# Patient Record
Sex: FEMALE | Race: BLACK OR AFRICAN AMERICAN | Employment: UNEMPLOYED | URBAN - METROPOLITAN AREA
[De-identification: names, ages, dates, MRNs, and addresses within clinical notes are randomized per-mention and may not be internally consistent; named-entity substitution may affect disease eponyms.]

---

## 2019-09-16 ENCOUNTER — OP HISTORICAL/CONVERTED ENCOUNTER (OUTPATIENT)
Dept: OTHER | Age: 39
End: 2019-09-16

## 2019-10-01 ENCOUNTER — OP HISTORICAL/CONVERTED ENCOUNTER (OUTPATIENT)
Dept: OTHER | Age: 39
End: 2019-10-01

## 2020-03-04 ENCOUNTER — OP HISTORICAL/CONVERTED ENCOUNTER (OUTPATIENT)
Dept: OTHER | Age: 40
End: 2020-03-04

## 2020-03-11 ENCOUNTER — OP HISTORICAL/CONVERTED ENCOUNTER (OUTPATIENT)
Dept: OTHER | Age: 40
End: 2020-03-11

## 2021-02-08 RX ORDER — HYDROCHLOROTHIAZIDE 25 MG/1
25 TABLET ORAL DAILY
COMMUNITY
End: 2022-02-10 | Stop reason: ALTCHOICE

## 2021-02-08 RX ORDER — MEDROXYPROGESTERONE ACETATE 10 MG/1
TABLET ORAL DAILY
COMMUNITY
End: 2022-02-10 | Stop reason: ALTCHOICE

## 2021-02-08 RX ORDER — FUROSEMIDE 40 MG/1
TABLET ORAL DAILY
COMMUNITY

## 2021-02-08 RX ORDER — ASPIRIN 325 MG
TABLET, DELAYED RELEASE (ENTERIC COATED) ORAL
COMMUNITY
End: 2022-02-10 | Stop reason: ALTCHOICE

## 2021-02-08 RX ORDER — OXYCODONE AND ACETAMINOPHEN 5; 325 MG/1; MG/1
TABLET ORAL
COMMUNITY
End: 2022-02-10 | Stop reason: ALTCHOICE

## 2021-02-08 RX ORDER — IBUPROFEN 800 MG/1
TABLET ORAL
COMMUNITY
End: 2022-02-10 | Stop reason: ALTCHOICE

## 2021-02-08 RX ORDER — DOXYCYCLINE 100 MG/1
100 CAPSULE ORAL 2 TIMES DAILY
COMMUNITY
End: 2022-02-10 | Stop reason: ALTCHOICE

## 2021-02-08 RX ORDER — AMLODIPINE BESYLATE 2.5 MG/1
TABLET ORAL DAILY
COMMUNITY
End: 2022-02-10 | Stop reason: ALTCHOICE

## 2021-02-08 RX ORDER — ALBUTEROL SULFATE 90 UG/1
AEROSOL, METERED RESPIRATORY (INHALATION)
COMMUNITY

## 2021-02-08 RX ORDER — PHENTERMINE HYDROCHLORIDE 37.5 MG/1
37.5 TABLET ORAL
COMMUNITY
End: 2022-02-10 | Stop reason: ALTCHOICE

## 2021-02-08 RX ORDER — FLUCONAZOLE 150 MG/1
150 TABLET ORAL DAILY
COMMUNITY
End: 2022-02-10 | Stop reason: ALTCHOICE

## 2022-02-01 ENCOUNTER — TELEPHONE (OUTPATIENT)
Dept: SURGERY | Age: 42
End: 2022-02-01

## 2022-02-01 NOTE — TELEPHONE ENCOUNTER
I spoke with patient regarding bariatric benefits. She is covered, ref # [id number]. I scheduled a consult with Dr. Fahad Hameed.  monico

## 2022-02-10 ENCOUNTER — OFFICE VISIT (OUTPATIENT)
Dept: SURGERY | Age: 42
End: 2022-02-10
Payer: COMMERCIAL

## 2022-02-10 VITALS
WEIGHT: 293 LBS | HEIGHT: 65 IN | RESPIRATION RATE: 20 BRPM | HEART RATE: 88 BPM | OXYGEN SATURATION: 98 % | TEMPERATURE: 97.6 F | BODY MASS INDEX: 48.82 KG/M2 | SYSTOLIC BLOOD PRESSURE: 143 MMHG | DIASTOLIC BLOOD PRESSURE: 96 MMHG

## 2022-02-10 DIAGNOSIS — E66.01 MORBID OBESITY WITH BODY MASS INDEX (BMI) OF 60.0 TO 69.9 IN ADULT (HCC): Primary | ICD-10-CM

## 2022-02-10 DIAGNOSIS — J45.20 MILD INTERMITTENT ASTHMA, UNSPECIFIED WHETHER COMPLICATED: ICD-10-CM

## 2022-02-10 DIAGNOSIS — E11.9 TYPE 2 DIABETES MELLITUS WITHOUT COMPLICATION, WITHOUT LONG-TERM CURRENT USE OF INSULIN (HCC): ICD-10-CM

## 2022-02-10 PROCEDURE — 99204 OFFICE O/P NEW MOD 45 MIN: CPT | Performed by: SURGERY

## 2022-02-10 RX ORDER — METFORMIN HYDROCHLORIDE 500 MG/1
500 TABLET ORAL 2 TIMES DAILY WITH MEALS
COMMUNITY

## 2022-02-10 RX ORDER — LIRAGLUTIDE 6 MG/ML
INJECTION SUBCUTANEOUS
COMMUNITY
Start: 2021-07-21

## 2022-02-10 NOTE — PROGRESS NOTES
Bariatric Surgery Consultation    CC: morbid obesity    Subjective: The patient is a 35 y.o. obese  female with a Body mass index is 63.24 kg/m². They have been considering surgery for some time now. The patient presents to the clinic today to discuss surgical weight loss options. They have made multiple attempts at weight loss over the years without success, including healthy diet, counting carbs, cutting out sodas. Unfortunately, none of these have lead to meaningful, sustained weight loss. The patient now suffers from multiple medical conditions related to their obesity including diabetes. Relevant previous surgical history includes:  section. They have attended the bariatric seminar prior to this office visit. The patient's goals for the surgery include wants to lose 100 lbs. The patient was in the surgical weight loss program [place] but she was diagnosed with uncontrolled diabetes with high HgbA1c prior to surgery so her procedure was delayed. She has not followed up with that program since that time. Tobacco use: no  GERD/hiatal hernia: patient denies, previous EGD at [facility] showed esophagitis, gastritis, and hiatal hernia    Sleep Apnea Assessment:     STOPBANG questionnaire  Do you Snore loudly? no  Do you often feel Tired, fatigued, or sleepy during the daytime? no  Has anyone Observed you stop breathing during your sleep? no  Are you being treated for high blood pressure? no  BMI more than 35 kg/m2? yes  Age over 48years old? no  Neck Circumference >16 inches? no  Gender male? no  ______________________________________     SCORE: 1     If YES to 0 - 2, low risk of sleep apnea  If YES to 3 - 4 intermediate risk of having sleep apnea  If YES to 5 - 8 high risk of having sleep apnea (or 2 + BMI 35 or Neck > 17\" or Male)     The patient was previously tested for sleep apnea during surgical weight loss evaluation and did not require CPAP.     Comorbidities: Bariatric comorbidities present: non-insulin dependent diabetes    Ambulatory status: independent    The patient's reported level of exercise: not active. Past Medical History:   Diagnosis Date    Asthma     Chronic pain     back pains/ disc compression    Diabetes ([address]     Hypertension      Past Surgical History:   Procedure Laterality Date    HX  SECTION  2011      Social History     Tobacco Use    Smoking status: Never Smoker    Smokeless tobacco: Never Used   Substance Use Topics    Alcohol use: Yes     Comment: socially      Family History   Problem Relation Age of Onset    Diabetes Mother     Heart Disease Mother     Hypertension Mother     Stroke Mother       Prior to Admission medications    Medication Sig Start Date End Date Taking? Authorizing Provider   liraglutide (Victoza 2-Jonn) 0.6 mg/0.1 mL (18 mg/3 mL) pnij See Instructions, start with 0.6 mg daily and increase gradually to1.8 mg in 2 weeks SQ daily, # 15 mL, 2 Refills, Pharmacy: [facility]/pharmacy #[id number]  Yes Provider, Historical   metFORMIN (GLUCOPHAGE) 500 mg tablet Take 500 mg by mouth two (2) times daily (with meals). Yes Provider, Historical   albuterol (PROVENTIL HFA, VENTOLIN HFA, PROAIR HFA) 90 mcg/actuation inhaler Take  by inhalation. Yes Provider, Historical   furosemide (LASIX) 40 mg tablet Take  by mouth daily.    Yes Provider, Historical     No Known Allergies     Review of Systems:    Constitutional: No fever or chills  Neurologic: No headache  Eyes: No scleral icterus or irritated eyes  Nose: No nasal pain or drainage  Mouth: No oral lesions or sore throat  Cardiac: No palpations or chest pain  Pulmonary: No cough or shortness of breath  Gastrointestinal: No nausea, emesis, diarrhea, or constipation  Genitourinary: No dysuria  Musculoskeletal: +back pain  Skin: No rashes or lesions  Psychiatric: No anxiety or depressed mood      Objective:     Physical Exam:    Visit Vitals  BP (!) 143/96 (BP 1 Location: Right upper arm, BP Patient Position: Sitting, BP Cuff Size: Large adult)   Pulse 88   Temp 97.6 °F (36.4 °C) (Temporal)   Resp 20   Ht 5' 5\" (1.651 m)   Wt (!) 380 lb (172.4 kg)   SpO2 98%   BMI 63.24 kg/m²       General: No acute distress, conversant  Eyes: PERRLA, no scleral icterus  HENT: Normocephalic without oral lesions  Neck: Trachea midline without LAD  Cardiac: Normal pulse rate and rhythm  Pulmonary: Symmetric chest rise with normal effort  GI: Soft, NT, ND, no hernia, no splenomegaly  Skin: Warm without rash  Extremities: No edema or joint stiffness  Psych: Appropriate mood and affect    Assessment:     Morbid obesity with associated comorbidities    Plan:   The patient presents today with multiple complications relating to their obesity as detailed above. The patient has made multiple unsuccessful attempts at weight loss as detailed above. The patient desires surgical weight loss for a better chance of lifelong weight control and control of co-morbidities. They have attended the bariatric seminar and meet the qualifications for surgery based on the NIH criteria. We have discussed the various surgical options including the sleeve gastrectomy and gastric bypass. We also discussed non operative alternatives. The patient is currently interested in the laparoscopic gastric bypass. I believe they are a good candidate for this procedure. They will need a/an upper endoscopy to evaluate their anatomy pre operatively. She previously had EGD at [facility], but had reflux and gastritis changes at that time so repeating EGD would be beneficial prior to surgery. The patient will be required to not gain weight during this period if starting BMI is 35-40, lose 5% of starting weight if BMI is >40, or lose 10% of starting weight if BMI >60 during the supervised weight loss / pre operative process before our next visit for pre-operative consents. The goal for this patient is to lose 38 lbs.     We recommend that the patient undergo the following evaluations prior to considering surgery:    Dietician: Yes  Psychiatry/Psychology: Yes  Sleep Medicine: no  Cardiology: no  Gastroenterology: no  Pulmonology: no  Endocrinology: Yes (Sees Dr. Conrad Morillo at [facility], has a history of uncontrolled DM with HgbA1c >10, does not take her metformin currently)    We have discussed the possible complications of bariatric surgery which include but are not limited to failed weight loss, weight regain, malnutrition, leak, bleed, stricture, gastric ulcer, gastric fistula, gastric bleed, gallstones, new or worsening gastric reflux, nausea, emesis, internal hernia, abdominal wall hernia, gastric perforation, need for revision / conversion / or reversal, pregnancy complications and loss, intestinal ischemia, post operative skin complications, possible thinning of their hair, bowel obstruction, dumping syndrome, wound infection, blood clots (DVT, mesenteric thrombus, pulmonary embolism), increased addictive tendency, risk of anesthesia, and death. The patient understands this is a life altering decision and will require compliance to the program for the remainder of their life in order to be monitored to avoid complication and ensure successful, sustained weight control. They will be placed on a lifelong low carbohydrate and low sugar diet, exercise, and vitamin regimen and will require frequent blood draws and office visits to ensure adequate nutrition and program compliance. Visits and follow up will be in compliance with the guidelines set forth by [facility]. I have specifically mentioned the need to avoid all personal and second-hand tobacco exposure, systemic steroids, and NSAIDS after any bariatric surgery to help avoid the above listed complications.  The patient has expressed understanding of the above and would like to enroll in the program. The patient will be submitted for medical and psychological clearance along with establishing with our dietician and joining the pre / post operative support group. They will be screened for depression and sleep apnea and treated pre operatively if needed. The patient will have to demonstrate cessation of tobacco if relevant for at least 3 months preoperatively along with having a controlled HbA1c less than 8. After successful completion of the preoperative regimen the patient will be submitted for insurance approval and pending this will be scheduled for surgery. All questions from the patient have been answered and they have demonstrated appropriate understanding of the process. Problem List Items Addressed This Visit        Endocrine    Diabetes [address] [address]    Relevant Medications    liraglutide (Victoza 2-Jonn) 0.6 mg/0.1 mL (18 mg/3 mL) pnij    metFORMIN (GLUCOPHAGE) 500 mg tablet       Respiratory    Asthma      Other Visit Diagnoses     Morbid obesity with body mass index (BMI) of 60.0 to 69.9 in adult [facility])    -  Primary    Relevant Medications    liraglutide (Victoza 2-Jonn) 0.6 mg/0.1 mL (18 mg/3 mL) pnij    metFORMIN (GLUCOPHAGE) 500 mg tablet            Total time involved with this patient's care was: 45 minutes. This involved reviewing patient record, talking with patient, and charting on patient.     Signed By: Shaquille Martinez DO  Bariatric and General Surgeon  [address] [place] [address] Surgery    February 10, 2022

## 2022-02-10 NOTE — PROGRESS NOTES
Visit Vitals  BP (!) 143/96 (BP 1 Location: Right upper arm, BP Patient Position: Sitting, BP Cuff Size: Large adult)   Pulse 88   Temp 97.6 °F (36.4 °C) (Temporal)   Resp 20   Ht 5' 5\" (1.651 m)   Wt (!) 380 lb (172.4 kg)   SpO2 98%   BMI 63.24 kg/m²     Chief Complaint   Patient presents with    New Patient     Evaluate for bariatric surgery   1. Have you been to the ER, urgent care clinic since your last visit? Hospitalized since your last visit? No    2. Have you seen or consulted any other health care providers outside of the [address] [place] [address] since your last visit? Include any pap smears or colon screening. No blood pressure elevated-recheck after Dr. Augustine Hebert pt. Manual blood pressure check left lower arm 130/80.

## 2022-02-10 NOTE — Clinical Note
2/11/2022    Patient: Hieu Amaral   YOB: 1980   Date of Visit: 2/10/2022     Darreld Gosselin, MD  [address]  [address] [facility] [place] [address]  Via Fax: [phone]    Dear Darreld Gosselin, MD,      Thank you for referring Ms. Hieu Amaral to [address] [place] [address] for evaluation. My notes for this consultation are attached. If you have questions, please do not hesitate to call me. I look forward to following your patient along with you.       Sincerely,    Marcy Jensen Michelle, DO

## 2022-02-11 NOTE — PATIENT INSTRUCTIONS
Learning About Weight-Loss (Bariatric) Surgery  What is weight-loss surgery? Bariatric surgery is surgery to help you lose weight. This type of surgery is only used for people who are very overweight and have not been able to lose weight with diet and exercise. This surgery makes the stomach smaller. Some types of surgery also change the connection between your stomach and intestines. Having weight-loss surgery is a big step. After surgery, you'll need to make new, lifelong changes in how you eat and drink. How is weight-loss surgery done? Bariatric surgery may be either \"open\" or \"laparoscopic. \" Open surgery is done through a large cut (incision) in the belly. Laparoscopic surgery is done through several small cuts. The doctor puts a lighted tube, or scope, and other surgical tools through small cuts in your belly. The doctor is able to see your organs with the scope. There are different types of bariatric surgery. Gastric sleeve surgery  The surgery is usually done through several small incisions in the belly. The doctor removes more than half of your stomach. This leaves a thin sleeve, or tube, that is about the size of a banana. Because part of your stomach has been removed, this can't be reversed. Leslie-en-Y gastric bypass surgery  Leslie-en-Y (say \"christiano-en-why\") surgery changes the connection between the stomach and the intestines. The doctor separates a section of your stomach from the rest of your stomach. This makes a small pouch. The new pouch will hold the food you eat. The doctor connects the stomach pouch to the middle part of the small intestine. Gastric banding surgery  The surgery is usually done through several small incisions in the belly. The doctor wraps a band around the upper part of the stomach. This creates a small pouch. The small size of the pouch means that you will get full after you eat just a small amount of food.  The doctor can inflate or deflate the band to adjust the size. This lets the doctor adjust how quickly food passes from the new pouch into the stomach. It does not change the connection between the stomach and the intestines. What can you expect after the surgery? You may stay in the hospital for one or more days after the surgery. How long you stay depends on the type of surgery you had. Most people need 2 to 4 weeks before they are ready to get back to their usual routine. Your doctor will give you specific instructions about what to eat after the surgery. You'll start with only small amounts of soft foods and liquids. Bit by bit, you will be able to eat more solid foods. Your doctor may advise you to work with a dietitian. This way you'll be sure to get enough protein, vitamins, and minerals while you are losing weight. Even with a healthy diet, you may need to take vitamin and mineral supplements. After surgery, you will not be able to eat very much at one time. You will get full quickly. Try not to eat too much at one time or eat foods that are high in fat or sugar. If you do, you may vomit, get stomach pain, or have diarrhea. Weight loss  You probably will lose weight very quickly in the first few months after surgery. As time goes on, your weight loss will slow down. You will have regular doctor visits to check how you are doing. Emotions  It is common to have many emotions after this surgery. You may feel happy or excited as you begin to lose weight. But you may also feel overwhelmed or frustrated by the changes that you have to make in your diet, activity, and lifestyle. Talk with your doctor if you have concerns or questions. Think of bariatric surgery as a tool to help you lose weight. It isn't an instant fix. You will still need to eat a healthy diet and get regular exercise. This will help you reach your weight goal and avoid regaining the weight you lose. Follow-up care is a key part of your treatment and safety.  Be sure to make and go to all appointments, and call your doctor if you are having problems. It's also a good idea to know your test results and keep a list of the medicines you take. Where can you learn more? Go to http://www.gray.com/  Enter G469 in the search box to learn more about \"Learning About Weight-Loss (Bariatric) Surgery. \"  Current as of: March 17, 2021               Content Version: 13.0  © 6440-0982 [employer], [employer]. Care instructions adapted under license by [employer] (which disclaims liability or warranty for this information). If you have questions about a medical condition or this instruction, always ask your healthcare professional. [address] any warranty or liability for your use of this information.

## 2022-02-17 ENCOUNTER — TELEPHONE (OUTPATIENT)
Dept: SURGERY | Age: 42
End: 2022-02-17

## 2022-02-17 NOTE — TELEPHONE ENCOUNTER
Patient called stated would like to set up endoscopy with Dr Martinez.  Please call patient [phone]

## 2022-03-22 ENCOUNTER — TELEPHONE (OUTPATIENT)
Dept: SURGERY | Age: 42
End: 2022-03-22

## 2022-03-23 NOTE — TELEPHONE ENCOUNTER
Returned call to pt. We scheduled pt for her endoscopy on 04/20/22. 1030am. Pt understands not to eat or drink after midnight the night before. Pt understands she odette get a call from pre-admission testing a few days prior to her procedure. Thank you.

## 2022-03-30 ENCOUNTER — CLINICAL SUPPORT (OUTPATIENT)
Dept: SURGERY | Age: 42
End: 2022-03-30

## 2022-03-30 DIAGNOSIS — E66.01 MORBID OBESITY WITH BODY MASS INDEX (BMI) OF 60.0 TO 69.9 IN ADULT (HCC): Primary | ICD-10-CM

## 2022-03-30 NOTE — PROGRESS NOTES
Pre-operative Bariatric Nutrition Evaluation ([employer] 1 of 6)     Date: 3/30/2022   Physician/Surgeon:Angeles Martinez D.O. Name: Sandy Garcia  :  1980  Age:  35  Gender: Female   Type of Surgery: [x]           Gastric Bypass   []           Sleeve Gastrectomy    ASSESSMENT:    Past Medical History:Type II DM     Medications/Supplements:   Prior to Admission medications    Medication Sig Start Date End Date Taking? Authorizing Provider   liraglutide (Victoza 2-Jonn) 0.6 mg/0.1 mL (18 mg/3 mL) pnij See Instructions, start with 0.6 mg daily and increase gradually to1.8 mg in 2 weeks SQ daily, # 15 mL, 2 Refills, Pharmacy: [facility]/pharmacy #[id number]   Provider, Historical   metFORMIN (GLUCOPHAGE) 500 mg tablet Take 500 mg by mouth two (2) times daily (with meals). Provider, Historical   albuterol (PROVENTIL HFA, VENTOLIN HFA, PROAIR HFA) 90 mcg/actuation inhaler Take  by inhalation. Provider, Historical   furosemide (LASIX) 40 mg tablet Take  by mouth daily. Provider, Historical       Food Allergies/Intolerances:none    Anthropometrics:    Ht:65\"   Reported Wt: 371#     Recent Office Wt: 380#    IBW: 125#    %IBW: 304%     BMI:63    Category: obesity III     Reported wt history: Pt completing pre-op nutrition evaluation for wt loss surgery over the phone. Reports highest adult BW of 380#. Attributes wt gain over the years r/t fluid retention, arthritis in her back that limits exercise. Has attempted wt loss through various methods with most successful wt loss of 10-15#. Pt previously went through approval process for bariatric surgery at [facility] in  and was unable to move forward d/t elevated HgbA1c (10.3% on 2021). Is unsure of current HgbA1c. Has been unable to maintain long term or significant wt loss and is now seeking approval for weight loss surgery. Pt will need to complete 6 months of supervised weight loss for insurance requirements with a 38# wt loss recommendation during that time. Exercise/Physical Activity:limited d/t back pain and arthritis in knee; has started walking and taking her stairs more frequent at home    Reported Diet History:mostly self-directed diets; reduced intake of fried foods, bread and sugar sweetened beverages    24 Hour Diet Recall  Breakfast  Skips or sometimes uses a protein shake   Lunch  Fruit and salad (pre-made or made at home) with crab meat   Dinner  Baked spaghetti and tossed salad; air fried or broiled chicken    Snacks     Beverages  Lemonade, water, Propel water; stopped drinking soda       Environment/Psychosocial/Support:Pt reports majority of her support is through her best friend who has had weight loss surgery and has done well. Pt reports support from her children (ages 25 and 8). Pt does all of the grocery shopping and cooking for household. Pt is currently not working, has been taking a leave of absence for the past few months while caring for her mother who passed away in December 2021. NUTRITION DIAGNOSIS:  1. Self-monitoring deficit r/t previous lack of value for this change evidenced by pt skips meals. 2. Excessive energy intake r/t food and beverage choices evidenced by diet recall. Pt has recently reduced intake of sugar sweetened beverages and fried foods resulting in reported 9# wt loss. NUTRITION INTERVENTION:  Pt educated on nutrition recommendations for weight loss surgery, specifically gastric bypass. Instructed on consuming 3 meals per day starting now. Use the balanced plate method to plan meals, include 3 oz of lean source of protein, 1/2 cup whole grains, unlimited non-starchy vegetables, 1/2 cup fruit and 1 serving of low fat dairy. Utilize handouts listing healthy snack and meal ideas to limit restaurant meals. After surgery measure all meals to 1/2 cup. Each meal will contain a 1/4 cup lean protein and 1/4 cup fruit, non-starchy vegetable or starch (limiting to once per day). Aim for 60 g protein per day. Sip on 48-64 oz of sugar free, calorie free, non-carbonated beverages each day. Do not use a straw. Do not consume beverages 30 minutes before, during or 30 minutes after meals. Read all nutrition labels. Demonstrated and emphasized identifying serving size, total fat, sugar and protein content. Defined low fat as </= 3 g per serving. Discussed lean and extra lean sources of protein. Provided list of low fat cooking methods. Avoid foods with sugar listed in the first 3 ingredients and >/15 g sugar per serving. Excess sugar/fat intake may lead to dumping syndrome. Discussed signs and symptoms of dumping syndrome. Practice mindful eating habits; take small bites, chew thoroughly, avoid distractions, utilize hunger/fullness scale. Consume meals over 20-30 minutes. Attend Bariatric Support Group and increase physical activity (approved per MD) for long term weight maintenance. NUTRITION MONITORING AND EVALUATION:    The following goals were established with patient;  1. Eat 3 meals a day, everyday. Do not skip meals as this can lead to overeating, inadequate protein intake and poor blood sugar management. Can use protein shakes PRN. List of recommended shakes provided. 2. Continue to decrease and eventually eliminate sugar sweetened beverages. Replace with water and other sugar-free alternatives. 3. Incorporating protein at each meal or snack. 4. Review nutrition education materials. Follow up next month for continued nutrition education and supervised weight loss. Pt agrees to complete a combination of phone and virtual class appointments. Specific tips and techniques to facilitate compliance with above recommendations were provided and discussed. Nutrition evaluation reveals important lifestyle changes are indicated. Goals set and recommendations made. Pt is actively making lifestyle changes. Will continue to assess.   If further details are desired please feel free to contact me at [phone]. This phone number was also provided to the patient for any further questions or concerns.            Tadeo Mosley RD

## 2022-04-13 NOTE — ROUTINE PROCESS
PAT complete with patient. Confirmed that it was OK to arrive between 0900 and 0920 due to dropping children off. Time, date, type/location of procedure & arrival time confirmed. Patient aware to remain NPO after midnight, and  required post procedure. No Order sheet or H&P provided by MD office.   MD to put orders and H&P in Epic (Per MD office)

## 2022-04-18 ENCOUNTER — TRANSCRIBE ORDER (OUTPATIENT)
Dept: SCHEDULING | Age: 42
End: 2022-04-18

## 2022-04-18 DIAGNOSIS — Z12.31 SCREENING MAMMOGRAM FOR HIGH-RISK PATIENT: Primary | ICD-10-CM

## 2022-04-20 ENCOUNTER — ANESTHESIA EVENT (OUTPATIENT)
Dept: ENDOSCOPY | Age: 42
End: 2022-04-20
Payer: COMMERCIAL

## 2022-04-20 ENCOUNTER — HOSPITAL ENCOUNTER (OUTPATIENT)
Age: 42
Setting detail: OUTPATIENT SURGERY
Discharge: HOME | End: 2022-04-20
Attending: SURGERY | Admitting: SURGERY
Payer: COMMERCIAL

## 2022-04-20 ENCOUNTER — ANESTHESIA (OUTPATIENT)
Dept: ENDOSCOPY | Age: 42
End: 2022-04-20
Payer: COMMERCIAL

## 2022-04-20 ENCOUNTER — APPOINTMENT (OUTPATIENT)
Dept: ENDOSCOPY | Age: 42
End: 2022-04-20
Attending: SURGERY
Payer: COMMERCIAL

## 2022-04-20 VITALS
TEMPERATURE: 97.7 F | BODY MASS INDEX: 48.82 KG/M2 | HEART RATE: 84 BPM | OXYGEN SATURATION: 97 % | SYSTOLIC BLOOD PRESSURE: 130 MMHG | HEIGHT: 65 IN | RESPIRATION RATE: 18 BRPM | WEIGHT: 293 LBS | DIASTOLIC BLOOD PRESSURE: 82 MMHG

## 2022-04-20 LAB
GLUCOSE BLD STRIP.AUTO-MCNC: 276 MG/DL (ref 65–117)
PERFORMED BY, TECHID: ABNORMAL

## 2022-04-20 PROCEDURE — 74011250636 HC RX REV CODE- 250/636: Performed by: SURGERY

## 2022-04-20 PROCEDURE — 77030021593 HC FCPS BIOP ENDOSC BSC -A: Performed by: SURGERY

## 2022-04-20 PROCEDURE — 82962 GLUCOSE BLOOD TEST: CPT

## 2022-04-20 PROCEDURE — 74011000250 HC RX REV CODE- 250: Performed by: NURSE ANESTHETIST, CERTIFIED REGISTERED

## 2022-04-20 PROCEDURE — 74011250636 HC RX REV CODE- 250/636: Performed by: NURSE ANESTHETIST, CERTIFIED REGISTERED

## 2022-04-20 PROCEDURE — 76040000007: Performed by: SURGERY

## 2022-04-20 PROCEDURE — 88312 SPECIAL STAINS GROUP 1: CPT

## 2022-04-20 PROCEDURE — 77030019988 HC FCPS ENDOSC DISP BSC -B: Performed by: SURGERY

## 2022-04-20 PROCEDURE — 88305 TISSUE EXAM BY PATHOLOGIST: CPT

## 2022-04-20 PROCEDURE — 76060000032 HC ANESTHESIA 0.5 TO 1 HR: Performed by: SURGERY

## 2022-04-20 RX ORDER — LIDOCAINE HYDROCHLORIDE 20 MG/ML
INJECTION, SOLUTION EPIDURAL; INFILTRATION; INTRACAUDAL; PERINEURAL
Status: COMPLETED
Start: 2022-04-20 | End: 2022-04-20

## 2022-04-20 RX ORDER — FENTANYL CITRATE 50 UG/ML
INJECTION, SOLUTION INTRAMUSCULAR; INTRAVENOUS AS NEEDED
Status: DISCONTINUED | OUTPATIENT
Start: 2022-04-20 | End: 2022-04-20 | Stop reason: HOSPADM

## 2022-04-20 RX ORDER — GLYCOPYRROLATE 0.2 MG/ML
INJECTION INTRAMUSCULAR; INTRAVENOUS AS NEEDED
Status: DISCONTINUED | OUTPATIENT
Start: 2022-04-20 | End: 2022-04-20 | Stop reason: HOSPADM

## 2022-04-20 RX ORDER — FENTANYL CITRATE 50 UG/ML
INJECTION, SOLUTION INTRAMUSCULAR; INTRAVENOUS
Status: COMPLETED
Start: 2022-04-20 | End: 2022-04-20

## 2022-04-20 RX ORDER — PROPOFOL 10 MG/ML
INJECTION, EMULSION INTRAVENOUS
Status: COMPLETED
Start: 2022-04-20 | End: 2022-04-20

## 2022-04-20 RX ORDER — SODIUM CHLORIDE, SODIUM LACTATE, POTASSIUM CHLORIDE, CALCIUM CHLORIDE 600; 310; 30; 20 MG/100ML; MG/100ML; MG/100ML; MG/100ML
INJECTION, SOLUTION INTRAVENOUS
Status: DISCONTINUED | OUTPATIENT
Start: 2022-04-20 | End: 2022-04-20 | Stop reason: HOSPADM

## 2022-04-20 RX ORDER — ERGOCALCIFEROL 1.25 MG/1
50000 CAPSULE ORAL
COMMUNITY

## 2022-04-20 RX ORDER — PROPOFOL 10 MG/ML
INJECTION, EMULSION INTRAVENOUS AS NEEDED
Status: DISCONTINUED | OUTPATIENT
Start: 2022-04-20 | End: 2022-04-20 | Stop reason: HOSPADM

## 2022-04-20 RX ORDER — LIDOCAINE HYDROCHLORIDE 20 MG/ML
INJECTION, SOLUTION EPIDURAL; INFILTRATION; INTRACAUDAL; PERINEURAL AS NEEDED
Status: DISCONTINUED | OUTPATIENT
Start: 2022-04-20 | End: 2022-04-20 | Stop reason: HOSPADM

## 2022-04-20 RX ORDER — SODIUM CHLORIDE, SODIUM LACTATE, POTASSIUM CHLORIDE, CALCIUM CHLORIDE 600; 310; 30; 20 MG/100ML; MG/100ML; MG/100ML; MG/100ML
75 INJECTION, SOLUTION INTRAVENOUS CONTINUOUS
Status: DISCONTINUED | OUTPATIENT
Start: 2022-04-20 | End: 2022-04-20 | Stop reason: HOSPADM

## 2022-04-20 RX ADMIN — PROPOFOL 150 MG: 10 INJECTION, EMULSION INTRAVENOUS at 10:52

## 2022-04-20 RX ADMIN — PROPOFOL 10 MG: 10 INJECTION, EMULSION INTRAVENOUS at 10:59

## 2022-04-20 RX ADMIN — FENTANYL CITRATE 50 MCG: 50 INJECTION, SOLUTION INTRAMUSCULAR; INTRAVENOUS at 10:49

## 2022-04-20 RX ADMIN — GLYCOPYRROLATE 0.1 MG: 0.2 INJECTION, SOLUTION INTRAMUSCULAR; INTRAVENOUS at 10:48

## 2022-04-20 RX ADMIN — PROPOFOL 50 MG: 10 INJECTION, EMULSION INTRAVENOUS at 10:56

## 2022-04-20 RX ADMIN — SODIUM CHLORIDE, POTASSIUM CHLORIDE, SODIUM LACTATE AND CALCIUM CHLORIDE 75 ML/HR: 600; 310; 30; 20 INJECTION, SOLUTION INTRAVENOUS at 10:30

## 2022-04-20 RX ADMIN — SODIUM CHLORIDE, POTASSIUM CHLORIDE, SODIUM LACTATE AND CALCIUM CHLORIDE: 600; 310; 30; 20 INJECTION, SOLUTION INTRAVENOUS at 10:48

## 2022-04-20 RX ADMIN — LIDOCAINE HYDROCHLORIDE 100 MG: 20 INJECTION, SOLUTION EPIDURAL; INFILTRATION; INTRACAUDAL; PERINEURAL at 10:52

## 2022-04-20 NOTE — ANESTHESIA POSTPROCEDURE EVALUATION
Procedure(s):  ESOPHAGOGASTRODUODENOSCOPY (EGD)  ESOPHAGOGASTRODUODENAL (EGD) BIOPSY. total IV anesthesia, MAC    Anesthesia Post Evaluation      Multimodal analgesia: multimodal analgesia not used between 6 hours prior to anesthesia start to PACU discharge  Patient location during evaluation: bedside  Patient participation: complete - patient participated  Level of consciousness: lethargic  Airway patency: patent  Anesthetic complications: no  Cardiovascular status: acceptable  Respiratory status: acceptable  Hydration status: acceptable  Post anesthesia nausea and vomiting:  none  Final Post Anesthesia Temperature Assessment:  Normothermia (36.0-37.5 degrees C)      INITIAL Post-op Vital signs: No vitals data found for the desired time range.

## 2022-04-20 NOTE — ANESTHESIA PREPROCEDURE EVALUATION
Relevant Problems   No relevant active problems       Anesthetic History   No history of anesthetic complications            Review of Systems / Medical History  Patient summary reviewed, nursing notes reviewed and pertinent labs reviewed    Pulmonary            Asthma        Neuro/Psych   Within defined limits           Cardiovascular    Hypertension              Exercise tolerance: <4 METS     GI/Hepatic/Renal  Within defined limits              Endo/Other    Diabetes    Morbid obesity and arthritis     Other Findings            Past Medical History:   Diagnosis Date    Arthritis     back    Asthma     Chronic pain     back pains/ disc compression    Diabetes ([address]     Hypertension     Morbid obesity ([facility] [address]        Past Surgical History:   Procedure Laterality Date    HX  SECTION      IR ABLATION VEIN EXT INITIAL      x2 per pt       Current Outpatient Medications   Medication Instructions    albuterol (PROVENTIL HFA, VENTOLIN HFA, PROAIR HFA) 90 mcg/actuation inhaler Inhalation    ergocalciferol (VITAMIN D2) 50,000 Units, Oral, Once a week.      furosemide (LASIX) 40 mg tablet Oral, DAILY    liraglutide (Victoza 2-Jonn) 0.6 mg/0.1 mL (18 mg/3 mL) pnij See Instructions, start with 0.6 mg daily and increase gradually to1.8 mg in 2 weeks SQ daily, # 15 mL, 2 Refills, Pharmacy: [facility]/pharmacy #[id number]    metFORMIN (GLUCOPHAGE) 500 mg, Oral, 2 TIMES DAILY WITH MEALS       Current Facility-Administered Medications   Medication Dose Route Frequency    lactated Ringers infusion  75 mL/hr IntraVENous CONTINUOUS       Patient Vitals for the past 24 hrs:   Temp Pulse Resp BP SpO2   22 1022 36.3 °C (97.4 °F) 88 18 (!) 147/91 96 %       No results found for: WBC, WBCLT, HGBPOC, HGB, HGBP, HCTPOC, HCT, PHCT, RBCH, PLT, MCV, HGBEXT, HCTEXT, PLTEXT  No results found for: NA, K, CL, CO2, AGAP, GLU, BUN, CREA, BUCR, GFRAA, GFRNA, CA, GFRAA  No results found for: APTT, PTP, INR, INREXT  Lab Results Component Value Date/Time    Glucose (POC) 276 (H) 04/20/2022 10:25 AM     Physical Exam    Airway  Mallampati: II  TM Distance: 4 - 6 cm  Neck ROM: normal range of motion   Mouth opening: Normal     Cardiovascular    Rhythm: regular  Rate: normal         Dental  No notable dental hx       Pulmonary  Breath sounds clear to auscultation               Abdominal  GI exam deferred       Other Findings            Anesthetic Plan    ASA: 3  Anesthesia type: total IV anesthesia and MAC          Induction: Intravenous  Anesthetic plan and risks discussed with: Patient and Family

## 2022-04-20 NOTE — H&P
Endoscopy History and Physical    Subjective:      Yann Jameson is a 35 y.o. female who presents for EGD with biopsy for evaluation prior to bariatric surgery. She is feeling well and denies any complaints today. Past Medical History:   Diagnosis Date    Arthritis     back    Asthma     Chronic pain     back pains/ disc compression    Diabetes ([address]     Hypertension      Past Surgical History:   Procedure Laterality Date    HX  SECTION  2011      Family History   Problem Relation Age of Onset    Diabetes Mother     Heart Disease Mother     Hypertension Mother     Stroke Mother      Social History     Tobacco Use    Smoking status: Never Smoker    Smokeless tobacco: Never Used   Substance Use Topics    Alcohol use: Yes     Comment: socially      Prior to Admission medications    Medication Sig Start Date End Date Taking? Authorizing Provider   liraglutide (Victoza 2-Jonn) 0.6 mg/0.1 mL (18 mg/3 mL) pnij See Instructions, start with 0.6 mg daily and increase gradually to1.8 mg in 2 weeks SQ daily, # 15 mL, 2 Refills, Pharmacy: [facility]/pharmacy #[id number]   Provider, Historical   metFORMIN (GLUCOPHAGE) 500 mg tablet Take 500 mg by mouth two (2) times daily (with meals). Provider, Historical   albuterol (PROVENTIL HFA, VENTOLIN HFA, PROAIR HFA) 90 mcg/actuation inhaler Take  by inhalation. Provider, Historical   furosemide (LASIX) 40 mg tablet Take  by mouth daily. Provider, Historical      No Known Allergies    Review of Systems:  Review of Systems   Constitutional: Negative for chills, fever and weight loss. HENT: Negative for congestion, ear pain and sore throat. Eyes: Negative for blurred vision and redness. Respiratory: Negative for cough, shortness of breath and wheezing. Cardiovascular: Negative for chest pain, palpitations and leg swelling. Gastrointestinal: Negative for abdominal pain, nausea and vomiting.    Genitourinary: Negative for dysuria, frequency and hematuria. Musculoskeletal: Negative for joint pain and myalgias. Skin: Negative for itching and rash. Neurological: Negative for dizziness, seizures and headaches. Endo/Heme/Allergies: Does not bruise/bleed easily. Objective:      No data found. No data recorded. Physical Exam:  General: alert, oriented, in no acute distress  Neck: supple, no masses, no JVD  Cardiovascular: regular rate and rhythm  Pulmonary: unlabored breathing, equal chest rise bilaterally, no wheezing or rhonchi  Abdomen: soft, non tender, non distended  Extremities: no swelling, pulses equal and palpable in all extremities      Assessment:     GERD  Screening EGD with biopsy prior to bariatric surgery    Plan:     Discussed the risk of esophagogastroduodenoscopy with biopsy including bleeding, perforation, and the risks of sedation anesthetic. The patient understands the risks; any and all questions were answered to the patient's satisfaction.

## 2022-04-27 ENCOUNTER — CLINICAL SUPPORT (OUTPATIENT)
Dept: SURGERY | Age: 42
End: 2022-04-27

## 2022-04-27 DIAGNOSIS — E66.01 MORBID OBESITY WITH BODY MASS INDEX (BMI) OF 60.0 TO 69.9 IN ADULT (HCC): Primary | ICD-10-CM

## 2022-04-27 NOTE — PROGRESS NOTES
[facility] at [facility]  Supervised Weight Loss     Date:   2022    Patient's Name: Joanna Aguirre  : 1980    Insurance:  [employer]            Session: 2 of  6  Surgery: Gastric Bypass  Surgeon:  Robert Martinez D.O. Height: 65\"  Reported Weight:    369      Lbs. BMI: 61   Pounds Lost since last month: 2#               Pounds Gained since last month: 0    Starting Weight: 371#   Previous Months Weight: 371#  Overall Pounds Lost: 2#  Overall Pounds Gained: 0    Other Pertinent Information: Today's appointment was completed over the phone. Pt has been recommended to lose 38# while in the program. Reports recent HgbA1c was 12%. Smoking Status:  none this past month   Alcohol Intake: none this past month    I have reviewed with pt the guidelines of the supervised wt loss program.  Pt understands the expectations of some wt loss during the program and that wt gain could delay the process. I have also explained that appointments need to be consecutive and missing an appointment may result in starting over. Pt has received this information in writing. Changes that patient has made since last month include:  Stopped eating after 8:00 pm, limiting intake of bread. Taking diabetes medication more consistently. No juice. Eating Habits and Behaviors  General healthy eating guidelines were also discussed. Pts were instructed that their plate should be made up 1/2 plate coming from non-starchy vegetables, 1/4 coming from lean meat, and 1/4 of their plate coming from carbohydrates, including fruits, starches, or milk. We discussed measuring meals to 1/2 cup total per meal after surgery. Drinking only calorie-free, sugar-free and non-carbonated beverages. We discussed the importance of drinking 64 ounces of fluid per day to prevent dehydration post-operatively. Patient's current diet habits include: Pt is eating 2-3 meals per day.  Drinking Boost Glucose Control or Slim Fast for breakfast. Lunch and dinner baked protein and salads. Snack choices include minimal to none. Pt is eating refined carbohydrate foods (bread, pasta, rice, potatoes) in moderation, eliminated bread. Pt is eating sweets/desserts none. Pt is using more baked cooking methods. Pt is eating meals prepared outside of the home none this past month. Pt is drinking mostly water and Crystal Light. Eliminated juice. Pt reports sometimes emotional eating. Physical Activity/Exercise  An exercise presentation was provided including information about exercise programs available both before and after surgery. We talked about the importance of increasing daily physical activity and beginning to develop an exercise regimen/routine. We talked about exercise as being an important part of long term weight loss after surgery. Comments:  During class, I discussed with patient the importance of getting into an exercise routine. Pt is currently walking for 30 minutes outside 3 times per week for activity. Pt has been encouraged to maintain and increase as tolerated. Behavior Modification       We talked about how to eat more mindfully. Tips and recommendations for how to make these changes were provided. Pt was encouraged to keep a food journal and record what they were taking in daily. Overall Assessment: Pt demonstrates small appropriate lifestyle changes evidenced by reported changes and reported wt loss. Will continue to assess. Patient-Set Goals:   1. Nutrition - 3 meals a day and use protein shakes PRN   2. Exercise - maintain and increase as tolerated, add 5 minutes    3.  Behavior -healthy snack choices GALI Álvarez, SANG  4/27/2022

## 2022-05-12 RX ORDER — MEDROXYPROGESTERONE ACETATE 10 MG/1
TABLET ORAL
Qty: 10 TABLET | Refills: 5 | Status: SHIPPED | OUTPATIENT
Start: 2022-05-12

## 2022-05-23 ENCOUNTER — CLINICAL SUPPORT (OUTPATIENT)
Dept: SURGERY | Age: 42
End: 2022-05-23

## 2022-05-23 DIAGNOSIS — E66.01 MORBID OBESITY WITH BODY MASS INDEX (BMI) OF 60.0 TO 69.9 IN ADULT (HCC): Primary | ICD-10-CM

## 2022-05-23 NOTE — PROGRESS NOTES
[employer] [facility] at [facility]  Supervised Weight Loss     Date:   2022    Patient's Name: Tamar Holder  : 1980    Insurance:  [employer]                              Session: 3 of  6  Surgery: Gastric Bypass                  Surgeon:  RINKU Gaytan.      Height: 65\"                 Reported Weight:    361      Lbs. BMI: 60             Pounds Lost since last month: 8#               Pounds Gained since last month: 0     Starting Weight: 380#                         Previous Months Weight: 369#  Overall Pounds Lost: 19#                  Overall Pounds Gained: 0     Other Pertinent Information: Today's appointment was completed over the phone. Pt has been recommended to lose 38# while in the program. Reports recent HgbA1c was 10% (was previously 12%). Smoking Status:  none  Alcohol Intake: none    I have reviewed with pt the guidelines of the supervised wt loss program.  Pt understands the expectations of some wt loss during the program and that wt gain could delay the process. I have also explained that appointments need to be consecutive and missing an appointment may result in starting over. Pt has received this information in writing. Changes that patient has made since last month include: Increased walking, drinking more water and eliminated sweetened tea. Not eating after 8:00 pm (reduced snacking)      Eating Habits and Behaviors  A nutrition lesson was presented on label reading with specific guidelines provided for limiting added sugars. This information will help increase healthy food choices, promote weight loss and prevent dumping syndrome after gastric bypass. We also reviewed the general nutrition guidelines for bariatric surgery. Patient's current diet habits include: Pt is eating 3 meals per day. Breakfast is a protein shake (Glucerna Hunger Smart) in place of skipping.  If eating breakfast is boiled eggs and peanut butter. Snack choices include none. Eating more salads and protein (salmon, steak). Pt is eating refined carbohydrate foods (bread, pasta, rice, potatoes) in moderation. Pt is eating sweets/desserts none. Pt is using mostly healthy cooking methods. Pt is eating meals prepared outside of the home none this past month. Is anticipating traveling this week. Pt is drinking mostly water and using Crystal Light PRN. Pt reports no to emotional eating. Physical Activity/Exercise  We talked about the importance of increasing daily physical activity and beginning to develop an exercise regimen/routine. We talked about exercise as being an important part of long term weight loss after surgery. Comments:  During class, I discussed with patient the importance of getting into an exercise routine. Pt is currently walking 2 miles, 3 times per week for activity. Pt has been encouraged to maintain and increase as tolerated. Behavior Modification       We talked about how to eat more mindfully. Tips and recommendations for how to make these changes were provided. Pt was encouraged to keep a food journal and record what they were taking in daily. Overall Assessment: Pt demonstrates appropriate lifestyle changes evidenced by reported changes, reported wt loss and improved A1c (still working towards goal of 8% or less for surgery approval). Will continue to assess. Patient-Set Goals:   1. Nutrition - maintain current eating habits as described   2. Exercise - maintain walking as tolerated   3.  Behavior -plan ahead for healthy choice with upcoming travel (grilled instead of fried, salad or fruit cup instead of french fries)    Gene Francisco, RD  5/23/2022

## 2022-06-17 ENCOUNTER — CLINICAL SUPPORT (OUTPATIENT)
Dept: SURGERY | Age: 42
End: 2022-06-17

## 2022-06-17 DIAGNOSIS — E66.01 MORBID OBESITY WITH BODY MASS INDEX (BMI) OF 60.0 TO 69.9 IN ADULT (HCC): Primary | ICD-10-CM

## 2022-06-17 NOTE — PROGRESS NOTES
[facility] at [facility]  Supervised Weight Loss     Date:   2022    Patient's Name: Sha Willett  : 1980    Insurance:  [employer]                              Session: 4 of  6  Surgery: Gastric Bypass                  Surgeon: RINKU Bess.      Height: 65\"                 Reported FX:    426      TSZ.                               BMI: 70             Pounds Lost since last month: 4#               Pounds Gained since last month: 0     Starting Weight: 380#                         Previous Months Weight: 361#  Overall Pounds Lost: 23#                  Overall Pounds Gained: 0     Other Pertinent Information: Today's appointment was completed over the phone. Pt has been recommended to lose 38# while in the program. Reports recent HgbA1c was 10% (was previously 12%). Smoking Status:  none  Alcohol Intake: none    I have reviewed with pt the guidelines of the supervised wt loss program.  Pt understands the expectations of some wt loss during the program and that wt gain could delay the process. I have also explained that classes need to be consecutive. Missing a class may result in starting over. Pt has received this information in writing. Changes that patient has made since last month include:  Improved blood sugar management. Not eating after 8:00 pm. No fried foods. Limited eating out. Eating Habits and Behaviors  General healthy eating guidelines were discussed. A nutrition lesson specific to the importance of protein intake after surgery was provided. We discussed food sources of protein, protein supplements and multiple reasons as to why protein is important after bariatric surgery. Pts were instructed to focus on including protein at every meal and practice eating protein first at the meal. Pts were encouraged to sample a protein shake for tolerance.  Patients were also instructed to use the balanced plate method for help with portion control and general healthy eating prior to surgery. We discussed measuring meals to 1/2 cup total per meal after surgery. Drinking only calorie-free, sugar-free and non-carbonated beverages. We discussed the importance of drinking 64 ounces of fluid per day to prevent dehydration post-operatively. Patient's current diet habits include: Pt is eating 3 meals per day. Breakfast is a protein shake (Glucerna Hunger Smart) in place of skipping. If eating breakfast is boiled eggs and peanut butter. Snack choices include none. Eating more salads and protein (salmon, steak). Pt is eating refined carbohydrate foods (bread, pasta, rice, potatoes) in moderation. Pt is eating sweets/desserts none. Pt is using mostly healthy cooking methods. Pt is eating meals prepared outside of the home none this past month. Pt is drinking mostly water and using Crystal Light PRN. Pt reports no to emotional eating. Physical Activity/Exercise  We talked about the importance of increasing daily physical activity and beginning to develop an exercise regimen/routine. We talked about exercise as being an important part of long term weight loss after surgery. Comments:  During class, I discussed with patient the importance of getting into an exercise routine. Pt is currently walking 1 mile, 3 times per week and plans to start with a  next month for activity. Pt has been encouraged to maintain and increase as tolerated. Behavior Modification       We talked about how to eat more mindfully. Tips and recommendations for how to make these changes were provided. Pt was encouraged to keep a food journal and record what they were taking in daily. Overall Assessment: Pt demonstrates appropriate lifestyle changes evidenced by reported changes and continued wt loss. Will continue to assess. Patient-Set Goals:   1. Nutrition - continue to eat 3 meals a day with protein focused meals   2.  Exercise - maintain and increase walking as tolerated   3.  Behavior -protein first at meals and practice not drinking with meals (30/30 rule)     Hudson Brain, RD  6/17/2022

## 2022-07-15 ENCOUNTER — CLINICAL SUPPORT (OUTPATIENT)
Dept: SURGERY | Age: 42
End: 2022-07-15

## 2022-07-15 DIAGNOSIS — E66.01 MORBID OBESITY WITH BODY MASS INDEX (BMI) OF 60.0 TO 69.9 IN ADULT (HCC): Primary | ICD-10-CM

## 2022-07-15 NOTE — PROGRESS NOTES
[facility] at [facility]  Supervised Weight Loss     Date:   7/15/2022    Patient's Name: Zarina Rasheed  : 1980    Insurance:  [facility]                              Session: 5 of  6  Surgery: Gastric Bypass                  Surgeon: RINKU Jackson      Height: 65\"                Previously Reported CC:    [id number]      BZY.                               BMI: 31             Pounds Lost since last month: 0#               Pounds Gained since last month: 0     Starting Weight: 380#                         Previous Months Weight: 357#  Overall Pounds Lost: 23#                  Overall Pounds Gained: 0     Other Pertinent Information: Today's appointment was completed over the phone. Pt has been recommended to lose 38# while in the program. Reports recent HgbA1c was 10% (was previously 12%). Smoking Status:  none  Alcohol Intake: none    I have reviewed with pt the guidelines of the supervised wt loss program.  Pt understands the expectations of some wt loss during the program and that wt gain could delay the process. I have also explained that appointments need to be consecutive and missing an appointment may result in starting over. Pt has received this information in writing. Changes that patient has made since last month include:  Reports no lifestyle changes this past month d/t starting a new job (6 am - 6 pm - Monday - Friday). Eating Habits and Behaviors  A nutrition lesson specific to vitamins was provided. We discussed the various reasons for needing vitamins and different types and doses. General healthy eating guidelines were also discussed. Pts were instructed that their plate should be made up 1/2 plate coming from non-starchy vegetables, 1/4 coming from lean meat, and 1/4 of their plate coming from carbohydrates, including fruits, starches, or milk. We discussed measuring meals to 1/2 cup total per meal after surgery.  Drinking only calorie-free, sugar-free and non-carbonated beverages. We discussed the importance of drinking 64 ounces of fluid per day to prevent dehydration post-operatively. Patient's current diet habits include: Pt is eating 1 food-meals per day (dinner is tuna or chicken salad). Drinking 2 protein shakes (Body [place] and Glucerna Protein). Snack choices include fruit. Pt is eating refined carbohydrate foods (bread, pasta, rice, potatoes) in moderation. Pt is using baked cooking methods. Pt is eating meals prepared outside of the home once this past month. Pt is drinking mostly water and occasional lemonade. No sodas. Pt reports no to emotional eating. Physical Activity/Exercise  We talked about the importance of increasing daily physical activity and beginning to develop an exercise regimen/routine. We talked about exercise as being an important part of long term weight loss after surgery. Comments:  During class, I discussed with patient the importance of getting into an exercise routine. Pt is currently active with her new job. Pt has been encouraged to maintain and implement additional exercise as tolerated/able. Behavior Modification       We talked about how to eat more mindfully and identify emotional eating triggers. Tips and recommendations for how to make these changes were provided. Pt was encouraged to keep a food journal and record what they were taking in daily. Overall Assessment: Pt demonstrates some small lifestyle changes evidenced by reported changes and previously reported wt loss. Will continue to assess. Patient-Set Goals:   1. Nutrition - limit protein shake to only 1 meal per day, food at the other two meals   2. Exercise - maintain activity level as tolerated.    3. Behavior -print and review vitamin recommendations    Floyd Soliz RD  7/15/2022

## 2022-08-12 ENCOUNTER — CLINICAL SUPPORT (OUTPATIENT)
Dept: SURGERY | Age: 42
End: 2022-08-12

## 2022-08-12 DIAGNOSIS — E66.01 MORBID OBESITY WITH BODY MASS INDEX (BMI) OF 60.0 TO 69.9 IN ADULT (HCC): Primary | ICD-10-CM

## 2022-08-12 NOTE — PROGRESS NOTES
[facility]  Supervised Weight Loss     Date:   2022    Patient's Name: Carlos Carranza  : 1980    Insurance:  [employer]                              Session: 6 of  6  Surgery: Gastric Bypass                  Surgeon:  Norma Martinez D.O. Height: 65\"                Previously Reported Wt:    357      Lbs. BMI: 59             Pounds Lost since last month: 0#               Pounds Gained since last month: 0     Starting Weight: 380#                         Previous Months Weight: 357#  Overall Pounds Lost: 23#                  Overall Pounds Gained: 0     Other Pertinent Information: Today's appointment was completed over the phone. Pt has been recommended to lose 38# while in the program. Reports recent HgbA1c was 10% (was previously 12%). Pt works 12 hours, 5 times per week. Smoking Status:  none  Alcohol Intake: none    I have reviewed with pt the guidelines of the supervised wt loss program.  Pt understands the expectations of some wt loss during the program and that wt gain could delay the process. I have also explained that appointments need to be consecutive and missing an appointment may result in starting over. Pt has received this information in writing. Changes that patient has made since last month include:  maintaining previously made changes. Eating Habits and Behaviors  General healthy eating guidelines were also discussed. Pts were instructed that their plate should be made up 1/2 plate coming from non-starchy vegetables, 1/4 coming from lean meat, and 1/4 of their plate coming from carbohydrates, including fruits, starches, or milk. We discussed measuring meals to 1/2 cup total per meal after surgery. Drinking only calorie-free, sugar-free and non-carbonated beverages. We discussed the importance of drinking 64 ounces of fluid per day to prevent dehydration post-operatively.        Diet recall - \"terrible\" and eating only 1 time per day. Drinking protein shakes 2 times per day (Glucerna Hunger Smart). Drinking mostly water, Propel and Crystal Light. Physical Activity/Exercise  We talked about the importance of increasing daily physical activity and beginning to develop an exercise regimen/routine. We talked about exercise as being an important part of long term weight loss after surgery. Comments:  During class, I discussed with patient the importance of getting into an exercise routine. 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 Pt has been encouraged to maintain and increase as tolerated. We discussed the need to eventually implement exercise in addition to activity at work. Behavior Modification       A behavior modification lesson was provided with an emphasis on developing mindful eating behaviors. We talked about how to eat more mindfully and identify emotional eating triggers. Tips and recommendations for how to make these changes were provided. Pt was encouraged to keep a food journal and record what they were taking in daily. Patient's reported eating behaviors: Implementing mindful eating habits. Overall Assessment: Pt demonstrates small/appropriate lifestyle changes evidenced by reported changes and reported wt loss. Demonstrates understanding of nutrition guidelines and was encouraged to further review education materials (emailed 2-3 times) while waiting to complete remainder of insurance requirements (psychological evaluation and HgbA1c at 8 or below). Otherwise appears to be an appropriate candidate. Patient-Set Goals:   1. Nutrition - 3 meals a day and limit use of shakes to 1 meal per day   2. Exercise - implement intentional exercise to promote continued wt loss   3.  Behavior -print and review nutrition education materials, practice 30/30 lorenzo Christy, SANG  8/12/2022

## 2022-10-05 ENCOUNTER — TELEPHONE (OUTPATIENT)
Dept: OBGYN CLINIC | Age: 42
End: 2022-10-05

## 2022-10-05 NOTE — TELEPHONE ENCOUNTER
Patient left a my chart message, called patient to let her know I schedule her an appointment for oct 18th, patient said she went to [facility] [place] but left due to they was so busy. She's very weak and heavy bleeding since may. She stated doctor told her may need a hysterectomy. patient wants a sooner appointment if possible.

## 2022-10-10 ENCOUNTER — OFFICE VISIT (OUTPATIENT)
Dept: OBGYN CLINIC | Age: 42
End: 2022-10-10
Payer: COMMERCIAL

## 2022-10-10 VITALS
BODY MASS INDEX: 48.82 KG/M2 | HEIGHT: 65 IN | RESPIRATION RATE: 18 BRPM | WEIGHT: 293 LBS | HEART RATE: 89 BPM | SYSTOLIC BLOOD PRESSURE: 141 MMHG | DIASTOLIC BLOOD PRESSURE: 81 MMHG | OXYGEN SATURATION: 98 %

## 2022-10-10 DIAGNOSIS — D64.9 ANEMIA, UNSPECIFIED TYPE: ICD-10-CM

## 2022-10-10 DIAGNOSIS — N92.6 IRREGULAR MENSES: Primary | ICD-10-CM

## 2022-10-10 PROCEDURE — 99213 OFFICE O/P EST LOW 20 MIN: CPT | Performed by: OBSTETRICS & GYNECOLOGY

## 2022-10-10 RX ORDER — INSULIN GLARGINE 100 [IU]/ML
40 INJECTION, SOLUTION SUBCUTANEOUS
COMMUNITY

## 2022-10-10 RX ORDER — INSULIN ASPART 100 [IU]/ML
2 INJECTION, SOLUTION INTRAVENOUS; SUBCUTANEOUS
COMMUNITY

## 2022-10-13 LAB
BASOPHILS # BLD AUTO: 0 X10E3/UL (ref 0–0.2)
BASOPHILS NFR BLD AUTO: 0 %
EOSINOPHIL # BLD AUTO: 0.1 X10E3/UL (ref 0–0.4)
EOSINOPHIL NFR BLD AUTO: 1 %
ERYTHROCYTE [DISTWIDTH] IN BLOOD BY AUTOMATED COUNT: 13.2 % (ref 11.7–15.4)
HCT VFR BLD AUTO: 39.5 % (ref 34–46.6)
HGB BLD-MCNC: 13.2 G/DL (ref 11.1–15.9)
IMM GRANULOCYTES # BLD AUTO: 0 X10E3/UL (ref 0–0.1)
IMM GRANULOCYTES NFR BLD AUTO: 0 %
LYMPHOCYTES # BLD AUTO: 4.7 X10E3/UL (ref 0.7–3.1)
LYMPHOCYTES NFR BLD AUTO: 51 %
MCH RBC QN AUTO: 29.8 PG (ref 26.6–33)
MCHC RBC AUTO-ENTMCNC: 33.4 G/DL (ref 31.5–35.7)
MCV RBC AUTO: 89 FL (ref 79–97)
MONOCYTES # BLD AUTO: 0.6 X10E3/UL (ref 0.1–0.9)
MONOCYTES NFR BLD AUTO: 7 %
NEUTROPHILS # BLD AUTO: 3.7 X10E3/UL (ref 1.4–7)
NEUTROPHILS NFR BLD AUTO: 41 %
PLATELET # BLD AUTO: 309 X10E3/UL (ref 150–450)
RBC # BLD AUTO: 4.43 X10E6/UL (ref 3.77–5.28)
WBC # BLD AUTO: 9.2 X10E3/UL (ref 3.4–10.8)

## 2022-10-17 NOTE — PROGRESS NOTES
Philomena Peguero is a 35 y.o. female, , No LMP recorded. Patient has had an ablation. , who presents today for the following:  Chief Complaint   Patient presents with    Vaginal Bleeding        No Known Allergies    Current Outpatient Medications   Medication Sig    insulin glargine (Lantus Solostar U-100 Insulin) 100 unit/mL (3 mL) inpn 40 Units by SubCUTAneous route.  insulin aspart U-100 (NOVOLOG) 100 unit/mL (3 mL) inpn 2 Units by SubCUTAneous route. 2 units with breakfast and lunch. 4 units with larger dinner.  norethindrone-ethinyl estradiol (ORTHO-NOVUM 1-35 TAB, NORTREL 1-35 TAB) 1-35 mg-mcg tab Take 1 Tablet by mouth daily.  liraglutide (Victoza 2-Jonn) 0.6 mg/0.1 mL (18 mg/3 mL) pnij See Instructions, start with 0.6 mg daily and increase gradually to1.8 mg in 2 weeks SQ daily, # 15 mL, 2 Refills, Pharmacy: [facility]/pharmacy #[id number]    albuterol (PROVENTIL HFA, VENTOLIN HFA, PROAIR HFA) 90 mcg/actuation inhaler Take  by inhalation.  medroxyPROGESTERone (PROVERA) 10 mg tablet TAKE 1 TABLET BY MOUTH EVERY DAY FOR 10 DAYS (Patient not taking: Reported on 10/10/2022)    ergocalciferol (ERGOCALCIFEROL) 1,250 mcg (50,000 unit) capsule Take 50,000 Units by mouth. Once a week. (Patient not taking: Reported on 10/10/2022)    metFORMIN (GLUCOPHAGE) 500 mg tablet Take 500 mg by mouth two (2) times daily (with meals). (Patient not taking: Reported on 10/10/2022)    furosemide (LASIX) 40 mg tablet Take  by mouth daily. No current facility-administered medications for this visit.        Past Medical History:   Diagnosis Date    Arthritis     back    Asthma     Chronic pain     back pains/ disc compression    Diabetes ([address]     Hypertension     Morbid obesity ([address]        Past Surgical History:   Procedure Laterality Date    HX  SECTION      IR ABLATION VEIN EXT INITIAL      x2 per pt       Family History   Problem Relation Age of Onset    Diabetes Mother     Heart Disease Mother  Hypertension Mother     Stroke Mother        Social History     Socioeconomic History    Marital status: SINGLE     Spouse name: Not on file    Number of children: Not on file    Years of education: Not on file    Highest education level: Not on file   Occupational History    Not on file   Tobacco Use    Smoking status: Never    Smokeless tobacco: Never   Vaping Use    Vaping Use: Never used   Substance and Sexual Activity    Alcohol use: Yes     Comment: socially    Drug use: Yes     Types: Marijuana     Comment: every once in a while, last used 4/17/22    Sexual activity: Yes   Other Topics Concern    Not on file   Social History Narrative    Not on file     Social Determinants of Health     Financial Resource Strain: Not on file   Food Insecurity: Not on file   Transportation Needs: Not on file   Physical Activity: Not on file   Stress: Not on file   Social Connections: Not on file   Intimate Partner Violence: Not on file   Housing Stability: Not on file         Vaginal Bleeding  Patient presents for evaluation  Reports irregular bleeding  States she has been experiencing bleeding almost daily for > 1 month  Described as light spotting to moderate flow  Hx of ablation   Reports pelvic cramping         Review of Systems   Constitutional: Negative. Respiratory: Negative. Cardiovascular: Negative. Gastrointestinal: Negative. Genitourinary:  Positive for vaginal bleeding. Musculoskeletal: Negative. Skin: Negative. Neurological: Negative. Endo/Heme/Allergies: Negative. Psychiatric/Behavioral: Negative. All other systems reviewed and are negative. BP (!) 141/81 (BP 1 Location: Right upper arm, BP Patient Position: Sitting)   Pulse 89   Resp 18   Ht 5' 5\" (1.651 m)   Wt (!) 365 lb (165.6 kg)   SpO2 98%   BMI 60.74 kg/m²    OBGyn Exam   PE:  Constitutional: General Appearance: healthy-appearing, well-nourished, well-developed, and well groomed.      Psychiatric: Orientation: to time, place, and person. Mood and Affect: normal mood and affect and appropriate and active and alert. Abdomen: Auscultation/Inspection/Palpation: no tenderness and mass and non-distended. Hernia: none palpated. Female Genitalia: Vulva: no masses, atrophy, or lesions. Bladder/Urethra: no urethral discharge or mass and normal meatus and bladder non distended. Vagina no tenderness, erythema, cystocele, rectocele, abnormal vaginal discharge, or vesicle(s) or ulcers. Scant old blood    Cervix: no discharge or cervical motion tenderness and grossly normal.     Uterus: mobile, non-tender, and no uterine prolapse. Adnexa/Parametria: no adnexal tenderness. 1. Irregular menses    - norethindrone-ethinyl estradiol (ORTHO-NOVUM 1-35 TAB, NORTREL 1-35 TAB) 1-35 mg-mcg tab; Take 1 Tablet by mouth daily. Dispense: 1 Dose Pack; Refill: 2  - US TRANSVAGINAL; Future    2. Anemia, unspecified type    - CBC WITH AUTOMATED DIFF;  Future

## 2022-11-01 ENCOUNTER — TELEPHONE (OUTPATIENT)
Dept: SURGERY | Age: 42
End: 2022-11-01

## 2022-11-01 NOTE — TELEPHONE ENCOUNTER
Pt called in she states she is having a hard time getting psych clearance. She is going to call more providers on the list to see if she can get in with them. She is aware Dr Martinez is going out on maternity leave and is ok waiting for surgery in 2023. I let her know depending on when we get her requirements will depend on when surgery will happen due to review appointment and submitting for prior auth. She is wanting to know if she should continue seeing the nutritionist.  I let her know I will send a message to Dr Martinez to see what she suggests due to she met requirements for insurance.

## 2022-11-01 NOTE — TELEPHONE ENCOUNTER
Returning patients call. LM letting her know that I'm still waiting for her [facility] evel and PCP support form. Left my direct phone number to call me back with any other questions.

## 2022-11-11 ENCOUNTER — HOSPITAL ENCOUNTER (OUTPATIENT)
Dept: MAMMOGRAPHY | Age: 42
Discharge: HOME | End: 2022-11-11
Payer: COMMERCIAL

## 2022-11-11 DIAGNOSIS — Z12.31 SCREENING MAMMOGRAM FOR HIGH-RISK PATIENT: ICD-10-CM

## 2022-11-11 PROCEDURE — 77063 BREAST TOMOSYNTHESIS BI: CPT

## 2023-01-03 DIAGNOSIS — N92.6 IRREGULAR MENSES: ICD-10-CM

## 2023-01-04 RX ORDER — NORETHINDRONE AND ETHINYL ESTRADIOL 1 MG-35MCG
KIT ORAL
Qty: 28 TABLET | Refills: 2 | Status: SHIPPED | OUTPATIENT
Start: 2023-01-04

## 2023-01-12 ENCOUNTER — TELEPHONE (OUTPATIENT)
Dept: SURGERY | Age: 43
End: 2023-01-12

## 2023-01-12 NOTE — TELEPHONE ENCOUNTER
Regarding: FW: Ask a question about anything else needed to be done. .   Patient is needing know next step    Dede Del Rosario   ----- Message -----  From: Arsalan Baires  Sent: 6/01/5761  10:09 AM EST  To: June Smith  Subject: Ask a question about anything else needed to#    ----- Message from Travon Quinn sent at 1/11/2023 10:09 AM EST -----  Patient is wondering if any thing else needs to be done.  I see it was discussed in November but I'm not sure if there is any thing else?     ----- Message from Primo Bah" to Ld Martinez, DO sent at 1/11/2023  9:48 AM -----   Can some 1 give me a call [phone] please

## 2023-01-25 RX ORDER — MEDROXYPROGESTERONE ACETATE 10 MG/1
TABLET ORAL
Qty: 10 TABLET | Refills: 2 | Status: SHIPPED | OUTPATIENT
Start: 2023-01-25

## 2023-02-06 ENCOUNTER — APPOINTMENT (OUTPATIENT)
Dept: CT IMAGING | Age: 43
DRG: 139 | End: 2023-02-06
Attending: EMERGENCY MEDICINE
Payer: COMMERCIAL

## 2023-02-06 ENCOUNTER — HOSPITAL ENCOUNTER (INPATIENT)
Age: 43
LOS: 8 days | Discharge: HOME | DRG: 139 | End: 2023-02-14
Attending: EMERGENCY MEDICINE | Admitting: INTERNAL MEDICINE
Payer: COMMERCIAL

## 2023-02-06 ENCOUNTER — APPOINTMENT (OUTPATIENT)
Dept: GENERAL RADIOLOGY | Age: 43
DRG: 139 | End: 2023-02-06
Attending: EMERGENCY MEDICINE
Payer: COMMERCIAL

## 2023-02-06 DIAGNOSIS — J18.9 MULTIFOCAL PNEUMONIA: Primary | ICD-10-CM

## 2023-02-06 DIAGNOSIS — J45.21 MILD INTERMITTENT ASTHMA WITH ACUTE EXACERBATION: ICD-10-CM

## 2023-02-06 PROBLEM — A41.9 SEPSIS (HCC): Status: ACTIVE | Noted: 2023-02-06

## 2023-02-06 PROBLEM — J45.901 ASTHMA EXACERBATION: Status: ACTIVE | Noted: 2023-02-06

## 2023-02-06 LAB
ALBUMIN SERPL-MCNC: 3.1 G/DL (ref 3.5–5)
ALBUMIN/GLOB SERPL: 0.6 (ref 1.1–2.2)
ALP SERPL-CCNC: 80 U/L (ref 45–117)
ALT SERPL-CCNC: 18 U/L (ref 12–78)
ANION GAP SERPL CALC-SCNC: 10 MMOL/L (ref 5–15)
AST SERPL W P-5'-P-CCNC: 28 U/L (ref 15–37)
BASOPHILS # BLD: 0 K/UL (ref 0–0.1)
BASOPHILS NFR BLD: 0 % (ref 0–1)
BILIRUB SERPL-MCNC: 0.2 MG/DL (ref 0.2–1)
BUN SERPL-MCNC: 6 MG/DL (ref 6–20)
BUN/CREAT SERPL: 7 (ref 12–20)
CA-I BLD-MCNC: 9.6 MG/DL (ref 8.5–10.1)
CHLORIDE SERPL-SCNC: 98 MMOL/L (ref 97–108)
CO2 SERPL-SCNC: 28 MMOL/L (ref 21–32)
CREAT SERPL-MCNC: 0.82 MG/DL (ref 0.55–1.02)
DIFFERENTIAL METHOD BLD: ABNORMAL
EOSINOPHIL # BLD: 0.1 K/UL (ref 0–0.4)
EOSINOPHIL NFR BLD: 1 % (ref 0–7)
ERYTHROCYTE [DISTWIDTH] IN BLOOD BY AUTOMATED COUNT: 14.4 % (ref 11.5–14.5)
FLUAV AG NPH QL IA: NEGATIVE
FLUBV AG NOSE QL IA: NEGATIVE
GLOBULIN SER CALC-MCNC: 5.4 G/DL (ref 2–4)
GLUCOSE BLD STRIP.AUTO-MCNC: 316 MG/DL (ref 65–100)
GLUCOSE SERPL-MCNC: 235 MG/DL (ref 65–100)
HCT VFR BLD AUTO: 40.6 % (ref 35–47)
HGB BLD-MCNC: 13.2 G/DL (ref 11.5–16)
IMM GRANULOCYTES # BLD AUTO: 0 K/UL (ref 0–0.04)
IMM GRANULOCYTES NFR BLD AUTO: 0 % (ref 0–0.5)
LACTATE SERPL-SCNC: 3.4 MMOL/L (ref 0.4–2)
LYMPHOCYTES # BLD: 3.5 K/UL (ref 0.8–3.5)
LYMPHOCYTES NFR BLD: 52 % (ref 12–49)
MCH RBC QN AUTO: 28.9 PG (ref 26–34)
MCHC RBC AUTO-ENTMCNC: 32.5 G/DL (ref 30–36.5)
MCV RBC AUTO: 88.8 FL (ref 80–99)
MONOCYTES # BLD: 0.8 K/UL (ref 0–1)
MONOCYTES NFR BLD: 11 % (ref 5–13)
NEUTS SEG # BLD: 2.5 K/UL (ref 1.8–8)
NEUTS SEG NFR BLD: 36 % (ref 32–75)
PERFORMED BY, TECHID: ABNORMAL
PLATELET # BLD AUTO: 278 K/UL (ref 150–400)
PMV BLD AUTO: 9.8 FL (ref 8.9–12.9)
POTASSIUM SERPL-SCNC: 4.2 MMOL/L (ref 3.5–5.1)
PROT SERPL-MCNC: 8.5 G/DL (ref 6.4–8.2)
RBC # BLD AUTO: 4.57 M/UL (ref 3.8–5.2)
SODIUM SERPL-SCNC: 136 MMOL/L (ref 136–145)
WBC # BLD AUTO: 6.8 K/UL (ref 3.6–11)

## 2023-02-06 PROCEDURE — 80053 COMPREHEN METABOLIC PANEL: CPT

## 2023-02-06 PROCEDURE — 74011000250 HC RX REV CODE- 250

## 2023-02-06 PROCEDURE — 71045 X-RAY EXAM CHEST 1 VIEW: CPT

## 2023-02-06 PROCEDURE — 65270000029 HC RM PRIVATE

## 2023-02-06 PROCEDURE — 94640 AIRWAY INHALATION TREATMENT: CPT

## 2023-02-06 PROCEDURE — 36415 COLL VENOUS BLD VENIPUNCTURE: CPT

## 2023-02-06 PROCEDURE — 74011250636 HC RX REV CODE- 250/636: Performed by: EMERGENCY MEDICINE

## 2023-02-06 PROCEDURE — 83605 ASSAY OF LACTIC ACID: CPT

## 2023-02-06 PROCEDURE — 96365 THER/PROPH/DIAG IV INF INIT: CPT

## 2023-02-06 PROCEDURE — 87804 INFLUENZA ASSAY W/OPTIC: CPT

## 2023-02-06 PROCEDURE — 85025 COMPLETE CBC W/AUTO DIFF WBC: CPT

## 2023-02-06 PROCEDURE — 74011000636 HC RX REV CODE- 636: Performed by: EMERGENCY MEDICINE

## 2023-02-06 PROCEDURE — 99285 EMERGENCY DEPT VISIT HI MDM: CPT

## 2023-02-06 PROCEDURE — 74011000250 HC RX REV CODE- 250: Performed by: EMERGENCY MEDICINE

## 2023-02-06 PROCEDURE — 71275 CT ANGIOGRAPHY CHEST: CPT

## 2023-02-06 PROCEDURE — 82962 GLUCOSE BLOOD TEST: CPT

## 2023-02-06 PROCEDURE — 96366 THER/PROPH/DIAG IV INF ADDON: CPT

## 2023-02-06 PROCEDURE — 87040 BLOOD CULTURE FOR BACTERIA: CPT

## 2023-02-06 RX ORDER — MAGNESIUM SULFATE HEPTAHYDRATE 40 MG/ML
2 INJECTION, SOLUTION INTRAVENOUS ONCE
Status: COMPLETED | OUTPATIENT
Start: 2023-02-06 | End: 2023-02-06

## 2023-02-06 RX ORDER — IPRATROPIUM BROMIDE AND ALBUTEROL SULFATE 2.5; .5 MG/3ML; MG/3ML
SOLUTION RESPIRATORY (INHALATION)
Status: COMPLETED
Start: 2023-02-06 | End: 2023-02-06

## 2023-02-06 RX ORDER — IPRATROPIUM BROMIDE AND ALBUTEROL SULFATE 2.5; .5 MG/3ML; MG/3ML
3 SOLUTION RESPIRATORY (INHALATION)
Status: COMPLETED | OUTPATIENT
Start: 2023-02-06 | End: 2023-02-06

## 2023-02-06 RX ORDER — ALBUTEROL SULFATE 2.5 MG/.5ML
2.5 SOLUTION RESPIRATORY (INHALATION)
Status: COMPLETED | OUTPATIENT
Start: 2023-02-06 | End: 2023-02-06

## 2023-02-06 RX ADMIN — IPRATROPIUM BROMIDE AND ALBUTEROL SULFATE 3 ML: 2.5; .5 SOLUTION RESPIRATORY (INHALATION) at 19:14

## 2023-02-06 RX ADMIN — CEFTRIAXONE SODIUM 1 G: 1 INJECTION, POWDER, FOR SOLUTION INTRAMUSCULAR; INTRAVENOUS at 23:39

## 2023-02-06 RX ADMIN — IOPAMIDOL 100 ML: 755 INJECTION, SOLUTION INTRAVENOUS at 22:30

## 2023-02-06 RX ADMIN — MAGNESIUM SULFATE HEPTAHYDRATE 2 G: 40 INJECTION, SOLUTION INTRAVENOUS at 19:27

## 2023-02-06 RX ADMIN — IPRATROPIUM BROMIDE AND ALBUTEROL SULFATE 3 ML: 2.5; .5 SOLUTION RESPIRATORY (INHALATION) at 19:05

## 2023-02-06 RX ADMIN — METHYLPREDNISOLONE SODIUM SUCCINATE 125 MG: 125 INJECTION, POWDER, FOR SOLUTION INTRAMUSCULAR; INTRAVENOUS at 19:23

## 2023-02-06 RX ADMIN — IPRATROPIUM BROMIDE AND ALBUTEROL SULFATE 3 ML: .5; 2.5 SOLUTION RESPIRATORY (INHALATION) at 19:05

## 2023-02-06 RX ADMIN — ALBUTEROL SULFATE 2.5 MG: 2.5 SOLUTION RESPIRATORY (INHALATION) at 22:10

## 2023-02-06 RX ADMIN — AZITHROMYCIN MONOHYDRATE 500 MG: 500 INJECTION, POWDER, LYOPHILIZED, FOR SOLUTION INTRAVENOUS at 23:39

## 2023-02-06 NOTE — Clinical Note
Status[de-identified] INPATIENT [101]   Type of Bed: Remote Telemetry [29]   Cardiac Monitoring Required?: Yes   Inpatient Hospitalization Certified Necessary for the Following Reasons: 9.  Other (further clarification in H&P documentation)   Admitting Diagnosis: Multifocal pneumonia [4750371]   Admitting Diagnosis: Asthma exacerbation [[id number]]   Admitting Diagnosis: Sepsis [facility] [[id number]]   Admitting Physician: Ciara DAWN [facility] [[id number]]   Attending Physician: Holly Alexander [[id number]]   Estimated Length of Stay: 2 Midnights   Discharge Plan[de-identified] Home with Office Follow-up

## 2023-02-07 LAB
ALBUMIN SERPL-MCNC: 3 G/DL (ref 3.5–5)
ALBUMIN/GLOB SERPL: 0.6 (ref 1.1–2.2)
ALP SERPL-CCNC: 74 U/L (ref 45–117)
ALT SERPL-CCNC: 19 U/L (ref 12–78)
ANION GAP SERPL CALC-SCNC: 7 MMOL/L (ref 5–15)
AST SERPL W P-5'-P-CCNC: 14 U/L (ref 15–37)
BILIRUB SERPL-MCNC: 0.2 MG/DL (ref 0.2–1)
BUN SERPL-MCNC: 11 MG/DL (ref 6–20)
BUN/CREAT SERPL: 13 (ref 12–20)
CA-I BLD-MCNC: 9.6 MG/DL (ref 8.5–10.1)
CHLORIDE SERPL-SCNC: 100 MMOL/L (ref 97–108)
CO2 SERPL-SCNC: 27 MMOL/L (ref 21–32)
CREAT SERPL-MCNC: 0.86 MG/DL (ref 0.55–1.02)
ERYTHROCYTE [DISTWIDTH] IN BLOOD BY AUTOMATED COUNT: 14.4 % (ref 11.5–14.5)
GLOBULIN SER CALC-MCNC: 5.2 G/DL (ref 2–4)
GLUCOSE BLD STRIP.AUTO-MCNC: 321 MG/DL (ref 65–100)
GLUCOSE BLD STRIP.AUTO-MCNC: 340 MG/DL (ref 65–100)
GLUCOSE SERPL-MCNC: 367 MG/DL (ref 65–100)
HCT VFR BLD AUTO: 39.3 % (ref 35–47)
HGB BLD-MCNC: 12.6 G/DL (ref 11.5–16)
LACTATE SERPL-SCNC: 2.9 MMOL/L (ref 0.4–2)
MCH RBC QN AUTO: 28.1 PG (ref 26–34)
MCHC RBC AUTO-ENTMCNC: 32.1 G/DL (ref 30–36.5)
MCV RBC AUTO: 87.7 FL (ref 80–99)
NRBC # BLD: 0 K/UL (ref 0–0.01)
NRBC BLD-RTO: 0 PER 100 WBC
PERFORMED BY, TECHID: ABNORMAL
PERFORMED BY, TECHID: ABNORMAL
PLATELET # BLD AUTO: 312 K/UL (ref 150–400)
PMV BLD AUTO: 10 FL (ref 8.9–12.9)
POTASSIUM SERPL-SCNC: 4.3 MMOL/L (ref 3.5–5.1)
PROT SERPL-MCNC: 8.2 G/DL (ref 6.4–8.2)
RBC # BLD AUTO: 4.48 M/UL (ref 3.8–5.2)
SODIUM SERPL-SCNC: 134 MMOL/L (ref 136–145)
WBC # BLD AUTO: 6.5 K/UL (ref 3.6–11)

## 2023-02-07 PROCEDURE — 36415 COLL VENOUS BLD VENIPUNCTURE: CPT

## 2023-02-07 PROCEDURE — 74011250636 HC RX REV CODE- 250/636: Performed by: EMERGENCY MEDICINE

## 2023-02-07 PROCEDURE — 83605 ASSAY OF LACTIC ACID: CPT

## 2023-02-07 PROCEDURE — 65270000029 HC RM PRIVATE

## 2023-02-07 PROCEDURE — 74011250636 HC RX REV CODE- 250/636: Performed by: STUDENT IN AN ORGANIZED HEALTH CARE EDUCATION/TRAINING PROGRAM

## 2023-02-07 PROCEDURE — 94640 AIRWAY INHALATION TREATMENT: CPT

## 2023-02-07 PROCEDURE — 74011636637 HC RX REV CODE- 636/637: Performed by: EMERGENCY MEDICINE

## 2023-02-07 PROCEDURE — 82962 GLUCOSE BLOOD TEST: CPT

## 2023-02-07 PROCEDURE — 74011000250 HC RX REV CODE- 250: Performed by: STUDENT IN AN ORGANIZED HEALTH CARE EDUCATION/TRAINING PROGRAM

## 2023-02-07 PROCEDURE — 74011636637 HC RX REV CODE- 636/637: Performed by: STUDENT IN AN ORGANIZED HEALTH CARE EDUCATION/TRAINING PROGRAM

## 2023-02-07 PROCEDURE — 85027 COMPLETE CBC AUTOMATED: CPT

## 2023-02-07 PROCEDURE — 80053 COMPREHEN METABOLIC PANEL: CPT

## 2023-02-07 PROCEDURE — 83036 HEMOGLOBIN GLYCOSYLATED A1C: CPT

## 2023-02-07 PROCEDURE — 74011250637 HC RX REV CODE- 250/637: Performed by: EMERGENCY MEDICINE

## 2023-02-07 PROCEDURE — 74011000250 HC RX REV CODE- 250

## 2023-02-07 RX ORDER — ONDANSETRON 2 MG/ML
4 INJECTION INTRAMUSCULAR; INTRAVENOUS
Status: DISCONTINUED | OUTPATIENT
Start: 2023-02-07 | End: 2023-02-14 | Stop reason: HOSPADM

## 2023-02-07 RX ORDER — SODIUM CHLORIDE 0.9 % (FLUSH) 0.9 %
5-40 SYRINGE (ML) INJECTION AS NEEDED
Status: DISCONTINUED | OUTPATIENT
Start: 2023-02-07 | End: 2023-02-14 | Stop reason: HOSPADM

## 2023-02-07 RX ORDER — DEXTROSE MONOHYDRATE 100 MG/ML
0-250 INJECTION, SOLUTION INTRAVENOUS AS NEEDED
Status: DISCONTINUED | OUTPATIENT
Start: 2023-02-07 | End: 2023-02-14 | Stop reason: HOSPADM

## 2023-02-07 RX ORDER — ALBUTEROL SULFATE 2.5 MG/.5ML
2.5 SOLUTION RESPIRATORY (INHALATION)
Status: DISCONTINUED | OUTPATIENT
Start: 2023-02-07 | End: 2023-02-09

## 2023-02-07 RX ORDER — SODIUM CHLORIDE 0.9 % (FLUSH) 0.9 %
5-40 SYRINGE (ML) INJECTION EVERY 8 HOURS
Status: DISCONTINUED | OUTPATIENT
Start: 2023-02-07 | End: 2023-02-14 | Stop reason: HOSPADM

## 2023-02-07 RX ORDER — ONDANSETRON 4 MG/1
4 TABLET, ORALLY DISINTEGRATING ORAL
Status: DISCONTINUED | OUTPATIENT
Start: 2023-02-07 | End: 2023-02-14 | Stop reason: HOSPADM

## 2023-02-07 RX ORDER — INSULIN LISPRO 100 [IU]/ML
INJECTION, SOLUTION INTRAVENOUS; SUBCUTANEOUS
Status: DISCONTINUED | OUTPATIENT
Start: 2023-02-07 | End: 2023-02-09

## 2023-02-07 RX ORDER — ACETAMINOPHEN 325 MG/1
650 TABLET ORAL
Status: DISCONTINUED | OUTPATIENT
Start: 2023-02-07 | End: 2023-02-14 | Stop reason: HOSPADM

## 2023-02-07 RX ORDER — ENOXAPARIN SODIUM 100 MG/ML
40 INJECTION SUBCUTANEOUS EVERY 12 HOURS
Status: DISCONTINUED | OUTPATIENT
Start: 2023-02-07 | End: 2023-02-14 | Stop reason: HOSPADM

## 2023-02-07 RX ORDER — ACETAMINOPHEN 650 MG/1
650 SUPPOSITORY RECTAL
Status: DISCONTINUED | OUTPATIENT
Start: 2023-02-07 | End: 2023-02-14 | Stop reason: HOSPADM

## 2023-02-07 RX ORDER — AMLODIPINE BESYLATE 5 MG/1
2.5 TABLET ORAL ONCE
Status: COMPLETED | OUTPATIENT
Start: 2023-02-07 | End: 2023-02-07

## 2023-02-07 RX ORDER — ALBUTEROL SULFATE 2.5 MG/.5ML
SOLUTION RESPIRATORY (INHALATION)
Status: COMPLETED
Start: 2023-02-07 | End: 2023-02-07

## 2023-02-07 RX ORDER — ATORVASTATIN CALCIUM 10 MG/1
10 TABLET, FILM COATED ORAL DAILY
COMMUNITY
Start: 2023-01-24

## 2023-02-07 RX ORDER — INSULIN GLARGINE 100 [IU]/ML
20 INJECTION, SOLUTION SUBCUTANEOUS
Status: DISCONTINUED | OUTPATIENT
Start: 2023-02-07 | End: 2023-02-08

## 2023-02-07 RX ORDER — INSULIN GLARGINE 100 [IU]/ML
50 INJECTION, SOLUTION SUBCUTANEOUS
Status: COMPLETED | OUTPATIENT
Start: 2023-02-07 | End: 2023-02-07

## 2023-02-07 RX ORDER — ALBUTEROL SULFATE 2.5 MG/.5ML
2.5 SOLUTION RESPIRATORY (INHALATION)
Status: DISCONTINUED | OUTPATIENT
Start: 2023-02-07 | End: 2023-02-07

## 2023-02-07 RX ORDER — IBUPROFEN 200 MG
4 TABLET ORAL AS NEEDED
Status: DISCONTINUED | OUTPATIENT
Start: 2023-02-07 | End: 2023-02-14 | Stop reason: HOSPADM

## 2023-02-07 RX ORDER — POLYETHYLENE GLYCOL 3350 17 G/17G
17 POWDER, FOR SOLUTION ORAL DAILY PRN
Status: DISCONTINUED | OUTPATIENT
Start: 2023-02-07 | End: 2023-02-14 | Stop reason: HOSPADM

## 2023-02-07 RX ADMIN — ALBUTEROL SULFATE 2.5 MG: 2.5 SOLUTION RESPIRATORY (INHALATION) at 16:47

## 2023-02-07 RX ADMIN — ALBUTEROL SULFATE 2.5 MG: 2.5 SOLUTION RESPIRATORY (INHALATION) at 21:21

## 2023-02-07 RX ADMIN — SODIUM CHLORIDE, PRESERVATIVE FREE 10 ML: 5 INJECTION INTRAVENOUS at 22:00

## 2023-02-07 RX ADMIN — METHYLPREDNISOLONE SODIUM SUCCINATE 40 MG: 40 INJECTION, POWDER, FOR SOLUTION INTRAMUSCULAR; INTRAVENOUS at 16:41

## 2023-02-07 RX ADMIN — SODIUM CHLORIDE, PRESERVATIVE FREE 10 ML: 5 INJECTION INTRAVENOUS at 15:01

## 2023-02-07 RX ADMIN — INSULIN GLARGINE 50 UNITS: 100 INJECTION, SOLUTION SUBCUTANEOUS at 00:16

## 2023-02-07 RX ADMIN — AMLODIPINE BESYLATE 2.5 MG: 5 TABLET ORAL at 04:13

## 2023-02-07 RX ADMIN — METHYLPREDNISOLONE SODIUM SUCCINATE 40 MG: 40 INJECTION, POWDER, FOR SOLUTION INTRAMUSCULAR; INTRAVENOUS at 21:55

## 2023-02-07 RX ADMIN — ENOXAPARIN SODIUM 40 MG: 100 INJECTION SUBCUTANEOUS at 21:55

## 2023-02-07 RX ADMIN — INSULIN GLARGINE 20 UNITS: 100 INJECTION, SOLUTION SUBCUTANEOUS at 21:55

## 2023-02-07 RX ADMIN — SODIUM CHLORIDE 1000 ML: 9 INJECTION, SOLUTION INTRAVENOUS at 00:16

## 2023-02-07 RX ADMIN — INSULIN LISPRO 7 UNITS: 100 INJECTION, SOLUTION INTRAVENOUS; SUBCUTANEOUS at 16:40

## 2023-02-07 RX ADMIN — CEFTRIAXONE SODIUM 1 G: 1 INJECTION, POWDER, FOR SOLUTION INTRAMUSCULAR; INTRAVENOUS at 15:01

## 2023-02-07 NOTE — ED TRIAGE NOTES
Patient presents SOB, unable to speak full sentences. Cogh. States negative home COVID test today. Hx of asthma. Nebulizer and inhaler used PTA.  Symptoms began Friday/

## 2023-02-07 NOTE — PROGRESS NOTES
Problem: Diabetes Self-Management  Goal: *Disease process and treatment process  Description: Define diabetes and identify own type of diabetes; list 3 options for treating diabetes. Outcome: Progressing Towards Goal  Goal: *Incorporating nutritional management into lifestyle  Description: Describe effect of type, amount and timing of food on blood glucose; list 3 methods for planning meals.   Outcome: Progressing Towards Goal     Problem: General Medical Care Plan  Goal: *Optimal pain control at patient's stated goal  Outcome: Progressing Towards Goal  Goal: *Skin integrity maintained  Outcome: Progressing Towards Goal  Goal: *Optimize nutritional status  Outcome: Progressing Towards Goal  Goal: *Anxiety reduced or absent  Outcome: Progressing Towards Goal  Goal: *Progressive mobility and function (eg: ADL's)  Outcome: Progressing Towards Goal

## 2023-02-07 NOTE — PROGRESS NOTES
Went through admission navigator with patient and completed everything except discharge assessment. Handoff to [facility] RN alerted of need for discharge assessment to be completed. Also alerted that patient family and pcp will need to be updated on patients admission status. This will need to be documented as well in the admission documentation. Informed RN that care plan and education were added.

## 2023-02-07 NOTE — ED NOTES
Patient has ambulated to restroom twice. Still becoming sob with exertion. Resting on stretcher at this time. Cardiac monitor continues to be in place. Call bell in reach. NAD.

## 2023-02-07 NOTE — PROGRESS NOTES
Pharmacist Review and Automatic Dose Adjustment of Prophylactic Enoxaparin    *Review reason for admission/hospital problem list*    The reviewing pharmacist has made an adjustment to the ordered enoxaparin dose or converted to UFH per the approved [facility] protocol and table as identified below. Dwight Viveros is a 38 y.o. female. No lab exists for component: CREATININE    Estimated Creatinine Clearance: 136.8 mL/min (based on SCr of 0.82 mg/dL).     Height:   Ht Readings from Last 1 Encounters:   02/06/23 165.1 cm (65\")     Weight:  Wt Readings from Last 1 Encounters:   02/06/23 (!) 161.9 kg (357 lb)               Plan: Based upon the patient's weight and renal function, the ordered enoxaparin dose of 40mg q24h  has been changed/converted to 40 mg bid       Thank you,  Nicolina Aase, [facility]

## 2023-02-07 NOTE — ED NOTES
Patient A/O, NAD. Respirations unlabored, able to speak in full sentences. Call bell in reach, Cardiac monitor in place. Side rail x1.

## 2023-02-07 NOTE — ED NOTES
SBAR report called to Isidoro Baldwin, RN at [facility]. Patient to be transferred to room 207.  No further questions

## 2023-02-07 NOTE — ED PROVIDER NOTES
[facility] EMERGENCY DEPARTMENT  EMERGENCY DEPARTMENT HISTORY AND PHYSICAL EXAM      Date: 2023  Patient Name: Mirtha Gordon  MRN: [id number]  YOB: 1980  Date of evaluation: 2023  Provider: Dave Arredondo DO   Note Started: 12:26 AM 23    HISTORY OF PRESENT ILLNESS     Chief Complaint   Patient presents with    Shortness of Breath    Cough    Headache       History Provided By: Patient    HPI: Mirtha Gordon, 38 y.o. female with past medical history significant for diabetes, asthma presenting to the emergency department for evaluation of shortness of breath, wheeze over the course of the last couple of days. Reports symptoms acutely worsened today. Reports associated productive cough. Used albuterol inhaler prior to arrival to the emergency department without improvement of symptoms. PAST MEDICAL HISTORY   Past Medical History:  Past Medical History:   Diagnosis Date    Arthritis     back    Asthma     Chronic pain     back pains/ disc compression    Diabetes ([address]     Hypertension     Morbid obesity ([address]        Past Surgical History:  Past Surgical History:   Procedure Laterality Date    HX  SECTION      IR ABLATION VEIN EXT INITIAL      x2 per pt       Family History:  Family History   Problem Relation Age of Onset    Diabetes Mother     Heart Disease Mother     Hypertension Mother     Stroke Mother     Cancer Paternal Grandmother        Social History:  Social History     Tobacco Use    Smoking status: Never    Smokeless tobacco: Never   Vaping Use    Vaping Use: Never used   Substance Use Topics    Alcohol use: Yes     Comment: socially    Drug use: Yes     Types: Marijuana     Comment: every once in a while, last used 22       Allergies:  No Known Allergies    PCP: Ernesto Travis MD    Current Meds:   Previous Medications    ALBUTEROL (PROVENTIL HFA, VENTOLIN HFA, PROAIR HFA) 90 MCG/ACTUATION INHALER    Take  by inhalation.     ALYACEN 1/35, 28, 1-35 MG-MCG TAB    TAKE 1 TABLET BY MOUTH EVERY DAY    ERGOCALCIFEROL (ERGOCALCIFEROL) 1,250 MCG (50,000 UNIT) CAPSULE    Take 50,000 Units by mouth. Once a week. FUROSEMIDE (LASIX) 40 MG TABLET    Take  by mouth daily. INSULIN ASPART U-100 (NOVOLOG) 100 UNIT/ML (3 ML) INPN    2 Units by SubCUTAneous route. 2 units with breakfast and lunch. 4 units with larger dinner. INSULIN GLARGINE (LANTUS SOLOSTAR U-100 INSULIN) 100 UNIT/ML (3 ML) INPN    40 Units by SubCUTAneous route. LIRAGLUTIDE (VICTOZA 2-ALEX) 0.6 MG/0.1 ML (18 MG/3 ML) PNIJ    See Instructions, start with 0.6 mg daily and increase gradually to1.8 mg in 2 weeks SQ daily, # 15 mL, 2 Refills, Pharmacy: [facility]/pharmacy #[id number]   MEDROXYPROGESTERONE (PROVERA) 10 MG TABLET    TAKE 1 TABLET BY MOUTH EVERY DAY FOR 10 DAYS    METFORMIN (GLUCOPHAGE) 500 MG TABLET    Take 500 mg by mouth two (2) times daily (with meals). REVIEW OF SYSTEMS   Review of Systems   Constitutional:  Negative for chills and fever. HENT:  Positive for congestion. Negative for sore throat. Eyes:  Negative for pain and visual disturbance. Respiratory:  Positive for cough, shortness of breath and wheezing. Cardiovascular:  Negative for chest pain and palpitations. Gastrointestinal:  Negative for constipation, diarrhea, nausea and vomiting. Genitourinary:  Negative for dysuria and frequency. Musculoskeletal:  Negative for arthralgias and myalgias. Skin:  Negative for color change and rash. Neurological:  Negative for dizziness, weakness, light-headedness and headaches. Positives and Pertinent negatives as per HPI.     PHYSICAL EXAM     ED Triage Vitals   ED Encounter Vitals Group      BP 02/06/23 1855 (!) 166/96      Pulse (Heart Rate) 02/06/23 1855 (!) 118      Resp Rate 02/06/23 1855 30      Temp 02/06/23 2012 100.4 °F (38 °C)      Temp src --       O2 Sat (%) 02/06/23 1855 93 %      Weight 02/06/23 1855 (!) 357 lb      Height 02/06/23 1855 5' 5\"      Physical Exam  Constitutional:       Appearance: Normal appearance. HENT:      Head: Normocephalic and atraumatic. Right Ear: External ear normal.      Left Ear: External ear normal.      Nose: Nose normal.      Mouth/Throat:      Mouth: Mucous membranes are moist.   Eyes:      Extraocular Movements: Extraocular movements intact. Conjunctiva/sclera: Conjunctivae normal.   Cardiovascular:      Rate and Rhythm: Normal rate and regular rhythm. Pulses: Normal pulses. Heart sounds: Normal heart sounds. Pulmonary:      Effort: Tachypnea present. Breath sounds: Examination of the right-upper field reveals wheezing. Examination of the left-upper field reveals wheezing. Examination of the right-middle field reveals wheezing. Examination of the left-middle field reveals wheezing. Examination of the right-lower field reveals wheezing. Examination of the left-lower field reveals wheezing. Wheezing present. Abdominal:      General: Abdomen is flat. There is no distension. Palpations: Abdomen is soft. Tenderness: There is no abdominal tenderness. Musculoskeletal:         General: Normal range of motion. Cervical back: Normal range of motion. Skin:     General: Skin is warm and dry. Capillary Refill: Capillary refill takes less than 2 seconds. Neurological:      General: No focal deficit present. Mental Status: She is alert and oriented to person, place, and time. Mental status is at baseline.    Psychiatric:         Mood and Affect: Mood normal.         Behavior: Behavior normal.       SCREENINGS               No data recorded        LAB, EKG AND DIAGNOSTIC RESULTS   Labs:  Recent Results (from the past 12 hour(s))   CBC WITH AUTOMATED DIFF    Collection Time: 02/06/23  7:30 PM   Result Value Ref Range    WBC 6.8 3.6 - 11.0 K/uL    RBC 4.57 3.80 - 5.20 M/uL    HGB 13.2 11.5 - 16.0 g/dL    HCT 40.6 35.0 - 47.0 %    MCV 88.8 80.0 - 99.0 FL    MCH 28.9 26.0 - 34.0 PG    MCHC 32.5 30.0 - 36.5 g/dL    RDW 14.4 11.5 - 14.5 %    PLATELET 698 811 - 621 K/uL    MPV 9.8 8.9 - 12.9 FL    NEUTROPHILS 36 32 - 75 %    LYMPHOCYTES 52 (H) 12 - 49 %    MONOCYTES 11 5 - 13 %    EOSINOPHILS 1 0 - 7 %    BASOPHILS 0 0 - 1 %    IMMATURE GRANULOCYTES 0 0.0 - 0.5 %    ABS. NEUTROPHILS 2.5 1.8 - 8.0 K/UL    ABS. LYMPHOCYTES 3.5 0.8 - 3.5 K/UL    ABS. MONOCYTES 0.8 0.0 - 1.0 K/UL    ABS. EOSINOPHILS 0.1 0.0 - 0.4 K/UL    ABS. BASOPHILS 0.0 0.0 - 0.1 K/UL    ABS. IMM. GRANS. 0.0 0.00 - 0.04 K/UL    DF AUTOMATED     METABOLIC PANEL, COMPREHENSIVE    Collection Time: 02/06/23  7:30 PM   Result Value Ref Range    Sodium 136 136 - 145 mmol/L    Potassium 4.2 3.5 - 5.1 mmol/L    Chloride 98 97 - 108 mmol/L    CO2 28 21 - 32 mmol/L    Anion gap 10 5 - 15 mmol/L    Glucose 235 (H) 65 - 100 mg/dL    BUN 6 6 - 20 mg/dL    Creatinine 0.82 0.55 - 1.02 mg/dL    BUN/Creatinine ratio 7 (L) 12 - 20      eGFR >60 >60 ml/min/1.73m2    Calcium 9.6 8.5 - 10.1 mg/dL    Bilirubin, total 0.2 0.2 - 1.0 mg/dL    AST (SGOT) 28 15 - 37 U/L    ALT (SGPT) 18 12 - 78 U/L    Alk.  phosphatase 80 45 - 117 U/L    Protein, total 8.5 (H) 6.4 - 8.2 g/dL    Albumin 3.1 (L) 3.5 - 5.0 g/dL    Globulin 5.4 (H) 2.0 - 4.0 g/dL    A-G Ratio 0.6 (L) 1.1 - 2.2     INFLUENZA A & B AG (RAPID TEST)    Collection Time: 02/06/23  7:33 PM   Result Value Ref Range    Influenza A Antigen Negative Negative      Influenza B Antigen Negative Negative     LACTIC ACID    Collection Time: 02/06/23 11:26 PM   Result Value Ref Range    Lactic acid 3.4 (HH) 0.4 - 2.0 mmol/L   GLUCOSE, POC    Collection Time: 02/06/23 11:43 PM   Result Value Ref Range    Glucose (POC) 316 (H) 65 - 100 mg/dL    Performed by [facility]          Radiologic Studies:  Non-plain film images such as CT, Ultrasound and MRI are read by the radiologist. Plain radiographic images are visualized and preliminarily interpreted by the ED Provider with the below findings:      Interpretation per the Radiologist below, if available at the time of this note:  CTA CHEST W OR W WO CONT    Result Date: 2/6/2023  EXAM:  CTA CHEST W OR W WO CONT INDICATION: Shortness of breath. Tachycardia. COMPARISON: None. TECHNIQUE: Helical thin section chest CT following uneventful intravenous administration of nonionic contrast 100 mL of isovue 370 according to departmental PE protocol. Coronal and sagittal reformats were performed. 3D post processing was performed. CT dose reduction was achieved through the use of a standardized protocol tailored for this examination and automatic exposure control for dose modulation. FINDINGS: This is an adequate quality study for the evaluation of pulmonary embolism to the segmental arterial level. There is no pulmonary embolism to this level. Main pulmonary artery measures 3.5 cm in diameter. THYROID: No nodule. MEDIASTINUM: There is a single enlarged superior mediastinal (prevascular) lymph node, measuring 2.0 cm in short axis diameter. No other pathologically enlarged mediastinal lymph nodes are evident. BETHANY: No mass or lymphadenopathy. THORACIC AORTA: No aneurysm. HEART: Normal in size. Coronary artery calcifications are absent. ESOPHAGUS: No wall thickening or dilatation. TRACHEA/BRONCHI: Patent. PLEURA: No effusion or pneumothorax. LUNGS: Mild nodular airspace disease is present in the right lower lobe and lingular segment of the left upper lobe. UPPER ABDOMEN: Probable small hepatic cysts do not require imaging follow-up. BONES: No aggressive bone lesion or fracture. No evidence of acute pulmonary embolus. Airspace disease in the right lower lobe and left upper lobe suggests pneumonia. Indeterminate enlarged superior mediastinal lymph node; CT follow-up is recommended. Enlarged main pulmonary artery suggests pulmonary hypertension. XR CHEST PORT    Result Date: 2/6/2023  INDICATION:  sob Exam: Portable chest 1931. Comparison: None.  Findings: Cardiomediastinal silhouette is within normal limits. Pulmonary vasculature is not engorged. There are no focal parenchymal opacities, effusions, or pneumothorax. 1. No acute disease        PROCEDURES   Unless otherwise noted below, none.   Performed by: Asael Dalton DO   Procedures      CRITICAL CARE TIME   32 minutes excluding separately billable procedures    ED COURSE and DIFFERENTIAL DIAGNOSIS/MDM   Vitals:    Vitals:    02/06/23 1855 02/06/23 1855 02/06/23 2012 02/06/23 2124   BP:  (!) 166/96 (!) 142/103 (!) 148/100   Pulse:  (!) 118 (!) 124 (!) 116   Resp:  30 24 24   Temp:   100.4 °F (38 °C) 98.4 °F (36.9 °C)   SpO2:  93% 95% 95%   Weight: (!) 161.9 kg (357 lb)      Height: 5' 5\" (1.651 m)           Patient was given the following medications:  Medications   azithromycin (ZITHROMAX) 500 mg in 0.9% sodium chloride 250 mL ([id number]) (500 mg IntraVENous New Bag 2/6/23 2339)   sodium chloride 0.9 % bolus infusion 1,000 mL (1,000 mL IntraVENous New Bag 2/7/23 0016)   albuterol-ipratropium (DUO-NEB) 2.5 MG-0.5 MG/3 ML (3 mL Nebulization Given 2/6/23 1905)   albuterol-ipratropium (DUO-NEB) 2.5 MG-0.5 MG/3 ML (3 mL Nebulization Given 2/6/23 1914)   methylPREDNISolone (PF) (Solu-MEDROL) injection 125 mg (125 mg IntraVENous Given 2/6/23 1923)   magnesium sulfate 2 g/50 ml IVPB (premix or compounded) (0 g IntraVENous Transfusion Completed 2/6/23 2100)   albuterol CONCENTRATE 2.5mg/0.5 mL neb soln (2.5 mg Nebulization Given 2/6/23 2210)   iopamidoL (ISOVUE-370) 370 mg iodine /mL (76 %) injection 100 mL (100 mL IntraVENous Given 2/6/23 2230)   cefTRIAXone (ROCEPHIN) 1 g in sterile water (preservative free) 10 mL IV syringe (1 g IntraVENous Given 2/6/23 2339)   insulin glargine (LANTUS) injection 50 Units (50 Units SubCUTAneous Given 2/7/23 0016)       CONSULTS: (Who and What was discussed)  None     Chronic Conditions: Hypertension, asthma, diabetes  Social Determinants affecting Dx or Tx: None  Counseling:      Records Reviewed (source and summary of external notes): Nursing notes    CC/HPI Summary, DDx, ED Course, and Reassessment: Presenting to the emergency department for evaluation of cough, shortness of breath, wheeze. Patient reports associated productive cough. Symptoms ongoing for the last 3 to 4 days. On exam, patient is tachypneic. She has bilateral expiratory wheezing. She is tachycardic and mildly hypoxic. Patient given multiple breathing treatments, magnesium, Solu-Medrol with persistent hypoxia and increased work of breathing. Chest x-ray negative. CTA obtained to ensure no PE in the setting of persistent tachycardia and hypoxia which demonstrated multifocal pneumonia. Lactic ordered prior to antibiotic initiation and found to be elevated. Will give 1 L bolus and reevaluate. Patient given dose of Rocephin/azithromycin. Signed out to admitting physician, Dr. Misa Mayo         Disposition Considerations (Tests not done, Shared Decision Making, Pt Expectation of Test or Treatment.):      FINAL IMPRESSION     1. Multifocal pneumonia    2. Mild intermittent asthma with acute exacerbation          DISPOSITION/PLAN   Admitted    Admit Note: Pt is being admitted by Misa Mayo. The results of their tests and reason(s) for their admission have been discussed with pt and/or available family. They convey agreement and understanding for the need to be admitted and for the admission diagnosis. PATIENT REFERRED TO:  Follow-up Information    None           DISCHARGE MEDICATIONS:  Current Discharge Medication List            DISCONTINUED MEDICATIONS:  Current Discharge Medication List          I am the Primary Clinician of Record: Cris Garcia DO (electronically signed)    (Please note that parts of this dictation were completed with voice recognition software. Quite often unanticipated grammatical, syntax, homophones, and other interpretive errors are inadvertently transcribed by the computer software. Please disregards these errors.  Please excuse any errors that have escaped final proofreading.)

## 2023-02-07 NOTE — ED NOTES
SBAR given to Brittney Davis RN. Patient A/O. NAD. Respirations even and unlabored. No further questions.

## 2023-02-07 NOTE — H&P
Hospitalist Admission Note    NAME: Jose Angel Rose   :  1980   MRN:  [id number]     Date/Time:  2023 5:12 PM    Patient PCP: Nicole James MD  ______________________________________________________________________  Given the patient's current clinical presentation, I have a high level of concern for decompensation if discharged from the emergency department. Complex decision making was performed, which includes reviewing the patient's available past medical records, laboratory results, and x-ray films. My assessment of this patient's clinical condition and my plan of care is as follows. Assessment / Plan:  Mild persistent asthma exacerbation:  -Started the patient on albuterol nebulization, antibiotics, steroids.  -Continue to monitor. Community-acquired pneumonia:  -Started the patient on ceftriaxone and azithromycin. Diabetes mellitus type 2:  -Patient takes Lantus 50 units at nighttime.  -Started the patient on Lantus 20 units at nighttime and sliding scale in the hospital.  -Hold metformin    Morbid obesity:  -BMI 82.37.  -Complicates overall care. -Encouraged weight loss. Code Status: Full  Surrogate Decision Maker: None    DVT Prophylaxis: Lovenox  GI Prophylaxis: not indicated  BRIANA: 23  Barriers: Clinical improvement. Baseline: Functionally independent      Subjective:   CHIEF COMPLAINT: SOB, cough    HISTORY OF PRESENT ILLNESS:     She is a 40-year-old lady with past medical history significant for diabetes mellitus type 2, asthma presented to the hospital complaining of shortness of breath, cough with sputum production. Symptoms started 2 days prior to admission. Patient has been experiencing significant shortness of breath, wheezing. Patient used her rescue inhaler but it did not help her. Patient also has been experiencing significant productive cough. Patient presented to the hospital for further evaluation.     In the ER, patient was found to be hypoxic requiring supplemental oxygen. Even after providing multiple breathing treatments, steroids, antibiotics patient continued to remain short of breath requiring supplemental oxygen. Internal medicine was consulted for admission. We were asked to admit for work up and evaluation of the above problems. Past Medical History:   Diagnosis Date    Arthritis     back    Asthma     Chronic pain     back pains/ disc compression    Diabetes ([facility] [address]     Hypertension     Morbid obesity ([facility] [address]         Past Surgical History:   Procedure Laterality Date    HX  SECTION      IR ABLATION VEIN EXT INITIAL      x2 per pt       Social History     Tobacco Use    Smoking status: Never    Smokeless tobacco: Never   Substance Use Topics    Alcohol use: Yes     Comment: socially        Family History   Problem Relation Age of Onset    Diabetes Mother     Heart Disease Mother     Hypertension Mother     Stroke Mother     Cancer Paternal Grandmother      No Known Allergies     Prior to Admission medications    Medication Sig Start Date End Date Taking? Authorizing Provider   atorvastatin (LIPITOR) 10 mg tablet Take 10 mg by mouth daily. 23  Yes Other, MD Mckenna   insulin glargine (LANTUS,BASAGLAR) 100 unit/mL (3 mL) inpn 50 Units by SubCUTAneous route nightly. Yes Provider, Historical   insulin aspart U-100 (NOVOLOG) 100 unit/mL (3 mL) inpn 2 Units by SubCUTAneous route. 2 units with breakfast and lunch. 4 units with larger dinner. Yes Provider, Historical   liraglutide (Victoza 2-Jonn) 0.6 mg/0.1 mL (18 mg/3 mL) pnij See Instructions, start with 0.6 mg daily and increase gradually to1.8 mg in 2 weeks SQ daily, # 15 mL, 2 Refills, Pharmacy: [facility]/pharmacy #[id number]  Yes Provider, Historical   albuterol (PROVENTIL HFA, VENTOLIN HFA, PROAIR HFA) 90 mcg/actuation inhaler Take  by inhalation.    Yes Provider, Historical   medroxyPROGESTERone (PROVERA) 10 mg tablet TAKE 1 TABLET BY MOUTH EVERY DAY FOR 10 DAYS 1/25/23   MD Lexa Joseph 1/35, 28, 1-35 mg-mcg tab TAKE 1 TABLET BY MOUTH EVERY DAY 1/4/23   Deisy Day MD   ergocalciferol (ERGOCALCIFEROL) 1,250 mcg (50,000 unit) capsule Take 50,000 Units by mouth. Once a week. Patient not taking: No sig reported    Provider, Historical   metFORMIN (GLUCOPHAGE) 500 mg tablet Take 500 mg by mouth two (2) times daily (with meals). Patient not taking: No sig reported    Provider, Historical   furosemide (LASIX) 40 mg tablet Take  by mouth daily. Provider, Historical       REVIEW OF SYSTEMS:     Total of 12 systems reviewed as follows:       POSITIVE= underlined text  Negative = text not underlined  General:  fever, chills, sweats, generalized weakness, weight loss/gain,      loss of appetite   Eyes:    blurred vision, eye pain, loss of vision, double vision  ENT:    rhinorrhea, pharyngitis   Respiratory:   cough, sputum production, SOB, SAMUEL, wheezing, pleuritic pain   Cardiology:   chest pain, palpitations, orthopnea, PND, edema, syncope   Gastrointestinal:  abdominal pain , N/V, diarrhea, dysphagia, constipation, bleeding   Genitourinary:  frequency, urgency, dysuria, hematuria, incontinence   Muskuloskeletal :  arthralgia, myalgia, back pain  Hematology:  easy bruising, nose or gum bleeding, lymphadenopathy   Dermatological: rash, ulceration, pruritis, color change / jaundice  Endocrine:   hot flashes or polydipsia   Neurological:  headache, dizziness, confusion, focal weakness, paresthesia,     Speech difficulties, memory loss, gait difficulty  Psychological: Feelings of anxiety, depression, agitation    Objective:   VITALS:    Visit Vitals  /83 (BP 1 Location: Left lower arm, BP Patient Position: At rest)   Pulse 93   Temp 98.2 °F (36.8 °C)   Resp 24   Ht 5' 5\" (1.651 m)   Wt (!) 161.9 kg (357 lb)   SpO2 94%   Breastfeeding No   BMI 59.41 kg/m²       PHYSICAL EXAM:    General:    Alert, cooperative, no distress, appears stated age. HEENT: Atraumatic, anicteric sclerae, pink conjunctivae     No oral ulcers, mucosa moist, throat clear, dentition fair  Neck:  Supple, symmetrical,  thyroid: non tender  Lungs:   Mild wheezing on auscultation  Chest wall:  No tenderness  No Accessory muscle use. Heart:   Regular  rhythm,  No  murmur   No edema  Abdomen:   Soft, non-tender. Not distended. Bowel sounds normal  Extremities: No cyanosis. No clubbing,      Skin turgor normal, Capillary refill normal, Radial dial pulse 2+  Skin:     Not pale. Not Jaundiced  No rashes   Psych:  Good insight. Not depressed. Not anxious or agitated. Neurologic: EOMs intact. No facial asymmetry. No aphasia or slurred speech. Symmetrical strength, Sensation grossly intact. Alert and oriented X 4.     _______________________________________________________________________  Care Plan discussed with:    Comments   Patient y    Family      RN y    Care Manager                    Consultant:      _______________________________________________________________________  Expected  Disposition:   Home with Family y   HH/PT/OT/RN    SNF/LTC    ANNALISA      I spent a total of 75 minutes on the day of the visit. This includes face to face time and non-face to face time preparing to see the patient such as review of tests, obtaining and/or reviewing separately obtained history, documenting clinical information in the electronic or other health record, independently interpreting results and communication results to the patient/family/caregiver, or care coordinator. Signed: Mariam Chan MD    Procedures: see electronic medical records for all procedures/Xrays and details which were not copied into this note but were reviewed prior to creation of Plan.     LAB DATA REVIEWED:    Recent Results (from the past 24 hour(s))   CBC WITH AUTOMATED DIFF    Collection Time: 02/06/23  7:30 PM   Result Value Ref Range    WBC 6.8 3.6 - 11.0 K/uL    RBC 4.57 3.80 - 5.20 M/uL    HGB 13.2 11.5 - 16.0 g/dL    HCT 40.6 35.0 - 47.0 %    MCV 88.8 80.0 - 99.0 FL    MCH 28.9 26.0 - 34.0 PG    MCHC 32.5 30.0 - 36.5 g/dL    RDW 14.4 11.5 - 14.5 %    PLATELET 735 478 - 062 K/uL    MPV 9.8 8.9 - 12.9 FL    NEUTROPHILS 36 32 - 75 %    LYMPHOCYTES 52 (H) 12 - 49 %    MONOCYTES 11 5 - 13 %    EOSINOPHILS 1 0 - 7 %    BASOPHILS 0 0 - 1 %    IMMATURE GRANULOCYTES 0 0.0 - 0.5 %    ABS. NEUTROPHILS 2.5 1.8 - 8.0 K/UL    ABS. LYMPHOCYTES 3.5 0.8 - 3.5 K/UL    ABS. MONOCYTES 0.8 0.0 - 1.0 K/UL    ABS. EOSINOPHILS 0.1 0.0 - 0.4 K/UL    ABS. BASOPHILS 0.0 0.0 - 0.1 K/UL    ABS. IMM. GRANS. 0.0 0.00 - 0.04 K/UL    DF AUTOMATED     METABOLIC PANEL, COMPREHENSIVE    Collection Time: 02/06/23  7:30 PM   Result Value Ref Range    Sodium 136 136 - 145 mmol/L    Potassium 4.2 3.5 - 5.1 mmol/L    Chloride 98 97 - 108 mmol/L    CO2 28 21 - 32 mmol/L    Anion gap 10 5 - 15 mmol/L    Glucose 235 (H) 65 - 100 mg/dL    BUN 6 6 - 20 mg/dL    Creatinine 0.82 0.55 - 1.02 mg/dL    BUN/Creatinine ratio 7 (L) 12 - 20      eGFR >60 >60 ml/min/1.73m2    Calcium 9.6 8.5 - 10.1 mg/dL    Bilirubin, total 0.2 0.2 - 1.0 mg/dL    AST (SGOT) 28 15 - 37 U/L    ALT (SGPT) 18 12 - 78 U/L    Alk.  phosphatase 80 45 - 117 U/L    Protein, total 8.5 (H) 6.4 - 8.2 g/dL    Albumin 3.1 (L) 3.5 - 5.0 g/dL    Globulin 5.4 (H) 2.0 - 4.0 g/dL    A-G Ratio 0.6 (L) 1.1 - 2.2     INFLUENZA A & B AG (RAPID TEST)    Collection Time: 02/06/23  7:33 PM   Result Value Ref Range    Influenza A Antigen Negative Negative      Influenza B Antigen Negative Negative     LACTIC ACID    Collection Time: 02/06/23 11:26 PM   Result Value Ref Range    Lactic acid 3.4 (HH) 0.4 - 2.0 mmol/L   GLUCOSE, POC    Collection Time: 02/06/23 11:43 PM   Result Value Ref Range    Glucose (POC) 316 (H) 65 - 100 mg/dL    Performed by [facility]    LACTIC ACID    Collection Time: 02/07/23  1:59 AM   Result Value Ref Range    Lactic acid 2.9 (HH) 0.4 - 2.0 mmol/L   CBC W/O DIFF    Collection Time: 02/07/23  2:38 PM   Result Value Ref Range    WBC 6.5 3.6 - 11.0 K/uL    RBC 4.48 3.80 - 5.20 M/uL    HGB 12.6 11.5 - 16.0 g/dL    HCT 39.3 35.0 - 47.0 %    MCV 87.7 80.0 - 99.0 FL    MCH 28.1 26.0 - 34.0 PG    MCHC 32.1 30.0 - 36.5 g/dL    RDW 14.4 11.5 - 14.5 %    PLATELET 924 823 - 488 K/uL    MPV 10.0 8.9 - 12.9 FL    NRBC 0.0 0.0  WBC    ABSOLUTE NRBC 0.00 0.00 - 8.52 K/uL   METABOLIC PANEL, COMPREHENSIVE    Collection Time: 02/07/23  2:38 PM   Result Value Ref Range    Sodium 134 (L) 136 - 145 mmol/L    Potassium 4.3 3.5 - 5.1 mmol/L    Chloride 100 97 - 108 mmol/L    CO2 27 21 - 32 mmol/L    Anion gap 7 5 - 15 mmol/L    Glucose 367 (H) 65 - 100 mg/dL    BUN 11 6 - 20 mg/dL    Creatinine 0.86 0.55 - 1.02 mg/dL    BUN/Creatinine ratio 13 12 - 20      eGFR >60 >60 ml/min/1.73m2    Calcium 9.6 8.5 - 10.1 mg/dL    Bilirubin, total 0.2 0.2 - 1.0 mg/dL    AST (SGOT) 14 (L) 15 - 37 U/L    ALT (SGPT) 19 12 - 78 U/L    Alk.  phosphatase 74 45 - 117 U/L    Protein, total 8.2 6.4 - 8.2 g/dL    Albumin 3.0 (L) 3.5 - 5.0 g/dL    Globulin 5.2 (H) 2.0 - 4.0 g/dL    A-G Ratio 0.6 (L) 1.1 - 2.2     GLUCOSE, POC    Collection Time: 02/07/23  4:34 PM   Result Value Ref Range    Glucose (POC) 340 (H) 65 - 100 mg/dL    Performed by Lea Blancas

## 2023-02-08 LAB
ALBUMIN SERPL-MCNC: 2.9 G/DL (ref 3.5–5)
ALBUMIN/GLOB SERPL: 0.5 (ref 1.1–2.2)
ALP SERPL-CCNC: 71 U/L (ref 45–117)
ALT SERPL-CCNC: 18 U/L (ref 12–78)
ANION GAP SERPL CALC-SCNC: 7 MMOL/L (ref 5–15)
AST SERPL W P-5'-P-CCNC: 12 U/L (ref 15–37)
BILIRUB SERPL-MCNC: 0.1 MG/DL (ref 0.2–1)
BUN SERPL-MCNC: 13 MG/DL (ref 6–20)
BUN/CREAT SERPL: 16 (ref 12–20)
CA-I BLD-MCNC: 9.5 MG/DL (ref 8.5–10.1)
CHLORIDE SERPL-SCNC: 101 MMOL/L (ref 97–108)
CO2 SERPL-SCNC: 27 MMOL/L (ref 21–32)
CREAT SERPL-MCNC: 0.82 MG/DL (ref 0.55–1.02)
ERYTHROCYTE [DISTWIDTH] IN BLOOD BY AUTOMATED COUNT: 14.2 % (ref 11.5–14.5)
EST. AVERAGE GLUCOSE BLD GHB EST-MCNC: 220 MG/DL
GLOBULIN SER CALC-MCNC: 5.5 G/DL (ref 2–4)
GLUCOSE BLD STRIP.AUTO-MCNC: 336 MG/DL (ref 65–100)
GLUCOSE BLD STRIP.AUTO-MCNC: 363 MG/DL (ref 65–100)
GLUCOSE BLD STRIP.AUTO-MCNC: 406 MG/DL (ref 65–100)
GLUCOSE BLD STRIP.AUTO-MCNC: 411 MG/DL (ref 65–100)
GLUCOSE SERPL-MCNC: 377 MG/DL (ref 65–100)
HBA1C MFR BLD: 9.3 % (ref 4–5.6)
HCT VFR BLD AUTO: 40.1 % (ref 35–47)
HGB BLD-MCNC: 12.7 G/DL (ref 11.5–16)
MCH RBC QN AUTO: 27.7 PG (ref 26–34)
MCHC RBC AUTO-ENTMCNC: 31.7 G/DL (ref 30–36.5)
MCV RBC AUTO: 87.6 FL (ref 80–99)
NRBC # BLD: 0 K/UL (ref 0–0.01)
NRBC BLD-RTO: 0 PER 100 WBC
PERFORMED BY, TECHID: ABNORMAL
PLATELET # BLD AUTO: 333 K/UL (ref 150–400)
PMV BLD AUTO: 10.4 FL (ref 8.9–12.9)
POTASSIUM SERPL-SCNC: 4.4 MMOL/L (ref 3.5–5.1)
PROT SERPL-MCNC: 8.4 G/DL (ref 6.4–8.2)
RBC # BLD AUTO: 4.58 M/UL (ref 3.8–5.2)
SODIUM SERPL-SCNC: 135 MMOL/L (ref 136–145)
WBC # BLD AUTO: 6.7 K/UL (ref 3.6–11)

## 2023-02-08 PROCEDURE — 74011636637 HC RX REV CODE- 636/637: Performed by: NURSE PRACTITIONER

## 2023-02-08 PROCEDURE — 94640 AIRWAY INHALATION TREATMENT: CPT

## 2023-02-08 PROCEDURE — 36415 COLL VENOUS BLD VENIPUNCTURE: CPT

## 2023-02-08 PROCEDURE — 74011250636 HC RX REV CODE- 250/636: Performed by: STUDENT IN AN ORGANIZED HEALTH CARE EDUCATION/TRAINING PROGRAM

## 2023-02-08 PROCEDURE — 74011250637 HC RX REV CODE- 250/637: Performed by: NURSE PRACTITIONER

## 2023-02-08 PROCEDURE — 82962 GLUCOSE BLOOD TEST: CPT

## 2023-02-08 PROCEDURE — 80053 COMPREHEN METABOLIC PANEL: CPT

## 2023-02-08 PROCEDURE — 65270000029 HC RM PRIVATE

## 2023-02-08 PROCEDURE — 74011000250 HC RX REV CODE- 250

## 2023-02-08 PROCEDURE — 74011636637 HC RX REV CODE- 636/637: Performed by: STUDENT IN AN ORGANIZED HEALTH CARE EDUCATION/TRAINING PROGRAM

## 2023-02-08 PROCEDURE — 74011000250 HC RX REV CODE- 250: Performed by: STUDENT IN AN ORGANIZED HEALTH CARE EDUCATION/TRAINING PROGRAM

## 2023-02-08 PROCEDURE — 85027 COMPLETE CBC AUTOMATED: CPT

## 2023-02-08 RX ORDER — INSULIN LISPRO 100 [IU]/ML
15 INJECTION, SOLUTION INTRAVENOUS; SUBCUTANEOUS
Status: COMPLETED | OUTPATIENT
Start: 2023-02-08 | End: 2023-02-08

## 2023-02-08 RX ORDER — SODIUM CHLORIDE FOR INHALATION 0.9 %
VIAL, NEBULIZER (ML) INHALATION
Status: COMPLETED
Start: 2023-02-08 | End: 2023-02-08

## 2023-02-08 RX ORDER — INSULIN LISPRO 100 [IU]/ML
5 INJECTION, SOLUTION INTRAVENOUS; SUBCUTANEOUS
Status: DISCONTINUED | OUTPATIENT
Start: 2023-02-08 | End: 2023-02-09

## 2023-02-08 RX ORDER — INSULIN LISPRO 100 [IU]/ML
15 INJECTION, SOLUTION INTRAVENOUS; SUBCUTANEOUS ONCE
Status: COMPLETED | OUTPATIENT
Start: 2023-02-08 | End: 2023-02-08

## 2023-02-08 RX ORDER — GUAIFENESIN 100 MG/5ML
100 SOLUTION ORAL
Status: DISCONTINUED | OUTPATIENT
Start: 2023-02-08 | End: 2023-02-14 | Stop reason: HOSPADM

## 2023-02-08 RX ORDER — INSULIN GLARGINE 100 [IU]/ML
50 INJECTION, SOLUTION SUBCUTANEOUS
Status: DISCONTINUED | OUTPATIENT
Start: 2023-02-08 | End: 2023-02-14 | Stop reason: HOSPADM

## 2023-02-08 RX ADMIN — ALBUTEROL SULFATE 2.5 MG: 2.5 SOLUTION RESPIRATORY (INHALATION) at 20:22

## 2023-02-08 RX ADMIN — Medication 3 ML: at 14:02

## 2023-02-08 RX ADMIN — SODIUM CHLORIDE, PRESERVATIVE FREE 10 ML: 5 INJECTION INTRAVENOUS at 15:04

## 2023-02-08 RX ADMIN — ENOXAPARIN SODIUM 40 MG: 100 INJECTION SUBCUTANEOUS at 20:19

## 2023-02-08 RX ADMIN — GUAIFENESIN 100 MG: 100 SOLUTION ORAL at 18:19

## 2023-02-08 RX ADMIN — METHYLPREDNISOLONE SODIUM SUCCINATE 40 MG: 40 INJECTION, POWDER, FOR SOLUTION INTRAMUSCULAR; INTRAVENOUS at 09:04

## 2023-02-08 RX ADMIN — METHYLPREDNISOLONE SODIUM SUCCINATE 40 MG: 40 INJECTION, POWDER, FOR SOLUTION INTRAMUSCULAR; INTRAVENOUS at 15:03

## 2023-02-08 RX ADMIN — ONDANSETRON 4 MG: 2 INJECTION INTRAMUSCULAR; INTRAVENOUS at 21:54

## 2023-02-08 RX ADMIN — INSULIN LISPRO 7 UNITS: 100 INJECTION, SOLUTION INTRAVENOUS; SUBCUTANEOUS at 08:00

## 2023-02-08 RX ADMIN — ALBUTEROL SULFATE 2.5 MG: 2.5 SOLUTION RESPIRATORY (INHALATION) at 14:02

## 2023-02-08 RX ADMIN — SODIUM CHLORIDE, PRESERVATIVE FREE 10 ML: 5 INJECTION INTRAVENOUS at 22:13

## 2023-02-08 RX ADMIN — INSULIN LISPRO 15 UNITS: 100 INJECTION, SOLUTION INTRAVENOUS; SUBCUTANEOUS at 13:02

## 2023-02-08 RX ADMIN — AZITHROMYCIN MONOHYDRATE 500 MG: 500 INJECTION, POWDER, LYOPHILIZED, FOR SOLUTION INTRAVENOUS at 00:00

## 2023-02-08 RX ADMIN — ALBUTEROL SULFATE 2.5 MG: 2.5 SOLUTION RESPIRATORY (INHALATION) at 09:28

## 2023-02-08 RX ADMIN — INSULIN GLARGINE 50 UNITS: 100 INJECTION, SOLUTION SUBCUTANEOUS at 21:55

## 2023-02-08 RX ADMIN — METHYLPREDNISOLONE SODIUM SUCCINATE 40 MG: 40 INJECTION, POWDER, FOR SOLUTION INTRAMUSCULAR; INTRAVENOUS at 21:54

## 2023-02-08 RX ADMIN — SODIUM CHLORIDE, PRESERVATIVE FREE 10 ML: 5 INJECTION INTRAVENOUS at 21:56

## 2023-02-08 RX ADMIN — SODIUM CHLORIDE, PRESERVATIVE FREE 10 ML: 5 INJECTION INTRAVENOUS at 05:36

## 2023-02-08 RX ADMIN — ENOXAPARIN SODIUM 40 MG: 100 INJECTION SUBCUTANEOUS at 09:05

## 2023-02-08 RX ADMIN — CEFTRIAXONE SODIUM 1 G: 1 INJECTION, POWDER, FOR SOLUTION INTRAMUSCULAR; INTRAVENOUS at 15:03

## 2023-02-08 RX ADMIN — INSULIN LISPRO 15 UNITS: 100 INJECTION, SOLUTION INTRAVENOUS; SUBCUTANEOUS at 17:52

## 2023-02-08 NOTE — PROGRESS NOTES
Hospitalist Progress Note         DAVON Hearn, FLORINDAP-C    Daily Progress Note: 2/8/2023    20-year-old lady with past medical history significant for diabetes mellitus type 2, asthma presented to the hospital complaining of shortness of breath, cough with sputum production. Symptoms started 2 days prior to admission. Patient has been experiencing significant shortness of breath, wheezing. Patient used her rescue inhaler but it did not help her. Patient also has been experiencing significant productive cough. Patient presented to the hospital for further evaluation. In the ER, patient was found to be hypoxic requiring supplemental oxygen. Even after providing multiple breathing treatments, steroids, antibiotics patient continued to remain short of breath requiring supplemental oxygen. Internal medicine was consulted for admission. We were asked to admit for work up and evaluation of the above problems. Patient started on Solu-Medrol, IV Rocephin and azithromycin. Subjective:   Subjective   Patient examined alert and oriented sitting on bedside. She reports continued dyspnea on exertion. No acute distress noted. Maintaining sats on room air. Review of Systems:   Review of Systems   Constitutional:  Negative for chills and fever. Respiratory:  Positive for cough, shortness of breath and wheezing. Dyspnea on exertion   Cardiovascular:  Negative for chest pain. Gastrointestinal:  Negative for abdominal pain, nausea and vomiting. Genitourinary:  Negative for dysuria.        Objective:   Objective      Vitals:  Patient Vitals for the past 12 hrs:   Temp Pulse Resp BP SpO2   02/08/23 0929 -- -- -- -- 93 %   02/08/23 0800 -- 86 -- -- --   02/08/23 0755 98.3 °F (36.8 °C) 86 17 (!) 153/106 96 %   02/08/23 0533 97.7 °F (36.5 °C) 94 18 (!) 157/93 93 %   02/08/23 0400 -- 75 -- -- --   02/08/23 0000 -- 98 -- -- --        Physical Exam:  Physical Exam  Vitals and nursing note reviewed. Constitutional:       Appearance: She is obese. Cardiovascular:      Rate and Rhythm: Normal rate. Pulmonary:      Comments: Diminished air entry bilateral bases, coarse rhonchi frontal upper lobes  Abdominal:      General: Bowel sounds are normal.   Skin:     General: Skin is warm and dry. Neurological:      Mental Status: She is alert and oriented to person, place, and time. Lab Results:  Recent Results (from the past 24 hour(s))   CBC W/O DIFF    Collection Time: 02/07/23  2:38 PM   Result Value Ref Range    WBC 6.5 3.6 - 11.0 K/uL    RBC 4.48 3.80 - 5.20 M/uL    HGB 12.6 11.5 - 16.0 g/dL    HCT 39.3 35.0 - 47.0 %    MCV 87.7 80.0 - 99.0 FL    MCH 28.1 26.0 - 34.0 PG    MCHC 32.1 30.0 - 36.5 g/dL    RDW 14.4 11.5 - 14.5 %    PLATELET 936 341 - 168 K/uL    MPV 10.0 8.9 - 12.9 FL    NRBC 0.0 0.0  WBC    ABSOLUTE NRBC 0.00 0.00 - 6.68 K/uL   METABOLIC PANEL, COMPREHENSIVE    Collection Time: 02/07/23  2:38 PM   Result Value Ref Range    Sodium 134 (L) 136 - 145 mmol/L    Potassium 4.3 3.5 - 5.1 mmol/L    Chloride 100 97 - 108 mmol/L    CO2 27 21 - 32 mmol/L    Anion gap 7 5 - 15 mmol/L    Glucose 367 (H) 65 - 100 mg/dL    BUN 11 6 - 20 mg/dL    Creatinine 0.86 0.55 - 1.02 mg/dL    BUN/Creatinine ratio 13 12 - 20      eGFR >60 >60 ml/min/1.73m2    Calcium 9.6 8.5 - 10.1 mg/dL    Bilirubin, total 0.2 0.2 - 1.0 mg/dL    AST (SGOT) 14 (L) 15 - 37 U/L    ALT (SGPT) 19 12 - 78 U/L    Alk.  phosphatase 74 45 - 117 U/L    Protein, total 8.2 6.4 - 8.2 g/dL    Albumin 3.0 (L) 3.5 - 5.0 g/dL    Globulin 5.2 (H) 2.0 - 4.0 g/dL    A-G Ratio 0.6 (L) 1.1 - 2.2     GLUCOSE, POC    Collection Time: 02/07/23  4:34 PM   Result Value Ref Range    Glucose (POC) 340 (H) 65 - 100 mg/dL    Performed by [facility]    GLUCOSE, POC    Collection Time: 02/07/23  9:08 PM   Result Value Ref Range    Glucose (POC) 321 (H) 65 - 100 mg/dL    Performed by Kayla Brooks    [facility] W/O DIFF    Collection Time: 02/08/23 7: 23 AM   Result Value Ref Range    WBC 6.7 3.6 - 11.0 K/uL    RBC 4.58 3.80 - 5.20 M/uL    HGB 12.7 11.5 - 16.0 g/dL    HCT 40.1 35.0 - 47.0 %    MCV 87.6 80.0 - 99.0 FL    MCH 27.7 26.0 - 34.0 PG    MCHC 31.7 30.0 - 36.5 g/dL    RDW 14.2 11.5 - 14.5 %    PLATELET 859 252 - 847 K/uL    MPV 10.4 8.9 - 12.9 FL    NRBC 0.0 0.0  WBC    ABSOLUTE NRBC 0.00 0.00 - 8.32 K/uL   METABOLIC PANEL, COMPREHENSIVE    Collection Time: 02/08/23  7:23 AM   Result Value Ref Range    Sodium 135 (L) 136 - 145 mmol/L    Potassium 4.4 3.5 - 5.1 mmol/L    Chloride 101 97 - 108 mmol/L    CO2 27 21 - 32 mmol/L    Anion gap 7 5 - 15 mmol/L    Glucose 377 (H) 65 - 100 mg/dL    BUN 13 6 - 20 mg/dL    Creatinine 0.82 0.55 - 1.02 mg/dL    BUN/Creatinine ratio 16 12 - 20      eGFR >60 >60 ml/min/1.73m2    Calcium 9.5 8.5 - 10.1 mg/dL    Bilirubin, total 0.1 (L) 0.2 - 1.0 mg/dL    AST (SGOT) 12 (L) 15 - 37 U/L    ALT (SGPT) 18 12 - 78 U/L    Alk.  phosphatase 71 45 - 117 U/L    Protein, total 8.4 (H) 6.4 - 8.2 g/dL    Albumin 2.9 (L) 3.5 - 5.0 g/dL    Globulin 5.5 (H) 2.0 - 4.0 g/dL    A-G Ratio 0.5 (L) 1.1 - 2.2     GLUCOSE, POC    Collection Time: 02/08/23  7:34 AM   Result Value Ref Range    Glucose (POC) 336 (H) 65 - 100 mg/dL    Performed by [facility]       Results       Procedure Component Value Units Date/Time    CULTURE, BLOOD, PAIRED [[id number]] Collected: 02/06/23 2325    Order Status: Sent Specimen: Blood Updated: 02/06/23 2331    INFLUENZA A & B AG (RAPID TEST) [[id number]] Collected: 02/06/23 1933    Order Status: Completed Specimen: Nasal washing Updated: 02/06/23 1953     Influenza A Antigen Negative        Influenza B Antigen Negative       INFLUENZA A & B AG (RAPID TEST) [[id number]] Collected: 02/06/23 1930    Order Status: Canceled Specimen: Nasopharyngeal from Nasal washing              Diagnostic Images:  CT Results  (Last 48 hours)                 02/06/23 2232  CTA CHEST W OR W WO CONT Final result    Impression:  No evidence of acute pulmonary embolus. Airspace disease in the right lower lobe   and left upper lobe suggests pneumonia. Indeterminate enlarged superior   mediastinal lymph node; CT follow-up is recommended. Enlarged main pulmonary   artery suggests pulmonary hypertension. Narrative:  EXAM:  CTA CHEST W OR W WO CONT       INDICATION: Shortness of breath. Tachycardia. COMPARISON: None. TECHNIQUE: Helical thin section chest CT following uneventful intravenous   administration of nonionic contrast 100 mL of isovue 370 according to   departmental PE protocol. Coronal and sagittal reformats were performed. 3D post   processing was performed. CT dose reduction was achieved through the use of a   standardized protocol tailored for this examination and automatic exposure   control for dose modulation. FINDINGS: This is an adequate quality study for the evaluation of pulmonary   embolism to the segmental arterial level. There is no pulmonary embolism to this   level. Main pulmonary artery measures 3.5 cm in diameter. THYROID: No nodule. MEDIASTINUM: There is a single enlarged superior mediastinal (prevascular) lymph   node, measuring 2.0 cm in short axis diameter. No other pathologically enlarged   mediastinal lymph nodes are evident. BETHANY: No mass or lymphadenopathy. THORACIC AORTA: No aneurysm. HEART: Normal in size. Coronary artery calcifications are absent. ESOPHAGUS: No wall thickening or dilatation. TRACHEA/BRONCHI: Patent. PLEURA: No effusion or pneumothorax. LUNGS: Mild nodular airspace disease is present in the right lower lobe and   lingular segment of the left upper lobe. UPPER ABDOMEN: Probable small hepatic cysts do not require imaging follow-up. BONES: No aggressive bone lesion or fracture. CTA CHEST W OR W WO CONT   Final Result   No evidence of acute pulmonary embolus.  Airspace disease in the right lower lobe   and left upper lobe suggests pneumonia. Indeterminate enlarged superior   mediastinal lymph node; CT follow-up is recommended. Enlarged main pulmonary   artery suggests pulmonary hypertension. XR CHEST PORT   Final Result   1.  No acute disease                    Current Medications:    Current Facility-Administered Medications:     insulin glargine (LANTUS) injection 50 Units, 50 Units, SubCUTAneous, QHS, Lamar Pena, NP    sodium chloride (NS) flush 5-40 mL, 5-40 mL, IntraVENous, Q8H, Ajit Fernandez MD, 10 mL at 02/08/23 0536    sodium chloride (NS) flush 5-40 mL, 5-40 mL, IntraVENous, PRN, Ajit Fernandez MD    acetaminophen (TYLENOL) tablet 650 mg, 650 mg, Oral, Q6H PRN **OR** acetaminophen (TYLENOL) suppository 650 mg, 650 mg, Rectal, Q6H PRN, Ajit Fernandez MD    polyethylene glycol (MIRALAX) packet 17 g, 17 g, Oral, DAILY PRN, Ajit Fernandez MD    ondansetron (ZOFRAN ODT) tablet 4 mg, 4 mg, Oral, Q8H PRN **OR** ondansetron (ZOFRAN) injection 4 mg, 4 mg, IntraVENous, Q6H PRN, Ajit Fernandez MD    enoxaparin (LOVENOX) injection 40 mg, 40 mg, SubCUTAneous, Q12H, Ajit Fernandez MD, 40 mg at 02/08/23 0905    cefTRIAXone (ROCEPHIN) 1 g in sterile water (preservative free) 10 mL IV syringe, 1 g, IntraVENous, Q24H, Ajit Fernandez MD, 1 g at 02/07/23 1501    methylPREDNISolone (PF) (SOLU-MEDROL) injection 40 mg, 40 mg, IntraVENous, TID, Ajit Fernandez MD, 40 mg at 02/08/23 0904    glucose chewable tablet 16 g, 4 Tablet, Oral, PRN, Ajit Fernandez MD    glucagon (GLUCAGEN) injection 1 mg, 1 mg, IntraMUSCular, PRN, Ajit Fernandez MD    dextrose 10% infusion 0-250 mL, 0-250 mL, IntraVENous, PRN, Ajit Fernandez MD    insulin lispro (HUMALOG) injection, , SubCUTAneous, TIDAC, Ajit Fernandez MD, 7 Units at 02/08/23 0800    albuterol CONCENTRATE 2.5mg/0.5 mL neb soln, 2.5 mg, Nebulization, Q6HWA RT, Ajit Fernandez MD, 2.5 mg at 02/08/23 0928    azithromycin (ZITHROMAX) 500 mg in 0.9% sodium chloride 250 mL ([id number]), 500 mg, IntraVENous, Q24H, Romina Fernandez MD, Last Rate: 250 mL/hr at 02/08/23 0000, 500 mg at 02/08/23 0000       ASSESSMENT:  Mild persistent asthma exacerbation:  -Continue albuterol nebulization, rocephin and azithromycin, steroids.  -Continue to monitor.  -Currently maintaining sats on room air     Community-acquired pneumonia:  -Started the patient on ceftriaxone and azithromycin. Diabetes mellitus type 2:  -Patient takes Lantus 50 units at nighttime.  -Started the patient on Lantus 20 units at nighttime and sliding scale in the hospital.  -Hold metformin     Morbid obesity:  -BMI 63.61.  -Complicates overall care. -Encouraged weight loss  -Dietary lifestyle modification discussed        Full Code  Dvt Prophylaxis Lovenox  GI Prophylaxis not warranted  Dispo: 24 hrs pending clinical improvement    Above treatment plan reviewed and discussed with patient in detail at bedside, all questions answered. Care Plan discussed with: Patient/Family    Total time spent with patient: 35 minutes.     Lolita Helton NP

## 2023-02-08 NOTE — PROGRESS NOTES
Problem: Discharge Planning  Goal: *Knowledge of medication management  Outcome: Progressing Towards Goal     Problem: Patient Education: Go to Patient Education Activity  Goal: Patient/Family Education  Outcome: Progressing Towards Goal     Problem: Pain  Goal: *Control of Pain  Outcome: Progressing Towards Goal     Problem: Patient Education: Go to Patient Education Activity  Goal: Patient/Family Education  Outcome: Progressing Towards Goal     Problem: Impaired Skin Integrity/Pressure Injury Treatment  Goal: *Improvement of Existing Pressure Injury  Outcome: Progressing Towards Goal     Problem: Diabetes Self-Management  Goal: *Disease process and treatment process  Description: Define diabetes and identify own type of diabetes; list 3 options for treating diabetes. Outcome: Progressing Towards Goal  Goal: *Monitoring blood glucose, interpreting and using results  Description: Identify recommended blood glucose targets  and personal targets.   Outcome: Progressing Towards Goal

## 2023-02-09 ENCOUNTER — APPOINTMENT (OUTPATIENT)
Dept: NON INVASIVE DIAGNOSTICS | Age: 43
DRG: 139 | End: 2023-02-09
Attending: INTERNAL MEDICINE
Payer: COMMERCIAL

## 2023-02-09 LAB
ALBUMIN SERPL-MCNC: 3.1 G/DL (ref 3.5–5)
ALBUMIN/GLOB SERPL: 0.6 (ref 1.1–2.2)
ALP SERPL-CCNC: 74 U/L (ref 45–117)
ALT SERPL-CCNC: 20 U/L (ref 12–78)
ANION GAP SERPL CALC-SCNC: 8 MMOL/L (ref 5–15)
AST SERPL W P-5'-P-CCNC: 16 U/L (ref 15–37)
BILIRUB SERPL-MCNC: <0.1 MG/DL (ref 0.2–1)
BNP SERPL-MCNC: 43 PG/ML
BUN SERPL-MCNC: 16 MG/DL (ref 6–20)
BUN/CREAT SERPL: 17 (ref 12–20)
CA-I BLD-MCNC: 9.9 MG/DL (ref 8.5–10.1)
CHLORIDE SERPL-SCNC: 97 MMOL/L (ref 97–108)
CO2 SERPL-SCNC: 26 MMOL/L (ref 21–32)
CREAT SERPL-MCNC: 0.96 MG/DL (ref 0.55–1.02)
ERYTHROCYTE [DISTWIDTH] IN BLOOD BY AUTOMATED COUNT: 14.5 % (ref 11.5–14.5)
GLOBULIN SER CALC-MCNC: 5.4 G/DL (ref 2–4)
GLUCOSE BLD STRIP.AUTO-MCNC: 308 MG/DL (ref 65–100)
GLUCOSE BLD STRIP.AUTO-MCNC: 360 MG/DL (ref 65–100)
GLUCOSE BLD STRIP.AUTO-MCNC: 391 MG/DL (ref 65–100)
GLUCOSE BLD STRIP.AUTO-MCNC: 443 MG/DL (ref 65–100)
GLUCOSE SERPL-MCNC: 475 MG/DL (ref 65–100)
HCT VFR BLD AUTO: 39.2 % (ref 35–47)
HGB BLD-MCNC: 12.5 G/DL (ref 11.5–16)
LACTATE SERPL-SCNC: 2.6 MMOL/L (ref 0.4–2)
M PNEUMO IGM SER IA-ACNC: NONREACTIVE
MCH RBC QN AUTO: 28.3 PG (ref 26–34)
MCHC RBC AUTO-ENTMCNC: 31.9 G/DL (ref 30–36.5)
MCV RBC AUTO: 88.7 FL (ref 80–99)
NRBC # BLD: 0 K/UL (ref 0–0.01)
NRBC BLD-RTO: 0 PER 100 WBC
PERFORMED BY, TECHID: ABNORMAL
PLATELET # BLD AUTO: 316 K/UL (ref 150–400)
PMV BLD AUTO: 10.6 FL (ref 8.9–12.9)
POTASSIUM SERPL-SCNC: 4.3 MMOL/L (ref 3.5–5.1)
PROT SERPL-MCNC: 8.5 G/DL (ref 6.4–8.2)
RBC # BLD AUTO: 4.42 M/UL (ref 3.8–5.2)
SARS-COV-2 RDRP RESP QL NAA+PROBE: NOT DETECTED
SODIUM SERPL-SCNC: 131 MMOL/L (ref 136–145)
WBC # BLD AUTO: 9.3 K/UL (ref 3.6–11)

## 2023-02-09 PROCEDURE — 85027 COMPLETE CBC AUTOMATED: CPT

## 2023-02-09 PROCEDURE — C8929 TTE W OR WO FOL WCON,DOPPLER: HCPCS

## 2023-02-09 PROCEDURE — 74011000250 HC RX REV CODE- 250: Performed by: STUDENT IN AN ORGANIZED HEALTH CARE EDUCATION/TRAINING PROGRAM

## 2023-02-09 PROCEDURE — 87070 CULTURE OTHR SPECIMN AEROBIC: CPT

## 2023-02-09 PROCEDURE — 65270000029 HC RM PRIVATE

## 2023-02-09 PROCEDURE — 74011000250 HC RX REV CODE- 250: Performed by: NURSE PRACTITIONER

## 2023-02-09 PROCEDURE — 83880 ASSAY OF NATRIURETIC PEPTIDE: CPT

## 2023-02-09 PROCEDURE — 74011250637 HC RX REV CODE- 250/637: Performed by: INTERNAL MEDICINE

## 2023-02-09 PROCEDURE — 87635 SARS-COV-2 COVID-19 AMP PRB: CPT

## 2023-02-09 PROCEDURE — 82962 GLUCOSE BLOOD TEST: CPT

## 2023-02-09 PROCEDURE — 83605 ASSAY OF LACTIC ACID: CPT

## 2023-02-09 PROCEDURE — 87449 NOS EACH ORGANISM AG IA: CPT

## 2023-02-09 PROCEDURE — 74011250636 HC RX REV CODE- 250/636: Performed by: INTERNAL MEDICINE

## 2023-02-09 PROCEDURE — 74011250637 HC RX REV CODE- 250/637: Performed by: NURSE PRACTITIONER

## 2023-02-09 PROCEDURE — 77010033678 HC OXYGEN DAILY

## 2023-02-09 PROCEDURE — 74011636637 HC RX REV CODE- 636/637: Performed by: NURSE PRACTITIONER

## 2023-02-09 PROCEDURE — 74011250636 HC RX REV CODE- 250/636: Performed by: STUDENT IN AN ORGANIZED HEALTH CARE EDUCATION/TRAINING PROGRAM

## 2023-02-09 PROCEDURE — 94640 AIRWAY INHALATION TREATMENT: CPT

## 2023-02-09 PROCEDURE — 86738 MYCOPLASMA ANTIBODY: CPT

## 2023-02-09 PROCEDURE — 74011250636 HC RX REV CODE- 250/636

## 2023-02-09 PROCEDURE — 36415 COLL VENOUS BLD VENIPUNCTURE: CPT

## 2023-02-09 PROCEDURE — 74011000250 HC RX REV CODE- 250: Performed by: INTERNAL MEDICINE

## 2023-02-09 PROCEDURE — 94762 N-INVAS EAR/PLS OXIMTRY CONT: CPT

## 2023-02-09 PROCEDURE — 80053 COMPREHEN METABOLIC PANEL: CPT

## 2023-02-09 RX ORDER — ALBUTEROL SULFATE 2.5 MG/.5ML
2.5 SOLUTION RESPIRATORY (INHALATION)
Status: DISCONTINUED | OUTPATIENT
Start: 2023-02-09 | End: 2023-02-14 | Stop reason: HOSPADM

## 2023-02-09 RX ORDER — MONTELUKAST SODIUM 10 MG/1
10 TABLET ORAL
Status: DISCONTINUED | OUTPATIENT
Start: 2023-02-09 | End: 2023-02-09

## 2023-02-09 RX ORDER — BUDESONIDE 0.5 MG/2ML
500 INHALANT ORAL
Status: DISCONTINUED | OUTPATIENT
Start: 2023-02-09 | End: 2023-02-14 | Stop reason: HOSPADM

## 2023-02-09 RX ORDER — AMLODIPINE BESYLATE 5 MG/1
10 TABLET ORAL DAILY
Status: DISCONTINUED | OUTPATIENT
Start: 2023-02-09 | End: 2023-02-14 | Stop reason: HOSPADM

## 2023-02-09 RX ORDER — MONTELUKAST SODIUM 10 MG/1
10 TABLET ORAL
Status: DISCONTINUED | OUTPATIENT
Start: 2023-02-09 | End: 2023-02-14 | Stop reason: HOSPADM

## 2023-02-09 RX ORDER — BUTALBITAL, ACETAMINOPHEN AND CAFFEINE 50; 325; 40 MG/1; MG/1; MG/1
1 TABLET ORAL
Status: DISCONTINUED | OUTPATIENT
Start: 2023-02-09 | End: 2023-02-12

## 2023-02-09 RX ORDER — GUAIFENESIN 600 MG/1
1200 TABLET, EXTENDED RELEASE ORAL 2 TIMES DAILY
Status: DISCONTINUED | OUTPATIENT
Start: 2023-02-09 | End: 2023-02-14 | Stop reason: HOSPADM

## 2023-02-09 RX ORDER — BENZONATATE 100 MG/1
200 CAPSULE ORAL 3 TIMES DAILY
Status: DISCONTINUED | OUTPATIENT
Start: 2023-02-09 | End: 2023-02-14 | Stop reason: HOSPADM

## 2023-02-09 RX ORDER — INSULIN LISPRO 100 [IU]/ML
INJECTION, SOLUTION INTRAVENOUS; SUBCUTANEOUS
Status: DISCONTINUED | OUTPATIENT
Start: 2023-02-09 | End: 2023-02-14 | Stop reason: HOSPADM

## 2023-02-09 RX ORDER — IPRATROPIUM BROMIDE AND ALBUTEROL SULFATE 2.5; .5 MG/3ML; MG/3ML
3 SOLUTION RESPIRATORY (INHALATION)
Status: DISCONTINUED | OUTPATIENT
Start: 2023-02-09 | End: 2023-02-13

## 2023-02-09 RX ORDER — INSULIN LISPRO 100 [IU]/ML
8 INJECTION, SOLUTION INTRAVENOUS; SUBCUTANEOUS
Status: DISCONTINUED | OUTPATIENT
Start: 2023-02-09 | End: 2023-02-14 | Stop reason: HOSPADM

## 2023-02-09 RX ADMIN — INSULIN LISPRO 6 UNITS: 100 INJECTION, SOLUTION INTRAVENOUS; SUBCUTANEOUS at 21:18

## 2023-02-09 RX ADMIN — METHYLPREDNISOLONE SODIUM SUCCINATE 40 MG: 40 INJECTION, POWDER, FOR SOLUTION INTRAMUSCULAR; INTRAVENOUS at 08:53

## 2023-02-09 RX ADMIN — GUAIFENESIN 1200 MG: 600 TABLET, EXTENDED RELEASE ORAL at 12:01

## 2023-02-09 RX ADMIN — METHYLPREDNISOLONE SODIUM SUCCINATE 40 MG: 40 INJECTION, POWDER, FOR SOLUTION INTRAMUSCULAR; INTRAVENOUS at 12:01

## 2023-02-09 RX ADMIN — PERFLUTREN 1 ML: 6.52 INJECTION, SUSPENSION INTRAVENOUS at 16:24

## 2023-02-09 RX ADMIN — IPRATROPIUM BROMIDE AND ALBUTEROL SULFATE 3 ML: 2.5; .5 SOLUTION RESPIRATORY (INHALATION) at 14:02

## 2023-02-09 RX ADMIN — BENZONATATE 200 MG: 100 CAPSULE ORAL at 15:51

## 2023-02-09 RX ADMIN — GUAIFENESIN 1200 MG: 600 TABLET, EXTENDED RELEASE ORAL at 21:18

## 2023-02-09 RX ADMIN — INSULIN LISPRO 8 UNITS: 100 INJECTION, SOLUTION INTRAVENOUS; SUBCUTANEOUS at 17:08

## 2023-02-09 RX ADMIN — SODIUM CHLORIDE, PRESERVATIVE FREE 10 ML: 5 INJECTION INTRAVENOUS at 06:06

## 2023-02-09 RX ADMIN — AMLODIPINE BESYLATE 10 MG: 5 TABLET ORAL at 08:54

## 2023-02-09 RX ADMIN — METHYLPREDNISOLONE SODIUM SUCCINATE 40 MG: 40 INJECTION, POWDER, FOR SOLUTION INTRAMUSCULAR; INTRAVENOUS at 17:08

## 2023-02-09 RX ADMIN — AZITHROMYCIN MONOHYDRATE 500 MG: 500 INJECTION, POWDER, LYOPHILIZED, FOR SOLUTION INTRAVENOUS at 00:00

## 2023-02-09 RX ADMIN — INSULIN LISPRO 10 UNITS: 100 INJECTION, SOLUTION INTRAVENOUS; SUBCUTANEOUS at 17:09

## 2023-02-09 RX ADMIN — INSULIN LISPRO 7 UNITS: 100 INJECTION, SOLUTION INTRAVENOUS; SUBCUTANEOUS at 12:01

## 2023-02-09 RX ADMIN — ENOXAPARIN SODIUM 40 MG: 100 INJECTION SUBCUTANEOUS at 21:18

## 2023-02-09 RX ADMIN — METHYLPREDNISOLONE SODIUM SUCCINATE 40 MG: 40 INJECTION, POWDER, FOR SOLUTION INTRAMUSCULAR; INTRAVENOUS at 23:11

## 2023-02-09 RX ADMIN — ENOXAPARIN SODIUM 40 MG: 100 INJECTION SUBCUTANEOUS at 08:53

## 2023-02-09 RX ADMIN — AZITHROMYCIN MONOHYDRATE 500 MG: 500 INJECTION, POWDER, LYOPHILIZED, FOR SOLUTION INTRAVENOUS at 23:10

## 2023-02-09 RX ADMIN — BENZONATATE 200 MG: 100 CAPSULE ORAL at 21:18

## 2023-02-09 RX ADMIN — SODIUM CHLORIDE, PRESERVATIVE FREE 10 ML: 5 INJECTION INTRAVENOUS at 13:27

## 2023-02-09 RX ADMIN — INSULIN GLARGINE 50 UNITS: 100 INJECTION, SOLUTION SUBCUTANEOUS at 21:19

## 2023-02-09 RX ADMIN — INSULIN LISPRO 8 UNITS: 100 INJECTION, SOLUTION INTRAVENOUS; SUBCUTANEOUS at 08:53

## 2023-02-09 RX ADMIN — CEFTRIAXONE SODIUM 1 G: 1 INJECTION, POWDER, FOR SOLUTION INTRAMUSCULAR; INTRAVENOUS at 15:49

## 2023-02-09 RX ADMIN — INSULIN LISPRO 8 UNITS: 100 INJECTION, SOLUTION INTRAVENOUS; SUBCUTANEOUS at 12:00

## 2023-02-09 RX ADMIN — MONTELUKAST 10 MG: 10 TABLET, FILM COATED ORAL at 12:01

## 2023-02-09 RX ADMIN — INSULIN LISPRO 10 UNITS: 100 INJECTION, SOLUTION INTRAVENOUS; SUBCUTANEOUS at 08:54

## 2023-02-09 RX ADMIN — GUAIFENESIN 100 MG: 100 SOLUTION ORAL at 01:40

## 2023-02-09 RX ADMIN — IPRATROPIUM BROMIDE AND ALBUTEROL SULFATE 3 ML: 2.5; .5 SOLUTION RESPIRATORY (INHALATION) at 20:32

## 2023-02-09 RX ADMIN — SODIUM CHLORIDE, PRESERVATIVE FREE 10 ML: 5 INJECTION INTRAVENOUS at 21:20

## 2023-02-09 RX ADMIN — BUDESONIDE 500 MCG: 0.5 SUSPENSION RESPIRATORY (INHALATION) at 20:32

## 2023-02-09 RX ADMIN — ALBUTEROL SULFATE 2.5 MG: 2.5 SOLUTION RESPIRATORY (INHALATION) at 09:15

## 2023-02-09 RX ADMIN — BUTALBITAL, ACETAMINOPHEN, AND CAFFEINE 1 TABLET: 325; 50; 40 TABLET ORAL at 05:42

## 2023-02-09 NOTE — CONSULTS
Pulmonology and Critical Care Consult    Subjective:   Consult Note: 2/9/2023 11:29 AM    Chief Complaint:   Chief Complaint   Patient presents with    Shortness of Breath    Cough    Headache        This patient has been seen and evaluated at the request of Jillian Mccray NP. Patient is a 47-year-old morbidly obese -American female with a history of insulin-dependent type 2 diabetes mellitus, mild intermittent asthma, and hypertension who presented to the hospital with increasing shortness of breath and wheezing for the past several days consistent with an acute exacerbation of asthma. Patient states that her asthma is usually very well controlled, she only rarely uses her rescue albuterol inhaler at baseline. She is not normally on a maintenance inhaler and has never followed with a pulmonologist before. This is the first time she has been admitted for breathing issues. Chest x-ray on admission was nonacute, CTA chest demonstrated no evidence of PE but did show a mild infiltrate in the right lower lobe and some mosaicism consistent likely small airways disease. Influenza screen negative, will send off an expanded infectious work-up, but agree with IV Rocephin/azithromycin for now. CBC stable today, sodium level down to 131 from 135, blood sugars all the way up to 435, likely steroid response in the setting of poorly controlled type 2 diabetes mellitus. Her A1c is 9.3, LFTs normal, lactate was 3.4 which is come down slightly to 2.9. A repeat lactate is currently pending. Patient is currently on 2 L nasal cannula, weaned off focal sats greater than 90% on room air. Recommend a walking O2 test prior to discharge. We will start the patient on DuoNeb nebulizers every 6 hours and Pulmicort nebs twice daily, can continue on home Singulair.   Increase Solu-Medrol to 40 mg IV every 6 hours if she still having active expiratory wheezing on exam.  Checking a proBNP level and a TTE to rule out any evidence of heart failure/pulmonary hypertension, but she denies any significant lower extremity swelling or any weight gain recently. She has a history of intermittently smoking marijuana in the past, but has not smoked marijuana in many months, no history of smoking cigarettes or vaping. Strong suspicion for underlying obstructive sleep apnea, we will set her up with an appointment to see me in the office in several weeks for an KRYSTINA evaluation as well as PFTs and asthma management.         Past Medical History:   Diagnosis Date    Arthritis     back    Asthma     Chronic pain     back pains/ disc compression    Diabetes ([address]     Hypertension     Morbid obesity ([facility] [address]      Past Surgical History:   Procedure Laterality Date    HX  SECTION      IR ABLATION VEIN EXT INITIAL      x2 per pt      Family History   Problem Relation Age of Onset    Diabetes Mother     Heart Disease Mother     Hypertension Mother     Stroke Mother     Cancer Paternal Grandmother      Social History     Tobacco Use    Smoking status: Never    Smokeless tobacco: Never   Substance Use Topics    Alcohol use: Yes     Comment: socially      Current Facility-Administered Medications   Medication Dose Route Frequency Provider Last Rate Last Admin    butalbital-acetaminophen-caffeine (FIORICET, ESGIC) -40 mg per tablet 1 Tablet  1 Tablet Oral Q6H PRN Jose Justice MD   1 Tablet at 23 0542    amLODIPine (NORVASC) tablet 10 mg  10 mg Oral DAILY Jackie Jaime NP   10 mg at 23 0854    insulin lispro (HUMALOG) injection 8 Units  8 Units SubCUTAneous TID WITH MEALS Jackie Jaime NP   8 Units at 23 0853    insulin lispro (HUMALOG) injection   SubCUTAneous AC&HS Jackie Jaime NP   10 Units at 23 0854    albuterol-ipratropium (DUO-NEB) 2.5 MG-0.5 MG/3 ML  3 mL Nebulization Q6H RT Jackie Jaime NP        albuterol CONCENTRATE 2.5mg/0.5 mL neb soln  2.5 mg Nebulization Q6H PRN Jackie Jaime NP montelukast (SINGULAIR) tablet 10 mg  10 mg Oral QHS Anjum Posada NP        methylPREDNISolone (PF) (SOLU-MEDROL) injection 40 mg  40 mg IntraVENous Q8H Genaro Jordan DO        budesonide (PULMICORT) 500 mcg/2 ml nebulizer suspension  500 mcg Nebulization BID RT Genaro Jordan DO        insulin glargine (LANTUS) injection 50 Units  50 Units SubCUTAneous QHS Anjum Posada NP   50 Units at 02/08/23 2155    guaiFENesin (ROBITUSSIN) 100 mg/5 mL oral liquid 100 mg  100 mg Oral Q6H PRN Anjum Posada NP   100 mg at 02/09/23 0140    sodium chloride (NS) flush 5-40 mL  5-40 mL IntraVENous Q8H Miya Fernandez MD   10 mL at 02/09/23 0606    sodium chloride (NS) flush 5-40 mL  5-40 mL IntraVENous PRN Miya Fernandez MD        acetaminophen (TYLENOL) tablet 650 mg  650 mg Oral Q6H PRN Ari Vazquez MD        Or    acetaminophen (TYLENOL) suppository 650 mg  650 mg Rectal Q6H PRN Miya Fernandez MD        polyethylene glycol (MIRALAX) packet 17 g  17 g Oral DAILY PRN Miya Fernandez MD        ondansetron (ZOFRAN ODT) tablet 4 mg  4 mg Oral Q8H PRN Ari Vazquez MD        Or    ondansetron [facility]) injection 4 mg  4 mg IntraVENous Q6H PRN Ari Vazquez MD   4 mg at 02/08/23 2154    enoxaparin (LOVENOX) injection 40 mg  40 mg SubCUTAneous Q12H Ari Vazquez MD   40 mg at 02/09/23 0853    cefTRIAXone (ROCEPHIN) 1 g in sterile water (preservative free) 10 mL IV syringe  1 g IntraVENous Q24H Ari Vazquez MD   1 g at 02/08/23 1503    glucose chewable tablet 16 g  4 Tablet Oral PRN Ari Vazquez MD        glucagon (GLUCAGEN) injection 1 mg  1 mg IntraMUSCular PRN Miya Fernandez MD        dextrose 10% infusion 0-250 mL  0-250 mL IntraVENous PRN Miya Fernandez MD        azithromycin (ZITHROMAX) 500 mg in 0.9% sodium chloride 250 mL (Toun1Egc)  500 mg IntraVENous Q24H Ari Vazquez  mL/hr at 02/09/23 0000 500 mg at 02/09/23 0000        Home Meds:  No current facility-administered medications on file prior to encounter. Current Outpatient Medications on File Prior to Encounter   Medication Sig Dispense Refill    atorvastatin (LIPITOR) 10 mg tablet Take 10 mg by mouth daily. insulin glargine (LANTUS,BASAGLAR) 100 unit/mL (3 mL) inpn 50 Units by SubCUTAneous route nightly. insulin aspart U-100 (NOVOLOG) 100 unit/mL (3 mL) inpn 2 Units by SubCUTAneous route. 2 units with breakfast and lunch. 4 units with larger dinner. liraglutide (Victoza 2-Jonn) 0.6 mg/0.1 mL (18 mg/3 mL) pnij See Instructions, start with 0.6 mg daily and increase gradually to1.8 mg in 2 weeks SQ daily, # 15 mL, 2 Refills, Pharmacy: [facility]/pharmacy #[id number]     albuterol (PROVENTIL HFA, VENTOLIN HFA, PROAIR HFA) 90 mcg/actuation inhaler Take  by inhalation. medroxyPROGESTERone (PROVERA) 10 mg tablet TAKE 1 TABLET BY MOUTH EVERY DAY FOR 10 DAYS 10 Tablet 2    Alyacen 1/35, 28, 1-35 mg-mcg tab TAKE 1 TABLET BY MOUTH EVERY DAY 28 Tablet 2    ergocalciferol (ERGOCALCIFEROL) 1,250 mcg (50,000 unit) capsule Take 50,000 Units by mouth. Once a week. (Patient not taking: No sig reported)      metFORMIN (GLUCOPHAGE) 500 mg tablet Take 500 mg by mouth two (2) times daily (with meals). (Patient not taking: No sig reported)      furosemide (LASIX) 40 mg tablet Take  by mouth daily. No Known Allergies    Review of Systems:  A comprehensive review of systems was negative except for that written in the HPI. Objective:     Blood pressure (!) 176/100, pulse 73, temperature 97.9 °F (36.6 °C), resp. rate 22, height 5' 5\" (1.651 m), weight (!) 161.9 kg (357 lb), last menstrual period 2022, SpO2 97 %, not currently breastfeeding. Temp (24hrs), Av °F (36.7 °C), Min:97.4 °F (36.3 °C), Max:98.7 °F (37.1 °C)      Intake and Output:  Current Shift: No intake/output data recorded. Last 3 Shifts: No intake/output data recorded.     Physical Exam:   General appearance: alert, cooperative, no distress, appears stated age, morbidly obese  Head: Normocephalic, without obvious abnormality, atraumatic  Eyes: negative  Neck: supple, symmetrical, trachea midline and no adenopathy  Lungs: Expiratory wheezing bilaterally, no obvious rhonchi, currently on 2 L nasal cannula  Heart: regular rate and rhythm, S1, S2 normal, no murmur, click, rub or gallop  Abdomen: soft, non-tender. Bowel sounds normal. No masses,  no organomegaly  Extremities: extremities normal, atraumatic, no cyanosis or edema  Pulses: 2+ and symmetric  Skin: Skin color, texture, turgor normal. No rashes or lesions  Lymph nodes: Cervical, supraclavicular, and axillary nodes normal.  Neurologic: Grossly normal, alert and oriented x3    Additional comments:None    Lab/Data Review: All lab results for the last 24 hours reviewed.   Recent Results (from the past 24 hour(s))   GLUCOSE, POC    Collection Time: 02/08/23 12:36 PM   Result Value Ref Range    Glucose (POC) 406 (H) 65 - 100 mg/dL    Performed by Dwayne Harper    GLUCOSE, POC    Collection Time: 02/08/23  4:13 PM   Result Value Ref Range    Glucose (POC) 411 (H) 65 - 100 mg/dL    Performed by Gio Klein    GLUCOSE, POC    Collection Time: 02/08/23  8:17 PM   Result Value Ref Range    Glucose (POC) 363 (H) 65 - 100 mg/dL    Performed by PERSON RAYTRINIQUE    CBC W/O DIFF    Collection Time: 02/09/23  6:19 AM   Result Value Ref Range    WBC 9.3 3.6 - 11.0 K/uL    RBC 4.42 3.80 - 5.20 M/uL    HGB 12.5 11.5 - 16.0 g/dL    HCT 39.2 35.0 - 47.0 %    MCV 88.7 80.0 - 99.0 FL    MCH 28.3 26.0 - 34.0 PG    MCHC 31.9 30.0 - 36.5 g/dL    RDW 14.5 11.5 - 14.5 %    PLATELET 530 101 - 690 K/uL    MPV 10.6 8.9 - 12.9 FL    NRBC 0.0 0.0  WBC    ABSOLUTE NRBC 0.00 0.00 - 6.95 K/uL   METABOLIC PANEL, COMPREHENSIVE    Collection Time: 02/09/23  6:19 AM   Result Value Ref Range    Sodium 131 (L) 136 - 145 mmol/L    Potassium 4.3 3.5 - 5.1 mmol/L    Chloride 97 97 - 108 mmol/L    CO2 26 21 - 32 mmol/L    Anion gap 8 5 - 15 mmol/L    Glucose 475 (H) 65 - 100 mg/dL    BUN 16 6 - 20 mg/dL    Creatinine 0.96 0.55 - 1.02 mg/dL    BUN/Creatinine ratio 17 12 - 20      eGFR >60 >60 ml/min/1.73m2    Calcium 9.9 8.5 - 10.1 mg/dL    Bilirubin, total <0.1 (L) 0.2 - 1.0 mg/dL    AST (SGOT) 16 15 - 37 U/L    ALT (SGPT) 20 12 - 78 U/L    Alk. phosphatase 74 45 - 117 U/L    Protein, total 8.5 (H) 6.4 - 8.2 g/dL    Albumin 3.1 (L) 3.5 - 5.0 g/dL    Globulin 5.4 (H) 2.0 - 4.0 g/dL    A-G Ratio 0.6 (L) 1.1 - 2.2     GLUCOSE, POC    Collection Time: 02/09/23  8:29 AM   Result Value Ref Range    Glucose (POC) 360 (H) 65 - 100 mg/dL    Performed by Naseem Arita        Chest X-Ray:   CXR Results  (Last 48 hours)      None              CT imaging:  CT Results  (Last 48 hours)      None            PFTs:   PFT Results  (Last 3 results in the past 10 years)      None              Assessment:     Patient is a 20-year-old morbidly obese -American female with a history of insulin-dependent type 2 diabetes mellitus, mild intermittent asthma, and hypertension who presented to the hospital with increasing shortness of breath and wheezing for the past several days consistent with an acute exacerbation of asthma.     Plan:     1.)  Acute hypoxic respiratory failure  -Secondary to acute exacerbation of asthma in the setting of community-acquired pneumonia, targeted therapies as outlined below  -Currently on 2 L nasal cannula, weaned off/greater than 90% on room air  -Recommend a walking O2 test prior to discharge  -I will see her in the office in 2 to 4 weeks for outpatient PFTs and asthma management  -Plan needs an KRYSTINA evaluation as well, definitely needs significant weight loss    2.)  Acute exacerbation of asthma  -Suspect secondary to community-acquired pneumonia, targeted therapies as outlined below  -Continue antibiotics as below  -Increase Solu-Medrol to 40 mg IV every 6 hours today  -Start DuoNeb nebulizers every 6 hours and Pulmicort nebs twice daily  -I will see her in the office in 2 to 4 weeks for outpatient PFTs and asthma management  -Plan needs an KRYSTINA evaluation as well, definitely needs significant weight loss  -Continue home Singulair    3.)  Community-acquired pneumonia  -Very mild infiltrates in the right lower lobe, despite read on CT scan I do not appreciate much left upper lobe infiltrates, does have mosaicism bilaterally  -Could be impetus for asthma exacerbation, influenza screen negative  -We will send off an expanded infectious work-up including a COVID-19 test  -Agree with IV Rocephin/azithromycin    4.)  Lactic acidosis  -Lactate level 3.4 on admission, had come down to 2.9, repeat level pending  -Suspect secondary to respiratory muscle acidosis from increased work of breathing  -Serum bicarb normal this morning    5.)  Hyperglycemia in the setting of poorly controlled type 2 diabetes mellitus  -Blood sugars up to 475 in the setting of systemic steroid use  -A1c 9.3 indicating poor control at baseline  -Being continued on Lantus 50 units subcutaneously every night, recommend increasing this dose per primary team  -Needs aggressive sliding scale insulin    6.)  Hyponatremia  -Sodium level down to 131, did have some lactic acidosis earlier on hospitalization (repeat lactate pending)  -May be somewhat dehydrated, consider initiation of IV fluids  -Repeat BMP in the morning    7.)  Morbid obesity  -BMI 59.41, due to excess calories  -Significant weight loss recommended  -Needs obstructive sleep apnea evaluation in the outpatient setting      CODE STATUS: Full Code        Disposition and Family: Stable to transfer to floor.     Total time spent with patient: 55 minutes      Treva Shannon DO  [facility] (PAT)  2/9/2023  11:29 AM

## 2023-02-09 NOTE — PROGRESS NOTES
Patient has been up to the bathroom during the night and voiced no complaints of any discomfort until 0542 and Dr. Estiven Casas was called and obtained and order for   Fioricet 1 Q6 PRN for migraines.

## 2023-02-09 NOTE — PROGRESS NOTES
Hospitalist Progress Note         DAVON Paz, AJ    Daily Progress Note: 2/9/2023    40-year-old lady with past medical history significant for diabetes mellitus type 2, asthma presented to the hospital complaining of shortness of breath, cough with sputum production. Symptoms started 2 days prior to admission. Patient has been experiencing significant shortness of breath, wheezing. Patient used her rescue inhaler but it did not help her. Patient also has been experiencing significant productive cough. Patient presented to the hospital for further evaluation. In the ER, patient was found to be hypoxic requiring supplemental oxygen. Even after providing multiple breathing treatments, steroids, antibiotics patient continued to remain short of breath requiring supplemental oxygen. Internal medicine was consulted for admission. We were asked to admit for work up and evaluation of the above problems. Patient started on Solu-Medrol, IV Rocephin and azithromycin. Start singulair. Consult pulmonary. Subjective:   Subjective   Patient examined alert and oriented sitting on bedside. Overnight patient dropped her O2 sats and placed on 2L NC. Review of Systems:   Review of Systems   Constitutional:  Negative for chills and fever. Respiratory:  Positive for cough, shortness of breath and wheezing. Dyspnea on exertion   Cardiovascular:  Negative for chest pain. Gastrointestinal:  Negative for abdominal pain, nausea and vomiting. Genitourinary:  Negative for dysuria. Objective:   Objective      Vitals:  Patient Vitals for the past 12 hrs:   Temp Pulse Resp BP SpO2   02/09/23 0736 97.9 °F (36.6 °C) 73 22 (!) 176/100 97 %   02/09/23 0300 98.7 °F (37.1 °C) 95 20 (!) 153/99 95 %   02/09/23 0000 -- 99 -- -- --          Physical Exam  Vitals and nursing note reviewed. Constitutional:       Appearance: She is obese. Cardiovascular:      Rate and Rhythm: Normal rate. Pulmonary:      Breath sounds: Rhonchi present. Comments: Diminished air entry bilateral bases, coarse rhonchi, 2L NC  Abdominal:      General: Bowel sounds are normal.   Skin:     General: Skin is warm and dry. Neurological:      Mental Status: She is alert and oriented to person, place, and time. Lab Results:  Recent Results (from the past 24 hour(s))   GLUCOSE, POC    Collection Time: 02/08/23 12:36 PM   Result Value Ref Range    Glucose (POC) 406 (H) 65 - 100 mg/dL    Performed by Nel Ryder    GLUCOSE, POC    Collection Time: 02/08/23  4:13 PM   Result Value Ref Range    Glucose (POC) 411 (H) 65 - 100 mg/dL    Performed by Matt Rivas    GLUCOSE, POC    Collection Time: 02/08/23  8:17 PM   Result Value Ref Range    Glucose (POC) 363 (H) 65 - 100 mg/dL    Performed by PERSON RAYTRINIQUE    CBC W/O DIFF    Collection Time: 02/09/23  6:19 AM   Result Value Ref Range    WBC 9.3 3.6 - 11.0 K/uL    RBC 4.42 3.80 - 5.20 M/uL    HGB 12.5 11.5 - 16.0 g/dL    HCT 39.2 35.0 - 47.0 %    MCV 88.7 80.0 - 99.0 FL    MCH 28.3 26.0 - 34.0 PG    MCHC 31.9 30.0 - 36.5 g/dL    RDW 14.5 11.5 - 14.5 %    PLATELET 121 543 - 475 K/uL    MPV 10.6 8.9 - 12.9 FL    NRBC 0.0 0.0  WBC    ABSOLUTE NRBC 0.00 0.00 - 6.26 K/uL   METABOLIC PANEL, COMPREHENSIVE    Collection Time: 02/09/23  6:19 AM   Result Value Ref Range    Sodium 131 (L) 136 - 145 mmol/L    Potassium 4.3 3.5 - 5.1 mmol/L    Chloride 97 97 - 108 mmol/L    CO2 26 21 - 32 mmol/L    Anion gap 8 5 - 15 mmol/L    Glucose 475 (H) 65 - 100 mg/dL    BUN 16 6 - 20 mg/dL    Creatinine 0.96 0.55 - 1.02 mg/dL    BUN/Creatinine ratio 17 12 - 20      eGFR >60 >60 ml/min/1.73m2    Calcium 9.9 8.5 - 10.1 mg/dL    Bilirubin, total <0.1 (L) 0.2 - 1.0 mg/dL    AST (SGOT) 16 15 - 37 U/L    ALT (SGPT) 20 12 - 78 U/L    Alk.  phosphatase 74 45 - 117 U/L    Protein, total 8.5 (H) 6.4 - 8.2 g/dL    Albumin 3.1 (L) 3.5 - 5.0 g/dL    Globulin 5.4 (H) 2.0 - 4.0 g/dL    A-G Ratio 0.6 (L) 1.1 - 2.2     GLUCOSE, POC    Collection Time: 02/09/23  8:29 AM   Result Value Ref Range    Glucose (POC) 360 (H) 65 - 100 mg/dL    Performed by Jose Armando Cheema       Results       Procedure Component Value Units Date/Time    COVID-19 RAPID TEST [[id number]]     Order Status: Nicole Navarrete, IGM [[id number]]     Order Status: Sent Specimen: Serum     LEGIONELLA PNEUMOPHILA AG, URINE [[id number]]     Order Status: Sent Specimen: Urine     CULTURE, RESPIRATORY/SPUTUM/BRONCH Westly Dago STAIN [[id number]]     Order Status: Sent Specimen: Sputum     CULTURE, BLOOD, PAIRED [[id number]] Collected: 02/06/23 2328    Order Status: Completed Specimen: Blood Updated: 02/09/23 0723     Special Requests: No Special Requests        Culture result: No growth after 23 hours       INFLUENZA A & B AG (RAPID TEST) [[id number]] Collected: 02/06/23 1933    Order Status: Completed Specimen: Nasal washing Updated: 02/06/23 1953     Influenza A Antigen Negative        Influenza B Antigen Negative       INFLUENZA A & B AG (RAPID TEST) [[id number]] Collected: 02/06/23 1930    Order Status: Canceled Specimen: Nasopharyngeal from Nasal washing              Diagnostic Images:      CTA CHEST W OR W WO CONT   Final Result   No evidence of acute pulmonary embolus. Airspace disease in the right lower lobe   and left upper lobe suggests pneumonia. Indeterminate enlarged superior   mediastinal lymph node; CT follow-up is recommended. Enlarged main pulmonary   artery suggests pulmonary hypertension. XR CHEST PORT   Final Result   1.  No acute disease                    Current Medications:    Current Facility-Administered Medications:     butalbital-acetaminophen-caffeine (FIORICET, ESGIC) -40 mg per tablet 1 Tablet, 1 Tablet, Oral, Q6H PRN, Freddy Ambrocio MD, 1 Tablet at 02/09/23 0542    amLODIPine (NORVASC) tablet 10 mg, 10 mg, Oral, DAILY, Savana Scott NP, 10 mg at 02/09/23 0854    insulin lispro (HUMALOG) injection 8 Units, 8 Units, SubCUTAneous, TID WITH MEALS, Gurvinder Shirley NP, 8 Units at 02/09/23 0853    insulin lispro (HUMALOG) injection, , SubCUTAneous, AC&HS, Gurvinder Shirley NP, 10 Units at 02/09/23 0854    albuterol-ipratropium (DUO-NEB) 2.5 MG-0.5 MG/3 ML, 3 mL, Nebulization, Q6H RT, Gurvinder Shirley NP    albuterol CONCENTRATE 2.5mg/0.5 mL neb soln, 2.5 mg, Nebulization, Q6H PRN, Gurvinder Shirley NP    montelukast (SINGULAIR) tablet 10 mg, 10 mg, Oral, QHS, Lamar Pena NP    budesonide (PULMICORT) 500 mcg/2 ml nebulizer suspension, 500 mcg, Nebulization, BID RT, Genaro Jordan DO    methylPREDNISolone (PF) (SOLU-MEDROL) injection 40 mg, 40 mg, IntraVENous, Q6H, Genaro Jordan DO    guaiFENesin ER (MUCINEX) tablet 1,200 mg, 1,200 mg, Oral, BID, Genaro Jordan DO    benzonatate (TESSALON) capsule 200 mg, 200 mg, Oral, TID, Genaro Jordan DO    insulin glargine (LANTUS) injection 50 Units, 50 Units, SubCUTAneous, QHS, Becky, Henrietta Jones NP, 50 Units at 02/08/23 2155    guaiFENesin (ROBITUSSIN) 100 mg/5 mL oral liquid 100 mg, 100 mg, Oral, Q6H PRN, Gurvinder Shirley NP, 100 mg at 02/09/23 0140    sodium chloride (NS) flush 5-40 mL, 5-40 mL, IntraVENous, Q8H, Rosi Fernandez MD, 10 mL at 02/09/23 0606    sodium chloride (NS) flush 5-40 mL, 5-40 mL, IntraVENous, PRN, Rosi Fernandez MD    acetaminophen (TYLENOL) tablet 650 mg, 650 mg, Oral, Q6H PRN **OR** acetaminophen (TYLENOL) suppository 650 mg, 650 mg, Rectal, Q6H PRN, Rosi Fernandez MD    polyethylene glycol (MIRALAX) packet 17 g, 17 g, Oral, DAILY PRN, Rosi Fernandez MD    ondansetron (ZOFRAN ODT) tablet 4 mg, 4 mg, Oral, Q8H PRN **OR** ondansetron (ZOFRAN) injection 4 mg, 4 mg, IntraVENous, Q6H PRN, Rosi Fernandez MD, 4 mg at 02/08/23 2154    enoxaparin (LOVENOX) injection 40 mg, 40 mg, SubCUTAneous, Q12H, Rosi Fernandez MD, 40 mg at 02/09/23 0853    cefTRIAXone (ROCEPHIN) 1 g in sterile water (preservative free) 10 mL IV syringe, 1 g, IntraVENous, Q24H, Meggan Fernandez MD, 1 g at 02/08/23 1503    glucose chewable tablet 16 g, 4 Tablet, Oral, PRN, Meggan Fernandez MD    glucagon (GLUCAGEN) injection 1 mg, 1 mg, IntraMUSCular, PRN, Meggan Fernandez MD    dextrose 10% infusion 0-250 mL, 0-250 mL, IntraVENous, PRN, Meggan Fernandez MD    azithromycin (ZITHROMAX) 500 mg in 0.9% sodium chloride 250 mL ([id number]), 500 mg, IntraVENous, Q24H, Meggan Fernandez MD, Last Rate: 250 mL/hr at 02/09/23 0000, 500 mg at 02/09/23 0000       ASSESSMENT:  Mild persistent asthma exacerbation:  -Continue albuterol nebulization, rocephin and azithromycin, steroids.  -Continue to monitor.  -Currently on 2 L nasal cannula, wean oxygen as tolerated  -Start singulair  -Pulmonary consulted     Community-acquired pneumonia:  -Started the patient on ceftriaxone and azithromycin. Diabetes mellitus type 2:  -Patient takes Lantus 50 units at nighttime.  -Started the patient on Lantus 20 units at nighttime and sliding scale in the hospital.  -Hold metformin     Morbid obesity:  -BMI 06.81.  -Complicates overall care. -Encouraged weight loss  -Dietary lifestyle modification discussed        Full Code  Dvt Prophylaxis Lovenox  GI Prophylaxis not warranted  Dispo: 24-48 hrs pending clinical improvement    Above treatment plan reviewed and discussed with patient in detail at bedside, all questions answered. Care Plan discussed with: Patient/Family    Total time spent with patient: 35 minutes.     Keeley Workman NP

## 2023-02-10 LAB
ALBUMIN SERPL-MCNC: 2.9 G/DL (ref 3.5–5)
ANION GAP SERPL CALC-SCNC: 6 MMOL/L (ref 5–15)
BUN SERPL-MCNC: 17 MG/DL (ref 6–20)
BUN/CREAT SERPL: 14 (ref 12–20)
CA-I BLD-MCNC: 9.4 MG/DL (ref 8.5–10.1)
CHLORIDE SERPL-SCNC: 97 MMOL/L (ref 97–108)
CO2 SERPL-SCNC: 28 MMOL/L (ref 21–32)
CREAT SERPL-MCNC: 1.24 MG/DL (ref 0.55–1.02)
ECHO AO ASC DIAM: 3.1 CM
ECHO AO ASCENDING AORTA INDEX: 1.23 CM/M2
ECHO AO ROOT DIAM: 2.7 CM
ECHO AO ROOT INDEX: 1.07 CM/M2
ECHO AV AREA PEAK VELOCITY: 2.1 CM2
ECHO AV AREA/BSA PEAK VELOCITY: 0.8 CM2/M2
ECHO AV PEAK GRADIENT: 14 MMHG
ECHO AV PEAK VELOCITY: 1.8 M/S
ECHO AV VELOCITY RATIO: 0.67
ECHO IVC EXP: 2.5 CM
ECHO LA AREA 2C: 16.8 CM2
ECHO LA AREA 4C: 18.8 CM2
ECHO LA DIAMETER INDEX: 1.58 CM/M2
ECHO LA DIAMETER: 4 CM
ECHO LA MAJOR AXIS: 5.5 CM
ECHO LA MINOR AXIS: 5.4 CM
ECHO LA TO AORTIC ROOT RATIO: 1.48
ECHO LA VOL BP: 47 ML (ref 22–52)
ECHO LA VOL/BSA BIPLANE: 19 ML/M2 (ref 16–34)
ECHO LV E' LATERAL VELOCITY: 10 CM/S
ECHO LV E' SEPTAL VELOCITY: 12 CM/S
ECHO LV EDV A2C: 89 ML
ECHO LV EDV A4C: 108 ML
ECHO LV EDV INDEX A4C: 43 ML/M2
ECHO LV EDV NDEX A2C: 35 ML/M2
ECHO LV EJECTION FRACTION A2C: 70 %
ECHO LV EJECTION FRACTION A4C: 73 %
ECHO LV EJECTION FRACTION BIPLANE: 71 % (ref 55–100)
ECHO LV ESV A2C: 27 ML
ECHO LV ESV A4C: 29 ML
ECHO LV ESV INDEX A2C: 11 ML/M2
ECHO LV ESV INDEX A4C: 11 ML/M2
ECHO LV FRACTIONAL SHORTENING: 41 % (ref 28–44)
ECHO LV INTERNAL DIMENSION DIASTOLE INDEX: 1.94 CM/M2
ECHO LV INTERNAL DIMENSION DIASTOLIC: 4.9 CM (ref 3.9–5.3)
ECHO LV INTERNAL DIMENSION SYSTOLIC INDEX: 1.15 CM/M2
ECHO LV INTERNAL DIMENSION SYSTOLIC: 2.9 CM
ECHO LV IVSD: 1.1 CM (ref 0.6–0.9)
ECHO LV MASS 2D: 200.5 G (ref 67–162)
ECHO LV MASS INDEX 2D: 79.2 G/M2 (ref 43–95)
ECHO LV POSTERIOR WALL DIASTOLIC: 1.1 CM (ref 0.6–0.9)
ECHO LV RELATIVE WALL THICKNESS RATIO: 0.45
ECHO LVOT AREA: 3.1 CM2
ECHO LVOT DIAM: 2 CM
ECHO LVOT PEAK GRADIENT: 6 MMHG
ECHO LVOT PEAK VELOCITY: 1.2 M/S
ECHO MV A VELOCITY: 0.9 M/S
ECHO MV E DECELERATION TIME (DT): 191 MS
ECHO MV E VELOCITY: 0.89 M/S
ECHO MV E/A RATIO: 0.99
ECHO MV E/E' LATERAL: 8.9
ECHO MV E/E' RATIO (AVERAGED): 8.16
ECHO MV E/E' SEPTAL: 7.42
ECHO PV MAX VELOCITY: 0.9 M/S
ECHO PV PEAK GRADIENT: 3 MMHG
ECHO RA AREA 4C: 13.7 CM2
ECHO RA END SYSTOLIC VOLUME APICAL 4 CHAMBER INDEX BSA: 13 ML/M2
ECHO RA VOLUME: 32 ML
ECHO RV BASAL DIMENSION: 3.6 CM
ECHO RV TAPSE: 2.1 CM (ref 1.7–?)
ECHO TV REGURGITANT MAX VELOCITY: 2.07 M/S
ECHO TV REGURGITANT PEAK GRADIENT: 17 MMHG
ERYTHROCYTE [DISTWIDTH] IN BLOOD BY AUTOMATED COUNT: 14.4 % (ref 11.5–14.5)
GLUCOSE BLD STRIP.AUTO-MCNC: 320 MG/DL (ref 65–100)
GLUCOSE BLD STRIP.AUTO-MCNC: 383 MG/DL (ref 65–100)
GLUCOSE BLD STRIP.AUTO-MCNC: 389 MG/DL (ref 65–100)
GLUCOSE BLD STRIP.AUTO-MCNC: 458 MG/DL (ref 65–100)
GLUCOSE SERPL-MCNC: 464 MG/DL (ref 65–100)
HCT VFR BLD AUTO: 38 % (ref 35–47)
HGB BLD-MCNC: 12.2 G/DL (ref 11.5–16)
LACTATE SERPL-SCNC: 3.8 MMOL/L (ref 0.4–2)
MAGNESIUM SERPL-MCNC: 2.1 MG/DL (ref 1.6–2.4)
MCH RBC QN AUTO: 28.1 PG (ref 26–34)
MCHC RBC AUTO-ENTMCNC: 32.1 G/DL (ref 30–36.5)
MCV RBC AUTO: 87.6 FL (ref 80–99)
NRBC # BLD: 0 K/UL (ref 0–0.01)
NRBC BLD-RTO: 0 PER 100 WBC
PERFORMED BY, TECHID: ABNORMAL
PHOSPHATE SERPL-MCNC: 2.7 MG/DL (ref 2.6–4.7)
PLATELET # BLD AUTO: 303 K/UL (ref 150–400)
PMV BLD AUTO: 10.2 FL (ref 8.9–12.9)
POTASSIUM SERPL-SCNC: 4.3 MMOL/L (ref 3.5–5.1)
RBC # BLD AUTO: 4.34 M/UL (ref 3.8–5.2)
SODIUM SERPL-SCNC: 131 MMOL/L (ref 136–145)
WBC # BLD AUTO: 8.4 K/UL (ref 3.6–11)

## 2023-02-10 PROCEDURE — 74011250636 HC RX REV CODE- 250/636: Performed by: INTERNAL MEDICINE

## 2023-02-10 PROCEDURE — 85027 COMPLETE CBC AUTOMATED: CPT

## 2023-02-10 PROCEDURE — 74011000250 HC RX REV CODE- 250: Performed by: INTERNAL MEDICINE

## 2023-02-10 PROCEDURE — 74011250637 HC RX REV CODE- 250/637: Performed by: NURSE PRACTITIONER

## 2023-02-10 PROCEDURE — 74011250637 HC RX REV CODE- 250/637: Performed by: INTERNAL MEDICINE

## 2023-02-10 PROCEDURE — 65270000029 HC RM PRIVATE

## 2023-02-10 PROCEDURE — 94640 AIRWAY INHALATION TREATMENT: CPT

## 2023-02-10 PROCEDURE — 82962 GLUCOSE BLOOD TEST: CPT

## 2023-02-10 PROCEDURE — 74011000250 HC RX REV CODE- 250: Performed by: STUDENT IN AN ORGANIZED HEALTH CARE EDUCATION/TRAINING PROGRAM

## 2023-02-10 PROCEDURE — 74011250636 HC RX REV CODE- 250/636: Performed by: NURSE PRACTITIONER

## 2023-02-10 PROCEDURE — 74011636637 HC RX REV CODE- 636/637: Performed by: NURSE PRACTITIONER

## 2023-02-10 PROCEDURE — 74011250636 HC RX REV CODE- 250/636: Performed by: STUDENT IN AN ORGANIZED HEALTH CARE EDUCATION/TRAINING PROGRAM

## 2023-02-10 PROCEDURE — 36415 COLL VENOUS BLD VENIPUNCTURE: CPT

## 2023-02-10 PROCEDURE — 74011000250 HC RX REV CODE- 250: Performed by: NURSE PRACTITIONER

## 2023-02-10 PROCEDURE — 83735 ASSAY OF MAGNESIUM: CPT

## 2023-02-10 PROCEDURE — 83605 ASSAY OF LACTIC ACID: CPT

## 2023-02-10 PROCEDURE — 80069 RENAL FUNCTION PANEL: CPT

## 2023-02-10 RX ORDER — SODIUM CHLORIDE 9 MG/ML
75 INJECTION, SOLUTION INTRAVENOUS CONTINUOUS
Status: DISCONTINUED | OUTPATIENT
Start: 2023-02-10 | End: 2023-02-14 | Stop reason: HOSPADM

## 2023-02-10 RX ADMIN — METHYLPREDNISOLONE SODIUM SUCCINATE 40 MG: 40 INJECTION, POWDER, FOR SOLUTION INTRAMUSCULAR; INTRAVENOUS at 21:44

## 2023-02-10 RX ADMIN — BUDESONIDE 500 MCG: 0.5 SUSPENSION RESPIRATORY (INHALATION) at 20:45

## 2023-02-10 RX ADMIN — INSULIN LISPRO 7 UNITS: 100 INJECTION, SOLUTION INTRAVENOUS; SUBCUTANEOUS at 22:02

## 2023-02-10 RX ADMIN — INSULIN LISPRO 10 UNITS: 100 INJECTION, SOLUTION INTRAVENOUS; SUBCUTANEOUS at 12:51

## 2023-02-10 RX ADMIN — SODIUM CHLORIDE 75 ML/HR: 9 INJECTION, SOLUTION INTRAVENOUS at 08:29

## 2023-02-10 RX ADMIN — BENZONATATE 200 MG: 100 CAPSULE ORAL at 17:39

## 2023-02-10 RX ADMIN — INSULIN LISPRO 8 UNITS: 100 INJECTION, SOLUTION INTRAVENOUS; SUBCUTANEOUS at 17:39

## 2023-02-10 RX ADMIN — INSULIN LISPRO 8 UNITS: 100 INJECTION, SOLUTION INTRAVENOUS; SUBCUTANEOUS at 08:28

## 2023-02-10 RX ADMIN — INSULIN LISPRO 10 UNITS: 100 INJECTION, SOLUTION INTRAVENOUS; SUBCUTANEOUS at 17:39

## 2023-02-10 RX ADMIN — METHYLPREDNISOLONE SODIUM SUCCINATE 40 MG: 40 INJECTION, POWDER, FOR SOLUTION INTRAMUSCULAR; INTRAVENOUS at 14:26

## 2023-02-10 RX ADMIN — GUAIFENESIN 1200 MG: 600 TABLET, EXTENDED RELEASE ORAL at 08:27

## 2023-02-10 RX ADMIN — SODIUM CHLORIDE, PRESERVATIVE FREE 10 ML: 5 INJECTION INTRAVENOUS at 05:34

## 2023-02-10 RX ADMIN — METHYLPREDNISOLONE SODIUM SUCCINATE 40 MG: 40 INJECTION, POWDER, FOR SOLUTION INTRAMUSCULAR; INTRAVENOUS at 05:34

## 2023-02-10 RX ADMIN — INSULIN GLARGINE 50 UNITS: 100 INJECTION, SOLUTION SUBCUTANEOUS at 22:01

## 2023-02-10 RX ADMIN — BENZONATATE 200 MG: 100 CAPSULE ORAL at 21:44

## 2023-02-10 RX ADMIN — MONTELUKAST 10 MG: 10 TABLET, FILM COATED ORAL at 08:28

## 2023-02-10 RX ADMIN — INSULIN LISPRO 7 UNITS: 100 INJECTION, SOLUTION INTRAVENOUS; SUBCUTANEOUS at 08:28

## 2023-02-10 RX ADMIN — AZITHROMYCIN MONOHYDRATE 500 MG: 500 INJECTION, POWDER, LYOPHILIZED, FOR SOLUTION INTRAVENOUS at 23:45

## 2023-02-10 RX ADMIN — BUDESONIDE 500 MCG: 0.5 SUSPENSION RESPIRATORY (INHALATION) at 07:44

## 2023-02-10 RX ADMIN — SODIUM CHLORIDE, PRESERVATIVE FREE 10 ML: 5 INJECTION INTRAVENOUS at 14:33

## 2023-02-10 RX ADMIN — INSULIN LISPRO 8 UNITS: 100 INJECTION, SOLUTION INTRAVENOUS; SUBCUTANEOUS at 12:52

## 2023-02-10 RX ADMIN — CEFTRIAXONE SODIUM 1 G: 1 INJECTION, POWDER, FOR SOLUTION INTRAMUSCULAR; INTRAVENOUS at 14:26

## 2023-02-10 RX ADMIN — IPRATROPIUM BROMIDE AND ALBUTEROL SULFATE 3 ML: 2.5; .5 SOLUTION RESPIRATORY (INHALATION) at 13:17

## 2023-02-10 RX ADMIN — ENOXAPARIN SODIUM 40 MG: 100 INJECTION SUBCUTANEOUS at 21:42

## 2023-02-10 RX ADMIN — AMLODIPINE BESYLATE 10 MG: 5 TABLET ORAL at 08:28

## 2023-02-10 RX ADMIN — SODIUM CHLORIDE, PRESERVATIVE FREE 10 ML: 5 INJECTION INTRAVENOUS at 21:47

## 2023-02-10 RX ADMIN — IPRATROPIUM BROMIDE AND ALBUTEROL SULFATE 3 ML: 2.5; .5 SOLUTION RESPIRATORY (INHALATION) at 20:45

## 2023-02-10 RX ADMIN — IPRATROPIUM BROMIDE AND ALBUTEROL SULFATE 3 ML: 2.5; .5 SOLUTION RESPIRATORY (INHALATION) at 01:29

## 2023-02-10 RX ADMIN — GUAIFENESIN 1200 MG: 600 TABLET, EXTENDED RELEASE ORAL at 21:42

## 2023-02-10 RX ADMIN — IPRATROPIUM BROMIDE AND ALBUTEROL SULFATE 3 ML: 2.5; .5 SOLUTION RESPIRATORY (INHALATION) at 07:44

## 2023-02-10 RX ADMIN — ENOXAPARIN SODIUM 40 MG: 100 INJECTION SUBCUTANEOUS at 08:28

## 2023-02-10 RX ADMIN — BENZONATATE 200 MG: 100 CAPSULE ORAL at 08:27

## 2023-02-10 NOTE — PROGRESS NOTES
Respiratory Therapist informed this Nurse that patient is on a Oxygen Study with continuous pulseox. She stated that she informed patient that she may unhook the pulseox if she needs to go to the bathroom.

## 2023-02-10 NOTE — PROGRESS NOTES
Pulmonology and Critical Care Progress Note    Subjective:     Chief Complaint:   Chief Complaint   Patient presents with    Shortness of Breath    Cough    Headache        Patient seen and examined in her room on the floor this morning, no acute events overnight. Definitely feels better today from a breathing standpoint, but still feels like she needs another 24 hours. Labs are pending today, most recent lactate was still 2.6, repeat pending. COVID-19 testing and mycoplasma screen both negative, blood sugar still in the 300s, primary team managing glycemic control. Come down on Solu-Medrol 40 mg IV every 8 hours, otherwise continue current antibiotics for 7 days and current nebulizer therapies. TTE completed showing an EF of 55 to 70% with a normal RV, low suspicion for pulmonary hypertension. Patient completed an overnight oximetry test last night and desaturated while sleeping less than 90% for 4 hours and 23 minutes, desaturated less than 88% for 29 minutes. We will consult case management to set up nocturnal home O2 prior to setting up an appointment with me in 2 to 4 weeks for KRYSTINA evaluation.               Current Facility-Administered Medications   Medication Dose Route Frequency Provider Last Rate Last Admin    0.9% sodium chloride infusion  75 mL/hr IntraVENous CONTINUOUS Anjum Posada NP 75 mL/hr at 02/10/23 0829 75 mL/hr at 02/10/23 0829    [START ON 2/11/2023] azithromycin (ZITHROMAX) 500 mg in 0.9% sodium chloride 250 mL ([id number])  500 mg IntraVENous Q24H Genaro Jordan DO        methylPREDNISolone (PF) (SOLU-MEDROL) injection 40 mg  40 mg IntraVENous Q8H Genaro Jordan DO        butalbital-acetaminophen-caffeine (FIORICET, ESGIC) -40 mg per tablet 1 Tablet  1 Tablet Oral Q6H PRN Candelaria Weller MD   1 Tablet at 02/09/23 0542    amLODIPine (NORVASC) tablet 10 mg  10 mg Oral DAILY Anjum Posada NP   10 mg at 02/10/23 0828    insulin lispro (HUMALOG) injection 8 Units  8 Units SubCUTAneous TID WITH MEALS Nadia Velez NP   8 Units at 02/10/23 0772    insulin lispro (HUMALOG) injection   SubCUTAneous AC&HS Nadia Velez NP   7 Units at 02/10/23 0828    albuterol-ipratropium (DUO-NEB) 2.5 MG-0.5 MG/3 ML  3 mL Nebulization Q6H RT Nadia Velez, NP   3 mL at 02/10/23 0744    albuterol CONCENTRATE 2.5mg/0.5 mL neb soln  2.5 mg Nebulization Q6H PRN Nadia Velez NP        montelukast (SINGULAIR) tablet 10 mg  10 mg Oral QHS Nadia Velez NP   10 mg at 02/10/23 0828    budesonide (PULMICORT) 500 mcg/2 ml nebulizer suspension  500 mcg Nebulization BID RT Genaro Jordan DO   500 mcg at 02/10/23 0744    guaiFENesin ER (MUCINEX) tablet 1,200 mg  1,200 mg Oral BID Genaro Jordan DO   1,200 mg at 02/10/23 0827    benzonatate (TESSALON) capsule 200 mg  200 mg Oral TID Genaro Jordan DO   200 mg at 02/10/23 0827    insulin glargine (LANTUS) injection 50 Units  50 Units SubCUTAneous QHS Nadia Velez NP   50 Units at 02/09/23 2119    guaiFENesin (ROBITUSSIN) 100 mg/5 mL oral liquid 100 mg  100 mg Oral Q6H PRN Nadia Velez NP   100 mg at 02/09/23 0140    sodium chloride (NS) flush 5-40 mL  5-40 mL IntraVENous Q8H Eder Fernandez MD   10 mL at 02/10/23 0534    sodium chloride (NS) flush 5-40 mL  5-40 mL IntraVENous PRN Yonatan Turcios MD        acetaminophen (TYLENOL) tablet 650 mg  650 mg Oral Q6H PRN Yonatan Turcios MD        Or    acetaminophen (TYLENOL) suppository 650 mg  650 mg Rectal Q6H PRN Eder Fernandez MD        polyethylene glycol (MIRALAX) packet 17 g  17 g Oral DAILY PRN Yonatan Turcios MD        ondansetron (ZOFRAN ODT) tablet 4 mg  4 mg Oral Q8H PRN Yonatan Turcios MD        Or    ondansetron [facility]) injection 4 mg  4 mg IntraVENous Q6H PRN Yonatan Turcios MD   4 mg at 02/08/23 2154    enoxaparin (LOVENOX) injection 40 mg  40 mg SubCUTAneous Q12H Yonatan Turcios MD   40 mg at 02/10/23 0828    cefTRIAXone (ROCEPHIN) 1 g in sterile water (preservative free) 10 mL IV syringe  1 g IntraVENous Q24H Genaro Jordan DO   1 g at 23 1549    glucose chewable tablet 16 g  4 Tablet Oral PRN Herman Puga MD        glucagon (GLUCAGEN) injection 1 mg  1 mg IntraMUSCular PRN Wesley Fernandez MD        dextrose 10% infusion 0-250 mL  0-250 mL IntraVENous PRN Herman Puga MD            No Known Allergies    Review of Systems:  A comprehensive review of systems was negative except for that written in the HPI. Objective:     Blood pressure (!) 155/92, pulse (!) 116, temperature 98 °F (36.7 °C), resp. rate 20, height 5' 5\" (1.651 m), weight (!) 161.9 kg (357 lb), last menstrual period 2022, SpO2 92 %, not currently breastfeeding. Temp (24hrs), Av.2 °F (36.8 °C), Min:98 °F (36.7 °C), Max:98.3 °F (36.8 °C)      Intake and Output:  Current Shift: No intake/output data recorded. Last 3 Shifts: No intake/output data recorded. Physical Exam:   General appearance: alert, cooperative, no distress, appears stated age, morbidly obese  Head: Normocephalic, without obvious abnormality, atraumatic  Eyes: negative  Neck: supple, symmetrical, trachea midline and no adenopathy  Lungs: Expiratory wheezing bilaterally is improved today, no obvious rhonchi, currently on room air  Heart: regular rate and rhythm, S1, S2 normal, no murmur, click, rub or gallop  Abdomen: soft, non-tender. Bowel sounds normal. No masses,  no organomegaly  Extremities: extremities normal, atraumatic, no cyanosis or edema  Pulses: 2+ and symmetric  Skin: Skin color, texture, turgor normal. No rashes or lesions  Lymph nodes: Cervical, supraclavicular, and axillary nodes normal.  Neurologic: Grossly normal, alert and oriented x3    Additional comments:None    Lab/Data Review: All lab results for the last 24 hours reviewed.   Recent Results (from the past 24 hour(s))   MYCOPLASMA AB, IGM    Collection Time: 23 11:22 AM   Result Value Ref Range    Mycoplasma Ab, IgM NONREACTIVE NONREACTIVE     LACTIC ACID    Collection Time: 02/09/23 11:22 AM   Result Value Ref Range    Lactic acid 2.6 (HH) 0.4 - 2.0 mmol/L   NT-PRO BNP    Collection Time: 02/09/23 11:22 AM   Result Value Ref Range    NT pro-BNP 43 <125 pg/mL   GLUCOSE, POC    Collection Time: 02/09/23 11:33 AM   Result Value Ref Range    Glucose (POC) 308 (H) 65 - 100 mg/dL    Performed by Nelson Ramirez    COVID-19 RAPID TEST    Collection Time: 02/09/23 12:44 PM   Result Value Ref Range    COVID-19 rapid test Not Detected Not Detected     ECHO ADULT COMPLETE    Collection Time: 02/09/23  4:24 PM   Result Value Ref Range    LV EDV A2C 89 mL    LV EDV A4C 108 mL    LV ESV A2C 27 mL    LV ESV A4C 29 mL    IVSd 1.1 (A) 0.6 - 0.9 cm    LVIDd 4.9 3.9 - 5.3 cm    LVIDs 2.9 cm    LVOT Diameter 2.0 cm    LVOT Peak Velocity 1.2 m/s    LVOT Peak Gradient 6 mmHg    LVPWd 1.1 (A) 0.6 - 0.9 cm    LV E' Lateral Velocity 10 cm/s    LV E' Septal Velocity 12 cm/s    LV Ejection Fraction A2C 70 %    LV Ejection Fraction A4C 73 %    EF BP 71 55 - 100 %    LVOT Area 3.1 cm2    LA Minor Axis 5.4 cm    LA Major Axis 5.5 cm    LA Area 2C 16.8 cm2    LA Area 4C 18.8 cm2    LA Volume BP 47 22 - 52 mL    LA Diameter 4.0 cm    RA Area 4C 13.7 cm2    RA Volume 32 ml    AV Peak Velocity 1.8 m/s    AV Peak Gradient 14 mmHg    AV Area by Peak Velocity 2.1 cm2    Aortic Root 2.7 cm    Ascending Aorta 3.1 cm    IVC Expiration 2.5 cm    MV E Wave Deceleration Time 191.0 ms    MV A Velocity 0.90 m/s    MV E Velocity 0.89 m/s    PV Max Velocity 0.9 m/s    PV Peak Gradient 3 mmHg    RV Basal Dimension 3.6 cm    TAPSE 2.1 1.7 cm    TR Max Velocity 2.07 m/s    TR Peak Gradient 17 mmHg    Fractional Shortening 2D 41 28 - 44 %    LV ESV Index A4C 11 mL/m2    LV EDV Index A4C 43 mL/m2    LV ESV Index A2C 11 mL/m2    LV EDV Index A2C 35 mL/m2    LVIDd Index 1.94 cm/m2    LVIDs Index 1.15 cm/m2    LV RWT Ratio 0.45     LV Mass 2D 200.5 (A) 67 - 162 g    LV Mass 2D Index 79.2 43 - 95 g/m2    MV E/A 0.99     E/E' Ratio (Averaged) 8.16     E/E' Lateral 8.90     E/E' Septal 7.42     LA Volume Index BP 19 16 - 34 ml/m2    LA Size Index 1.58 cm/m2    LA/AO Root Ratio 1.48     RA Volume Index A4C 13 mL/m2    Ao Root Index 1.07 cm/m2    Ascending Aorta Index 1.23 cm/m2    AV Velocity Ratio 0.67     BRIDGER/BSA Peak Velocity 0.8 cm2/m2   GLUCOSE, POC    Collection Time: 02/09/23  4:34 PM   Result Value Ref Range    Glucose (POC) 443 (H) 65 - 100 mg/dL    Performed by Teri Pham    GLUCOSE, POC    Collection Time: 02/09/23  8:49 PM   Result Value Ref Range    Glucose (POC) 391 (H) 65 - 100 mg/dL    Performed by Charles Dominguez    GLUCOSE, POC    Collection Time: 02/10/23  7:37 AM   Result Value Ref Range    Glucose (POC) 320 (H) 65 - 100 mg/dL    Performed by Duy Meneses        Chest X-Ray:   CXR Results  (Last 48 hours)      None              CT imaging:  CT Results  (Last 48 hours)      None            PFTs:   PFT Results  (Last 3 results in the past 10 years)      None              Assessment:     Patient is a 47-year-old morbidly obese -American female with a history of insulin-dependent type 2 diabetes mellitus, mild intermittent asthma, and hypertension who presented to the hospital with increasing shortness of breath and wheezing for the past several days consistent with an acute exacerbation of asthma. Plan:     1.)  Acute hypoxic respiratory failure  -Secondary to acute exacerbation of asthma in the setting of community-acquired pneumonia, targeted therapies as outlined below  -Currently on room air, wean off for sats greater than 90% on room air  -I will see her in the office in 2 to 4 weeks for outpatient PFTs and asthma management  -Overnight oximetry confirms nocturnal hypoxemia. Case management consultation for supplemental oxygen at nighttime while sleeping, recommend 2 L nasal cannula.   -Plan needs an KRYSTINA evaluation as well, definitely needs significant weight loss    2.) Acute exacerbation of asthma  -Suspect secondary to community-acquired pneumonia, targeted therapies as outlined below  -Continue antibiotics as below  -Decrease Solu-Medrol to 40 mg IV every 8 hours  -Start DuoNeb nebulizers every 6 hours and Pulmicort nebs twice daily  -I will see her in the office in 2 to 4 weeks for outpatient PFTs and asthma management  -Overnight oximetry confirms nocturnal hypoxemia.   Case management consultation for supplemental oxygen at nighttime while sleeping.  -Continue home Singulair    3.)  Community-acquired pneumonia  -Very mild infiltrates in the right lower lobe, despite read on CT scan I do not appreciate much left upper lobe infiltrates, does have mosaicism bilaterally  -Could be impetus for asthma exacerbation, influenza screen negative  -Expanded infectious work-up negative  -Agree with IV Rocephin/azithromycin x7 days    4.)  Lactic acidosis  -Lactate level 3.4 on admission, had come down to 2.6  -Repeat level pending this morning, currently on normal saline at 75 cc/h  -Suspect secondary to respiratory muscle acidosis from increased work of breathing  -Serum bicarb normal yesterday morning    5.)  Hyperglycemia in the setting of poorly controlled type 2 diabetes mellitus  -Blood sugars up to 475 in the setting of systemic steroid use  -A1c 9.3 indicating poor control at baseline  -Being continued on Lantus 50 units subcutaneously every night, recommend increasing this dose per primary team  -Needs aggressive sliding scale insulin  -Coming down slightly on steroids today    6.)  Hyponatremia  -Sodium level down to 131, did have some lactic acidosis earlier on hospitalization (repeat lactate pending)  -Repeat labs pending today  -Started on normal saline at 75 cc/h by primary team  -Repeat BMP in the morning    7.)  Morbid obesity  -BMI 59.41, due to excess calories  -Significant weight loss recommended  -Needs obstructive sleep apnea evaluation in the outpatient setting      CODE STATUS: Full Code        Disposition and Family: Stable to transfer to floor.     Total time spent with patient: 30 minutes      Bre Guardian,   [facility] (PAT)  2/10/2023

## 2023-02-10 NOTE — PROGRESS NOTES
DC Plan: [facility] for nocturnal oxygen    CM met with pt at the bedside to f/up on her dc plan. Pt will need nocturnal oxygen. CM discussed ordering nocturnal oxygen. Cm received choice for [facility]. Referral made via [employer]. Cm informed [facility] of anticipated discharge for tomorrow. Cm asked them if they can deilver the nocturnal oxygen concentrator to the hospital.     Cm [facility] [employer] [facility] indicating [facility] usually deliver the concentrator to the pt's home. [facility] would like Cm to call their on call provider this weekend to coordinate delivery. Cm faxed back signed CMN. Cm met with pt at the bedside and gave her an update regarding her nocturnal oxygen. Cm informed pt the weekend Cm will call [facility] to coordinate delivery to her home.

## 2023-02-10 NOTE — PROGRESS NOTES
2055 Dr. Augustina Hansen called and notified that patient's blood sugar = 391. Dr. Augustina Hansen stated to give Humalog 6u SQ.

## 2023-02-10 NOTE — PROGRESS NOTES
Hospitalist Progress Note         CorbyDAVON Cox, FNP-C    Daily Progress Note: 2/10/2023    49-year-old lady with past medical history significant for diabetes mellitus type 2, asthma presented to the hospital complaining of shortness of breath, cough with sputum production. Symptoms started 2 days prior to admission. Patient has been experiencing significant shortness of breath, wheezing. Patient used her rescue inhaler but it did not help her. Patient also has been experiencing significant productive cough. Patient presented to the hospital for further evaluation. In the ER, patient was found to be hypoxic requiring supplemental oxygen. Even after providing multiple breathing treatments, steroids, antibiotics patient continued to remain short of breath requiring supplemental oxygen. Internal medicine was consulted for admission. We were asked to admit for work up and evaluation of the above problems. Patient started on Solu-Medrol, IV Rocephin and azithromycin. Start singulair. Consult pulmonary. Pulmonary proximity shows that patient O2 sats was less than 90% over 4 hours. Patient O2 sats was less than 88% for 29.3 minutes. Subjective:   Subjective   Patient examined alert and oriented sitting on bedside. Patient currently maintaining sats on room air. No acute distress noted on examination. Will require home O2 arrangement for nocturnal oxygen. Review of Systems:   Review of Systems   Constitutional:  Negative for chills and fever. Respiratory:  Positive for cough, shortness of breath and wheezing. Dyspnea on exertion   Cardiovascular:  Negative for chest pain. Gastrointestinal:  Negative for abdominal pain, nausea and vomiting. Genitourinary:  Negative for dysuria.        Objective:   Objective      Vitals:  Patient Vitals for the past 12 hrs:   Temp Pulse Resp BP SpO2   02/10/23 0844 -- (!) 116 -- -- --   02/10/23 0823 98 °F (36.7 °C) 95 20 (!) 155/92 92 % 02/10/23 0415 98.1 °F (36.7 °C) 85 18 (!) 156/63 92 %   02/10/23 0130 -- -- -- -- 91 %          Physical Exam  Vitals and nursing note reviewed. Constitutional:       Appearance: She is obese. Cardiovascular:      Rate and Rhythm: Tachycardia present. Pulmonary:      Breath sounds: Wheezing present. Comments: Expiratory wheezes, room air  Abdominal:      General: Bowel sounds are normal.   Skin:     General: Skin is warm and dry. Neurological:      Mental Status: She is alert and oriented to person, place, and time.         Lab Results:  Recent Results (from the past 24 hour(s))   MYCOPLASMA AB, IGM    Collection Time: 02/09/23 11:22 AM   Result Value Ref Range    Mycoplasma Ab, IgM NONREACTIVE NONREACTIVE     LACTIC ACID    Collection Time: 02/09/23 11:22 AM   Result Value Ref Range    Lactic acid 2.6 (HH) 0.4 - 2.0 mmol/L   NT-PRO BNP    Collection Time: 02/09/23 11:22 AM   Result Value Ref Range    NT pro-BNP 43 <125 pg/mL   GLUCOSE, POC    Collection Time: 02/09/23 11:33 AM   Result Value Ref Range    Glucose (POC) 308 (H) 65 - 100 mg/dL    Performed by Vickii Nose    COVID-19 RAPID TEST    Collection Time: 02/09/23 12:44 PM   Result Value Ref Range    COVID-19 rapid test Not Detected Not Detected     ECHO ADULT COMPLETE    Collection Time: 02/09/23  4:24 PM   Result Value Ref Range    LV EDV A2C 89 mL    LV EDV A4C 108 mL    LV ESV A2C 27 mL    LV ESV A4C 29 mL    IVSd 1.1 (A) 0.6 - 0.9 cm    LVIDd 4.9 3.9 - 5.3 cm    LVIDs 2.9 cm    LVOT Diameter 2.0 cm    LVOT Peak Velocity 1.2 m/s    LVOT Peak Gradient 6 mmHg    LVPWd 1.1 (A) 0.6 - 0.9 cm    LV E' Lateral Velocity 10 cm/s    LV E' Septal Velocity 12 cm/s    LV Ejection Fraction A2C 70 %    LV Ejection Fraction A4C 73 %    EF BP 71 55 - 100 %    LVOT Area 3.1 cm2    LA Minor Axis 5.4 cm    LA Major Axis 5.5 cm    LA Area 2C 16.8 cm2    LA Area 4C 18.8 cm2    LA Volume BP 47 22 - 52 mL    LA Diameter 4.0 cm    RA Area 4C 13.7 cm2    RA Volume 32 ml    AV Peak Velocity 1.8 m/s    AV Peak Gradient 14 mmHg    AV Area by Peak Velocity 2.1 cm2    Aortic Root 2.7 cm    Ascending Aorta 3.1 cm    IVC Expiration 2.5 cm    MV E Wave Deceleration Time 191.0 ms    MV A Velocity 0.90 m/s    MV E Velocity 0.89 m/s    PV Max Velocity 0.9 m/s    PV Peak Gradient 3 mmHg    RV Basal Dimension 3.6 cm    TAPSE 2.1 1.7 cm    TR Max Velocity 2.07 m/s    TR Peak Gradient 17 mmHg    Fractional Shortening 2D 41 28 - 44 %    LV ESV Index A4C 11 mL/m2    LV EDV Index A4C 43 mL/m2    LV ESV Index A2C 11 mL/m2    LV EDV Index A2C 35 mL/m2    LVIDd Index 1.94 cm/m2    LVIDs Index 1.15 cm/m2    LV RWT Ratio 0.45     LV Mass 2D 200.5 (A) 67 - 162 g    LV Mass 2D Index 79.2 43 - 95 g/m2    MV E/A 0.99     E/E' Ratio (Averaged) 8.16     E/E' Lateral 8.90     E/E' Septal 7.42     LA Volume Index BP 19 16 - 34 ml/m2    LA Size Index 1.58 cm/m2    LA/AO Root Ratio 1.48     RA Volume Index A4C 13 mL/m2    Ao Root Index 1.07 cm/m2    Ascending Aorta Index 1.23 cm/m2    AV Velocity Ratio 0.67     BRIDGER/BSA Peak Velocity 0.8 cm2/m2   GLUCOSE, POC    Collection Time: 02/09/23  4:34 PM   Result Value Ref Range    Glucose (POC) 443 (H) 65 - 100 mg/dL    Performed by Sha Rodriguez    GLUCOSE, POC    Collection Time: 02/09/23  8:49 PM   Result Value Ref Range    Glucose (POC) 391 (H) 65 - 100 mg/dL    Performed by Yael Lagos    GLUCOSE, POC    Collection Time: 02/10/23  7:37 AM   Result Value Ref Range    Glucose (POC) 320 (H) 65 - 100 mg/dL    Performed by Chelo Mars       Results       Procedure Component Value Units Date/Time    LEGIONELLA PNEUMOPHILA AG, URINE [[id number]] Collected: 02/09/23 1454    Order Status: Sent Specimen: Urine Updated: 02/09/23 1506    CULTURE, RESPIRATORY/SPUTUM/BRONCH [place] STAIN [[id number]] Collected: 02/09/23 1455    Order Status: Sent Specimen: Sputum Updated: 02/09/23 1505    COVID-19 RAPID TEST [[id number]] Collected: 02/09/23 1244    Order Status: Completed Specimen: Nasopharyngeal Updated: 02/09/23 1322     COVID-19 rapid test Not Detected        Comment: Rapid [employer] ID Now   The specimen is NEGATIVE for SARS-CoV2, the novel coronavirus associated with COVID-19. A negative result does not rule out COVID-19. This test has been authorized by the FDA under an Emergency Use Authorization (EUA) for use by authorized laboratories. Fact sheet for Healthcare Providers:  http://www.cely.melo/ Fact sheet for Patients: http://www.cely.melo/   Methodology: Isothermal Nucleic Acid Amplification       MYCOPLASMA AB, IGM [[id number]] Collected: 02/09/23 1122    Order Status: Completed Specimen: Serum Updated: 02/09/23 2325     Mycoplasma Ab, IgM NONREACTIVE       CULTURE, BLOOD, PAIRED [[id number]] Collected: 02/06/23 2328    Order Status: Completed Specimen: Blood Updated: 02/10/23 0654     Special Requests: No Special Requests        Culture result: No growth 2 days       INFLUENZA A & B AG (RAPID TEST) [[id number]] Collected: 02/06/23 1933    Order Status: Completed Specimen: Nasal washing Updated: 02/06/23 1953     Influenza A Antigen Negative        Influenza B Antigen Negative       INFLUENZA A & B AG (RAPID TEST) [[id number]] Collected: 02/06/23 1930    Order Status: Canceled Specimen: Nasopharyngeal from Nasal washing              Diagnostic Images:      CTA CHEST W OR W WO CONT   Final Result   No evidence of acute pulmonary embolus. Airspace disease in the right lower lobe   and left upper lobe suggests pneumonia. Indeterminate enlarged superior   mediastinal lymph node; CT follow-up is recommended. Enlarged main pulmonary   artery suggests pulmonary hypertension. XR CHEST PORT   Final Result   1.  No acute disease                    Current Medications:    Current Facility-Administered Medications:     0.9% sodium chloride infusion, 75 mL/hr, IntraVENous, CONTINUOUS, Curtistine Moder, NP, Last Rate: 75 mL/hr at 02/10/23 0829, 75 mL/hr at 02/10/23 0829    [START ON 2/11/2023] azithromycin (ZITHROMAX) 500 mg in 0.9% sodium chloride 250 mL ([id number]), 500 mg, IntraVENous, Q24H, Genaro Jordan,     methylPREDNISolone (PF) (SOLU-MEDROL) injection 40 mg, 40 mg, IntraVENous, Q8H, Genaro Jordan, DO    butalbital-acetaminophen-caffeine (FIORICET, ESGIC) -40 mg per tablet 1 Tablet, 1 Tablet, Oral, Q6H PRN, Freddy Ambrocio MD, 1 Tablet at 02/09/23 0542    amLODIPine (NORVASC) tablet 10 mg, 10 mg, Oral, DAILY, Daija Palacios NP, 10 mg at 02/10/23 0620    insulin lispro (HUMALOG) injection 8 Units, 8 Units, SubCUTAneous, TID WITH MEALS, Daija Palacios, NP, 8 Units at 02/10/23 0828    insulin lispro (HUMALOG) injection, , SubCUTAneous, AC&HS, Daija Palacios, NP, 7 Units at 02/10/23 0828    albuterol-ipratropium (DUO-NEB) 2.5 MG-0.5 MG/3 ML, 3 mL, Nebulization, Q6H RT, Daija Palacios NP, 3 mL at 02/10/23 0744    albuterol CONCENTRATE 2.5mg/0.5 mL neb soln, 2.5 mg, Nebulization, Q6H PRN, Daija Palacios, NP    montelukast (SINGULAIR) tablet 10 mg, 10 mg, Oral, QHS, Daija Suzanne, NP, 10 mg at 02/10/23 0828    budesonide (PULMICORT) 500 mcg/2 ml nebulizer suspension, 500 mcg, Nebulization, BID RT, Genaro Jordan, , 500 mcg at 02/10/23 0744    guaiFENesin ER (MUCINEX) tablet 1,200 mg, 1,200 mg, Oral, BID, Genaro Jordan, DO, 1,200 mg at 02/10/23 0827    benzonatate (TESSALON) capsule 200 mg, 200 mg, Oral, TID, Genaro Jordan, , 200 mg at 02/10/23 0827    insulin glargine (LANTUS) injection 50 Units, 50 Units, SubCUTAneous, QHS, Lamar Pena NP, 50 Units at 02/09/23 2119    guaiFENesin (ROBITUSSIN) 100 mg/5 mL oral liquid 100 mg, 100 mg, Oral, Q6H PRN, Daija Palacios NP, 100 mg at 02/09/23 0140    sodium chloride (NS) flush 5-40 mL, 5-40 mL, IntraVENous, Q8H, Ajit Fernandez MD, 10 mL at 02/10/23 0534    sodium chloride (NS) flush 5-40 mL, 5-40 mL, IntraVENous, PRN, Ajit Fernandez MD    acetaminophen (TYLENOL) tablet 650 mg, 650 mg, Oral, Q6H PRN **OR** acetaminophen (TYLENOL) suppository 650 mg, 650 mg, Rectal, Q6H PRN, Jonelle Fernandez MD    polyethylene glycol (MIRALAX) packet 17 g, 17 g, Oral, DAILY PRN, Jonelle Fernandez MD    ondansetron (ZOFRAN ODT) tablet 4 mg, 4 mg, Oral, Q8H PRN **OR** ondansetron (ZOFRAN) injection 4 mg, 4 mg, IntraVENous, Q6H PRN, Jonelle Zamudio MD, 4 mg at 02/08/23 2154    enoxaparin (LOVENOX) injection 40 mg, 40 mg, SubCUTAneous, Q12H, Jonelle Fernandez MD, 40 mg at 02/10/23 7910    cefTRIAXone (ROCEPHIN) 1 g in sterile water (preservative free) 10 mL IV syringe, 1 g, IntraVENous, Q24H, Genaro Jordan DO, 1 g at 02/09/23 1549    glucose chewable tablet 16 g, 4 Tablet, Oral, PRN, Jonelle Fernandez MD    glucagon (GLUCAGEN) injection 1 mg, 1 mg, IntraMUSCular, PRN, Jas Costa MD    dextrose 10% infusion 0-250 mL, 0-250 mL, IntraVENous, PRN, Jas Costa MD       ASSESSMENT:    Mild persistent asthma exacerbation:  -Continue albuterol nebulization, rocephin and azithromycin, steroids.  -Continue to monitor.  -Currently on room air  -Continue singulair  -Pulmonary following  -Outpatient overnight sleep study at discharge     Community-acquired pneumonia:  -Continue ceftriaxone and azithromycin x 7 days total  -Continue steroids     Diabetes mellitus type 2:  -Patient takes Lantus 50 units at nighttime.  -Started the patient on Lantus 20 units at nighttime and sliding scale in the hospital.  -Hold metformin     Morbid obesity:  -BMI 48.61.  -Complicates overall care.   -Encouraged weight loss  -Dietary lifestyle modification discussed  -Suspect underlying KRYSTINA    Acute dehydration/hyponatremia  Gentle IV hydration  Encourage p.o. intake      Full Code  Dvt Prophylaxis Lovenox  GI Prophylaxis not warranted  Dispo: 24-48 hrs pending clinical improvement, case management for home O2 arrangement    Above treatment plan reviewed and discussed with patient in detail at bedside, all questions answered. Care Plan discussed with: Patient/Family    Total time spent with patient: 35 minutes.     Cherelle Solares NP

## 2023-02-11 LAB
ALBUMIN SERPL-MCNC: 3 G/DL (ref 3.5–5)
ANION GAP SERPL CALC-SCNC: 3 MMOL/L (ref 5–15)
BACTERIA SPEC CULT: NORMAL
BUN SERPL-MCNC: 15 MG/DL (ref 6–20)
BUN/CREAT SERPL: 16 (ref 12–20)
CA-I BLD-MCNC: 9.5 MG/DL (ref 8.5–10.1)
CHLORIDE SERPL-SCNC: 102 MMOL/L (ref 97–108)
CO2 SERPL-SCNC: 30 MMOL/L (ref 21–32)
CREAT SERPL-MCNC: 0.92 MG/DL (ref 0.55–1.02)
ERYTHROCYTE [DISTWIDTH] IN BLOOD BY AUTOMATED COUNT: 14.3 % (ref 11.5–14.5)
GLUCOSE BLD STRIP.AUTO-MCNC: 228 MG/DL (ref 65–100)
GLUCOSE BLD STRIP.AUTO-MCNC: 334 MG/DL (ref 65–100)
GLUCOSE BLD STRIP.AUTO-MCNC: 339 MG/DL (ref 65–100)
GLUCOSE BLD STRIP.AUTO-MCNC: 344 MG/DL (ref 65–100)
GLUCOSE SERPL-MCNC: 268 MG/DL (ref 65–100)
GRAM STN SPEC: NORMAL
HCT VFR BLD AUTO: 38.5 % (ref 35–47)
HGB BLD-MCNC: 12.3 G/DL (ref 11.5–16)
LACTATE SERPL-SCNC: 2.7 MMOL/L (ref 0.4–2)
MAGNESIUM SERPL-MCNC: 2.3 MG/DL (ref 1.6–2.4)
MCH RBC QN AUTO: 27.9 PG (ref 26–34)
MCHC RBC AUTO-ENTMCNC: 31.9 G/DL (ref 30–36.5)
MCV RBC AUTO: 87.3 FL (ref 80–99)
NRBC # BLD: 0 K/UL (ref 0–0.01)
NRBC BLD-RTO: 0 PER 100 WBC
PERFORMED BY, TECHID: ABNORMAL
PHOSPHATE SERPL-MCNC: 2.8 MG/DL (ref 2.6–4.7)
PLATELET # BLD AUTO: 316 K/UL (ref 150–400)
PMV BLD AUTO: 10.1 FL (ref 8.9–12.9)
POTASSIUM SERPL-SCNC: 4 MMOL/L (ref 3.5–5.1)
RBC # BLD AUTO: 4.41 M/UL (ref 3.8–5.2)
SODIUM SERPL-SCNC: 135 MMOL/L (ref 136–145)
SPECIAL REQUESTS,SREQ: NORMAL
WBC # BLD AUTO: 8.6 K/UL (ref 3.6–11)

## 2023-02-11 PROCEDURE — 74011250637 HC RX REV CODE- 250/637: Performed by: INTERNAL MEDICINE

## 2023-02-11 PROCEDURE — 74011000250 HC RX REV CODE- 250: Performed by: INTERNAL MEDICINE

## 2023-02-11 PROCEDURE — 74011000250 HC RX REV CODE- 250: Performed by: NURSE PRACTITIONER

## 2023-02-11 PROCEDURE — 74011250636 HC RX REV CODE- 250/636: Performed by: INTERNAL MEDICINE

## 2023-02-11 PROCEDURE — 74011000250 HC RX REV CODE- 250: Performed by: STUDENT IN AN ORGANIZED HEALTH CARE EDUCATION/TRAINING PROGRAM

## 2023-02-11 PROCEDURE — 65270000029 HC RM PRIVATE

## 2023-02-11 PROCEDURE — 74011636637 HC RX REV CODE- 636/637: Performed by: NURSE PRACTITIONER

## 2023-02-11 PROCEDURE — 74011250636 HC RX REV CODE- 250/636: Performed by: STUDENT IN AN ORGANIZED HEALTH CARE EDUCATION/TRAINING PROGRAM

## 2023-02-11 PROCEDURE — 82962 GLUCOSE BLOOD TEST: CPT

## 2023-02-11 PROCEDURE — 74011250637 HC RX REV CODE- 250/637: Performed by: NURSE PRACTITIONER

## 2023-02-11 PROCEDURE — 74011250636 HC RX REV CODE- 250/636: Performed by: NURSE PRACTITIONER

## 2023-02-11 PROCEDURE — 36415 COLL VENOUS BLD VENIPUNCTURE: CPT

## 2023-02-11 PROCEDURE — 83735 ASSAY OF MAGNESIUM: CPT

## 2023-02-11 PROCEDURE — 74011250637 HC RX REV CODE- 250/637: Performed by: STUDENT IN AN ORGANIZED HEALTH CARE EDUCATION/TRAINING PROGRAM

## 2023-02-11 PROCEDURE — 80069 RENAL FUNCTION PANEL: CPT

## 2023-02-11 PROCEDURE — 94640 AIRWAY INHALATION TREATMENT: CPT

## 2023-02-11 PROCEDURE — 83605 ASSAY OF LACTIC ACID: CPT

## 2023-02-11 PROCEDURE — 85027 COMPLETE CBC AUTOMATED: CPT

## 2023-02-11 PROCEDURE — 94761 N-INVAS EAR/PLS OXIMETRY MLT: CPT

## 2023-02-11 RX ORDER — HYDRALAZINE HYDROCHLORIDE 25 MG/1
25 TABLET, FILM COATED ORAL 3 TIMES DAILY
Status: DISCONTINUED | OUTPATIENT
Start: 2023-02-11 | End: 2023-02-14 | Stop reason: HOSPADM

## 2023-02-11 RX ADMIN — IPRATROPIUM BROMIDE AND ALBUTEROL SULFATE 3 ML: 2.5; .5 SOLUTION RESPIRATORY (INHALATION) at 14:04

## 2023-02-11 RX ADMIN — METHYLPREDNISOLONE SODIUM SUCCINATE 40 MG: 40 INJECTION, POWDER, FOR SOLUTION INTRAMUSCULAR; INTRAVENOUS at 13:41

## 2023-02-11 RX ADMIN — GUAIFENESIN 100 MG: 100 SOLUTION ORAL at 08:49

## 2023-02-11 RX ADMIN — IPRATROPIUM BROMIDE AND ALBUTEROL SULFATE 3 ML: 2.5; .5 SOLUTION RESPIRATORY (INHALATION) at 02:00

## 2023-02-11 RX ADMIN — BENZONATATE 200 MG: 100 CAPSULE ORAL at 15:42

## 2023-02-11 RX ADMIN — INSULIN LISPRO 8 UNITS: 100 INJECTION, SOLUTION INTRAVENOUS; SUBCUTANEOUS at 17:23

## 2023-02-11 RX ADMIN — AMLODIPINE BESYLATE 10 MG: 5 TABLET ORAL at 08:45

## 2023-02-11 RX ADMIN — ENOXAPARIN SODIUM 40 MG: 100 INJECTION SUBCUTANEOUS at 21:59

## 2023-02-11 RX ADMIN — SODIUM CHLORIDE, PRESERVATIVE FREE 10 ML: 5 INJECTION INTRAVENOUS at 13:44

## 2023-02-11 RX ADMIN — INSULIN LISPRO 7 UNITS: 100 INJECTION, SOLUTION INTRAVENOUS; SUBCUTANEOUS at 17:22

## 2023-02-11 RX ADMIN — MONTELUKAST 10 MG: 10 TABLET, FILM COATED ORAL at 08:46

## 2023-02-11 RX ADMIN — SODIUM CHLORIDE 75 ML/HR: 9 INJECTION, SOLUTION INTRAVENOUS at 13:40

## 2023-02-11 RX ADMIN — GUAIFENESIN 1200 MG: 600 TABLET, EXTENDED RELEASE ORAL at 08:45

## 2023-02-11 RX ADMIN — ENOXAPARIN SODIUM 40 MG: 100 INJECTION SUBCUTANEOUS at 08:45

## 2023-02-11 RX ADMIN — INSULIN LISPRO 3 UNITS: 100 INJECTION, SOLUTION INTRAVENOUS; SUBCUTANEOUS at 13:43

## 2023-02-11 RX ADMIN — BUDESONIDE 500 MCG: 0.5 SUSPENSION RESPIRATORY (INHALATION) at 08:03

## 2023-02-11 RX ADMIN — INSULIN GLARGINE 50 UNITS: 100 INJECTION, SOLUTION SUBCUTANEOUS at 22:00

## 2023-02-11 RX ADMIN — METHYLPREDNISOLONE SODIUM SUCCINATE 40 MG: 40 INJECTION, POWDER, FOR SOLUTION INTRAMUSCULAR; INTRAVENOUS at 07:41

## 2023-02-11 RX ADMIN — METHYLPREDNISOLONE SODIUM SUCCINATE 40 MG: 40 INJECTION, POWDER, FOR SOLUTION INTRAMUSCULAR; INTRAVENOUS at 21:59

## 2023-02-11 RX ADMIN — SODIUM CHLORIDE, PRESERVATIVE FREE 10 ML: 5 INJECTION INTRAVENOUS at 07:41

## 2023-02-11 RX ADMIN — BENZONATATE 200 MG: 100 CAPSULE ORAL at 21:59

## 2023-02-11 RX ADMIN — BENZONATATE 200 MG: 100 CAPSULE ORAL at 08:45

## 2023-02-11 RX ADMIN — INSULIN LISPRO 8 UNITS: 100 INJECTION, SOLUTION INTRAVENOUS; SUBCUTANEOUS at 08:54

## 2023-02-11 RX ADMIN — CEFTRIAXONE SODIUM 1 G: 1 INJECTION, POWDER, FOR SOLUTION INTRAMUSCULAR; INTRAVENOUS at 13:41

## 2023-02-11 RX ADMIN — INSULIN LISPRO 7 UNITS: 100 INJECTION, SOLUTION INTRAVENOUS; SUBCUTANEOUS at 21:59

## 2023-02-11 RX ADMIN — INSULIN LISPRO 8 UNITS: 100 INJECTION, SOLUTION INTRAVENOUS; SUBCUTANEOUS at 13:42

## 2023-02-11 RX ADMIN — HYDRALAZINE HYDROCHLORIDE 25 MG: 25 TABLET, FILM COATED ORAL at 21:58

## 2023-02-11 RX ADMIN — HYDRALAZINE HYDROCHLORIDE 25 MG: 25 TABLET, FILM COATED ORAL at 15:42

## 2023-02-11 RX ADMIN — SODIUM CHLORIDE, PRESERVATIVE FREE 10 ML: 5 INJECTION INTRAVENOUS at 22:35

## 2023-02-11 RX ADMIN — Medication 1 LOZENGE: at 15:42

## 2023-02-11 RX ADMIN — INSULIN LISPRO 7 UNITS: 100 INJECTION, SOLUTION INTRAVENOUS; SUBCUTANEOUS at 08:46

## 2023-02-11 RX ADMIN — IPRATROPIUM BROMIDE AND ALBUTEROL SULFATE 3 ML: 2.5; .5 SOLUTION RESPIRATORY (INHALATION) at 08:02

## 2023-02-11 RX ADMIN — GUAIFENESIN 1200 MG: 600 TABLET, EXTENDED RELEASE ORAL at 21:58

## 2023-02-11 RX ADMIN — POLYETHYLENE GLYCOL 3350 17 G: 17 POWDER, FOR SOLUTION ORAL at 08:49

## 2023-02-11 NOTE — PROGRESS NOTES
Problem: Pain  Goal: *Control of Pain  Outcome: Progressing Towards Goal  Goal: *PALLIATIVE CARE:  Alleviation of Pain  Outcome: Progressing Towards Goal     Problem: Patient Education: Go to Patient Education Activity  Goal: Patient/Family Education  Outcome: Progressing Towards Goal     Problem: Impaired Skin Integrity/Pressure Injury Treatment  Goal: *Improvement of Existing Pressure Injury  Outcome: Progressing Towards Goal  Goal: *Prevention of pressure injury  Description: Document Maurilio Scale and appropriate interventions in the flowsheet. Outcome: Progressing Towards Goal  Note: Pressure Injury Interventions:                        Problem: Impaired Skin Integrity/Pressure Injury Treatment  Goal: *Improvement of Existing Pressure Injury  Outcome: Progressing Towards Goal  Goal: *Prevention of pressure injury  Description: Document Maurilio Scale and appropriate interventions in the flowsheet.   Outcome: Progressing Towards Goal  Note: Pressure Injury Interventions:                      Nutrition Interventions: Discuss nutritional consult with provider

## 2023-02-11 NOTE — PROGRESS NOTES
Pulmonology and Critical Care Progress Note    Subjective:     2/10/2023: Patient seen and examined in her room on the floor this morning, no acute events overnight. Definitely feels better today from a breathing standpoint, but still feels like she needs another 24 hours. Labs are pending today, most recent lactate was still 2.6, repeat pending. COVID-19 testing and mycoplasma screen both negative, blood sugar still in the 300s, primary team managing glycemic control. Come down on Solu-Medrol 40 mg IV every 8 hours, otherwise continue current antibiotics for 7 days and current nebulizer therapies. TTE completed showing an EF of 55 to 70% with a normal RV, low suspicion for pulmonary hypertension. Patient completed an overnight oximetry test last night and desaturated while sleeping less than 90% for 4 hours and 23 minutes, desaturated less than 88% for 29 minutes. We will consult case management to set up nocturnal home O2 prior to setting up an appointment with me in 2 to 4 weeks for KRYSTINA evaluation. Patient seen and examined in the room. She is sitting comfortably in bed. On room air. Patient stated that she cannot go home because apparently the [employer] company cannot deliver oxygen today. Otherwise she feels better. Some sputum production.           Current Facility-Administered Medications   Medication Dose Route Frequency Provider Last Rate Last Admin    benzocaine-menthoL (CHLORASEPTIC) lozenge 1 Lozenge  1 Lozenge Mucous Membrane PRN Sheri Sides, NP        0.9% sodium chloride infusion  75 mL/hr IntraVENous CONTINUOUS Sheri Sides, NP 75 mL/hr at 02/10/23 0829 75 mL/hr at 02/10/23 0829    azithromycin (ZITHROMAX) 500 mg in 0.9% sodium chloride 250 mL ([id number])  500 mg IntraVENous Q24H Genaro Jordan  mL/hr at 02/10/23 2345 500 mg at 02/10/23 2345    methylPREDNISolone (PF) (SOLU-MEDROL) injection 40 mg  40 mg IntraVENous Q8H Genaro Jordan DO   40 mg at 02/11/23 0741 butalbital-acetaminophen-caffeine (FIORICET, ESGIC) -40 mg per tablet 1 Tablet  1 Tablet Oral Q6H PRN Angelica Clay MD   1 Tablet at 02/09/23 0542    amLODIPine (NORVASC) tablet 10 mg  10 mg Oral DAILY Tevin Grandchild, NP   10 mg at 02/11/23 0845    insulin lispro (HUMALOG) injection 8 Units  8 Units SubCUTAneous TID WITH MEALS Tevin Grandchild, NP   8 Units at 02/11/23 0854    insulin lispro (HUMALOG) injection   SubCUTAneous AC&HS Tevin Grandchild, NP   7 Units at 02/11/23 0846    albuterol-ipratropium (DUO-NEB) 2.5 MG-0.5 MG/3 ML  3 mL Nebulization Q6H RT Tevin Grandchild, NP   3 mL at 02/11/23 0802    albuterol CONCENTRATE 2.5mg/0.5 mL neb soln  2.5 mg Nebulization Q6H PRN Tevin Grandchild, NP        montelukast (SINGULAIR) tablet 10 mg  10 mg Oral QHS Tevin Grandchild, NP   10 mg at 02/11/23 0846    budesonide (PULMICORT) 500 mcg/2 ml nebulizer suspension  500 mcg Nebulization BID RT Genaro Jordan DO   500 mcg at 02/11/23 0803    guaiFENesin ER (MUCINEX) tablet 1,200 mg  1,200 mg Oral BID Genaro Jordan,    1,200 mg at 02/11/23 0845    benzonatate (TESSALON) capsule 200 mg  200 mg Oral TID Genaro Jordan DO   200 mg at 02/11/23 0845    insulin glargine (LANTUS) injection 50 Units  50 Units SubCUTAneous QHS Tevin Grandchild, NP   50 Units at 02/10/23 2201    guaiFENesin (ROBITUSSIN) 100 mg/5 mL oral liquid 100 mg  100 mg Oral Q6H PRN Tevin Grandchild, NP   100 mg at 02/11/23 0849    sodium chloride (NS) flush 5-40 mL  5-40 mL IntraVENous Q8H Grace Fernandez MD   10 mL at 02/11/23 0741    sodium chloride (NS) flush 5-40 mL  5-40 mL IntraVENous PRN Mercedez Montoya MD        acetaminophen (TYLENOL) tablet 650 mg  650 mg Oral Q6H PRN Mercedez Montoya MD        Or    acetaminophen (TYLENOL) suppository 650 mg  650 mg Rectal Q6H PRN Grace Fernandez MD        polyethylene glycol (MIRALAX) packet 17 g  17 g Oral DAILY PRN Mercedez Montoya MD   17 g at 02/11/23 0849    ondansetron (ZOFRAN ODT) tablet 4 mg 4 mg Oral Q8H PRN Courtland Boast, MD        Or    ondansetron [facility]) injection 4 mg  4 mg IntraVENous Q6H PRN Courtland Boast, MD   4 mg at 23 2154    enoxaparin (LOVENOX) injection 40 mg  40 mg SubCUTAneous Q12H Courtland Boast, MD   40 mg at 23 0845    cefTRIAXone (ROCEPHIN) 1 g in sterile water (preservative free) 10 mL IV syringe  1 g IntraVENous Q24H Genaro Jordan DO   1 g at 02/10/23 1426    glucose chewable tablet 16 g  4 Tablet Oral PRN Courtland Boast, MD        glucagon (GLUCAGEN) injection 1 mg  1 mg IntraMUSCular PRN Courtland Boast, MD        dextrose 10% infusion 0-250 mL  0-250 mL IntraVENous PRN Courtland Boast, MD            No Known Allergies    Review of Systems:  A comprehensive review of systems was negative except for that written in the HPI. Objective:     Blood pressure (!) 182/98, pulse 67, temperature 98.2 °F (36.8 °C), resp. rate 20, height 5' 5\" (1.651 m), weight (!) 161.9 kg (357 lb), last menstrual period 2022, SpO2 95 %, not currently breastfeeding. Temp (24hrs), Av.2 °F (36.8 °C), Min:97.4 °F (36.3 °C), Max:98.5 °F (36.9 °C)      Intake and Output:  Current Shift: No intake/output data recorded. Last 3 Shifts:  1901 -  0700  In: 863.8 [I.V.:863.8]  Out: -     Physical Exam:   General appearance: alert, cooperative, no distress, appears stated age, morbidly obese  Head: Normocephalic, without obvious abnormality, atraumatic  Eyes: negative  Neck: supple, symmetrical, trachea midline and no adenopathy  Lungs: Expiratory wheezing bilaterally is improved today, no obvious rhonchi, currently on room air  Heart: regular rate and rhythm, S1, S2 normal, no murmur, click, rub or gallop  Abdomen: soft, non-tender.  Bowel sounds normal. No masses,  no organomegaly  Extremities: extremities normal, atraumatic, no cyanosis or edema  Pulses: 2+ and symmetric  Skin: Skin color, texture, turgor normal. No rashes or lesions  Lymph nodes: Cervical, supraclavicular, and axillary nodes normal.  Neurologic: Grossly normal, alert and oriented x3    Additional comments:None    Lab/Data Review: All lab results for the last 24 hours reviewed.   Recent Results (from the past 24 hour(s))   GLUCOSE, POC    Collection Time: 02/10/23  4:28 PM   Result Value Ref Range    Glucose (POC) 383 (H) 65 - 100 mg/dL    Performed by Butch Jackson    GLUCOSE, POC    Collection Time: 02/10/23  9:49 PM   Result Value Ref Range    Glucose (POC) 458 (H) 65 - 100 mg/dL    Performed by Marvel PAULINO/ Jeevan Berger [facility], POC    Collection Time: 02/11/23  7:33 AM   Result Value Ref Range    Glucose (POC) 334 (H) 65 - 100 mg/dL    Performed by Butch Jackson    RENAL FUNCTION PANEL    Collection Time: 02/11/23 10:45 AM   Result Value Ref Range    Sodium 135 (L) 136 - 145 mmol/L    Potassium 4.0 3.5 - 5.1 mmol/L    Chloride 102 97 - 108 mmol/L    CO2 30 21 - 32 mmol/L    Anion gap 3 (L) 5 - 15 mmol/L    Glucose 268 (H) 65 - 100 mg/dL    BUN 15 6 - 20 mg/dL    Creatinine 0.92 0.55 - 1.02 mg/dL    BUN/Creatinine ratio 16 12 - 20      eGFR >60 >60 ml/min/1.73m2    Calcium 9.5 8.5 - 10.1 mg/dL    Phosphorus 2.8 2.6 - 4.7 mg/dL    Albumin 3.0 (L) 3.5 - 5.0 g/dL   MAGNESIUM    Collection Time: 02/11/23 10:45 AM   Result Value Ref Range    Magnesium 2.3 1.6 - 2.4 mg/dL   CBC W/O DIFF    Collection Time: 02/11/23 10:45 AM   Result Value Ref Range    WBC 8.6 3.6 - 11.0 K/uL    RBC 4.41 3.80 - 5.20 M/uL    HGB 12.3 11.5 - 16.0 g/dL    HCT 38.5 35.0 - 47.0 %    MCV 87.3 80.0 - 99.0 FL    MCH 27.9 26.0 - 34.0 PG    MCHC 31.9 30.0 - 36.5 g/dL    RDW 14.3 11.5 - 14.5 %    PLATELET 723 846 - 656 K/uL    MPV 10.1 8.9 - 12.9 FL    NRBC 0.0 0.0  WBC    ABSOLUTE NRBC 0.00 0.00 - 0.01 K/uL   LACTIC ACID    Collection Time: 02/11/23 10:45 AM   Result Value Ref Range    Lactic acid 2.7 (HH) 0.4 - 2.0 mmol/L   GLUCOSE, POC    Collection Time: 02/11/23 11:35 AM   Result Value Ref Range    Glucose (POC) 228 (H) 65 - 100 mg/dL    Performed by Bishop Terrazas        Chest X-Ray:   CXR Results  (Last 48 hours)      None              CT imaging:  CT Results  (Last 48 hours)      None            PFTs:   PFT Results  (Last 3 results in the past 10 years)      None              Assessment:     Patient is a 40-year-old morbidly obese -American female with a history of insulin-dependent type 2 diabetes mellitus, mild intermittent asthma, and hypertension who presented to the hospital with increasing shortness of breath and wheezing for the past several days consistent with an acute exacerbation of asthma. Plan:     1.)  Acute hypoxic respiratory failure  -Secondary to acute exacerbation of asthma in the setting of community-acquired pneumonia, targeted therapies as outlined below  -Currently on room air, weaned off for sats greater than 90% on room air  -I will see her in the office in 2 to 4 weeks for outpatient PFTs and asthma management  -Overnight oximetry confirms nocturnal hypoxemia. Case management consultation for supplemental oxygen at nighttime while sleeping, recommend 2 L nasal cannula. -Plan needs an KRYSTINA evaluation as well, definitely needs significant weight loss    2.)  Acute exacerbation of asthma  -Suspect secondary to community-acquired pneumonia, targeted therapies as outlined below  -Continue antibiotics as below  -Decreased Solu-Medrol to 40 mg IV every 8 hours  -DuoNeb nebulizers every 6 hours and Pulmicort nebs twice daily  -Overnight oximetry confirms nocturnal hypoxemia.   Case management consultation for supplemental oxygen at nighttime while sleeping.  -Continue home Singulair    3.)  Community-acquired pneumonia  -Very mild infiltrates in the right lower lobe, despite read on CT scan I do not appreciate much left upper lobe infiltrates, does have mosaicism bilaterally  -Could be impetus for asthma exacerbation, influenza screen negative  -Expanded infectious work-up negative  -Agree with IV Rocephin/azithromycin x7 days    4.)  Lactic acidosis  -Lactate level 3.4 on admission, had come down to 2.6  -Suspect secondary to respiratory muscle acidosis from increased work of breathing    5.)  Morbid obesity  -BMI 59.41, due to excess calories  -Significant weight loss recommended  -Needs obstructive sleep apnea evaluation in the outpatient setting    Patient can be discharged from pulmonary standpoint as discussed above. However awaiting nocturnal O2 2 L to be delivered at home.   Total time spent with patient: 30 minutes      Angie Rodney MD  [facility] ([facility])  2/11/2023

## 2023-02-11 NOTE — PROGRESS NOTES
[address] [facility] [address] NP to unit. Report given on patients /98 HR 67.  NP order to proceed with morning Norvasc 10 mg.    1137- Call from lab, critical lab Lactic acid 2.7 verbal result from [address] [place] [address]. Perfect serve to Lolita Helton NP at [phone], NP noted message received. No new orders given. 1402:  Perfect serve sent to Lolita Helton NP to provide afternoon /95 HR 79.   NP placed order for Hydralazine 25mg TID

## 2023-02-11 NOTE — PROGRESS NOTES
Hospitalist Progress Note         CorbyDAVON Cox, FNP-C    Daily Progress Note: 2/11/2023    24-year-old lady with past medical history significant for diabetes mellitus type 2, asthma presented to the hospital complaining of shortness of breath, cough with sputum production. Symptoms started 2 days prior to admission. Patient has been experiencing significant shortness of breath, wheezing. Patient used her rescue inhaler but it did not help her. Patient also has been experiencing significant productive cough. Patient presented to the hospital for further evaluation. In the ER, patient was found to be hypoxic requiring supplemental oxygen. Even after providing multiple breathing treatments, steroids, antibiotics patient continued to remain short of breath requiring supplemental oxygen. Internal medicine was consulted for admission. We were asked to admit for work up and evaluation of the above problems. Patient started on Solu-Medrol, IV Rocephin and azithromycin. Start singulair. Consult pulmonary. Pulmonary proximity shows that patient O2 sats was less than 90% over 4 hours. Patient O2 sats was less than 88% for 29.3 minutes. Subjective:   Subjective   Patient examined alert and oriented sitting on bedside. Patient currently maintaining sats on room air. No acute distress noted on examination. Review of Systems:   Review of Systems   Constitutional:  Negative for chills and fever. Respiratory:  Positive for cough, shortness of breath and wheezing. Dyspnea on exertion   Cardiovascular:  Negative for chest pain. Gastrointestinal:  Negative for abdominal pain, nausea and vomiting. Genitourinary:  Negative for dysuria.        Objective:   Objective      Vitals:  Patient Vitals for the past 12 hrs:   Temp Pulse Resp BP SpO2   02/11/23 1404 -- -- -- -- 95 %   02/11/23 0803 -- -- -- -- 95 %   02/11/23 0750 98.2 °F (36.8 °C) 67 20 (!) 182/98 96 %   02/11/23 0415 97.4 °F (36.3 °C) 72 20 (!) 156/103 95 %   02/11/23 0400 -- 70 -- -- --          Physical Exam  Vitals and nursing note reviewed. Constitutional:       Appearance: She is obese. Cardiovascular:      Rate and Rhythm: Normal rate. Pulmonary:      Breath sounds: Wheezing present. Comments: Expiratory wheezes, room air  Abdominal:      General: Bowel sounds are normal.   Skin:     General: Skin is warm and dry. Neurological:      Mental Status: She is alert and oriented to person, place, and time.         Lab Results:  Recent Results (from the past 24 hour(s))   GLUCOSE, POC    Collection Time: 02/10/23  4:28 PM   Result Value Ref Range    Glucose (POC) 383 (H) 65 - 100 mg/dL    Performed by No Del Valle    GLUCOSE, POC    Collection Time: 02/10/23  9:49 PM   Result Value Ref Range    Glucose (POC) 458 (H) 65 - 100 mg/dL    Performed by Marvel PAULINO/ Jeevan Berger [facility], POC    Collection Time: 02/11/23  7:33 AM   Result Value Ref Range    Glucose (POC) 334 (H) 65 - 100 mg/dL    Performed by No Del Valle    RENAL FUNCTION PANEL    Collection Time: 02/11/23 10:45 AM   Result Value Ref Range    Sodium 135 (L) 136 - 145 mmol/L    Potassium 4.0 3.5 - 5.1 mmol/L    Chloride 102 97 - 108 mmol/L    CO2 30 21 - 32 mmol/L    Anion gap 3 (L) 5 - 15 mmol/L    Glucose 268 (H) 65 - 100 mg/dL    BUN 15 6 - 20 mg/dL    Creatinine 0.92 0.55 - 1.02 mg/dL    BUN/Creatinine ratio 16 12 - 20      eGFR >60 >60 ml/min/1.73m2    Calcium 9.5 8.5 - 10.1 mg/dL    Phosphorus 2.8 2.6 - 4.7 mg/dL    Albumin 3.0 (L) 3.5 - 5.0 g/dL   MAGNESIUM    Collection Time: 02/11/23 10:45 AM   Result Value Ref Range    Magnesium 2.3 1.6 - 2.4 mg/dL   CBC W/O DIFF    Collection Time: 02/11/23 10:45 AM   Result Value Ref Range    WBC 8.6 3.6 - 11.0 K/uL    RBC 4.41 3.80 - 5.20 M/uL    HGB 12.3 11.5 - 16.0 g/dL    HCT 38.5 35.0 - 47.0 %    MCV 87.3 80.0 - 99.0 FL    MCH 27.9 26.0 - 34.0 PG    MCHC 31.9 30.0 - 36.5 g/dL    RDW 14.3 11.5 - 14.5 %    PLATELET 316 150 - 400 K/uL    MPV 10.1 8.9 - 12.9 FL    NRBC 0.0 0.0  WBC    ABSOLUTE NRBC 0.00 0.00 - 0.01 K/uL   LACTIC ACID    Collection Time: 02/11/23 10:45 AM   Result Value Ref Range    Lactic acid 2.7 (HH) 0.4 - 2.0 mmol/L   GLUCOSE, POC    Collection Time: 02/11/23 11:35 AM   Result Value Ref Range    Glucose (POC) 228 (H) 65 - 100 mg/dL    Performed by Janak Paredes       Results       Procedure Component Value Units Date/Time    LEGIONELLA PNEUMOPHILA AG, URINE [[id number]] Collected: 02/09/23 1455    Order Status: Sent Specimen: Urine Updated: 02/09/23 1504    CULTURE, RESPIRATORY/SPUTUM/BRONCH Betito Relic STAIN [[id number]] Collected: 02/09/23 1455    Order Status: Completed Specimen: Sputum Updated: 02/11/23 0043     Special Requests: No Special Requests        GRAM STAIN Rare WBCs seen               Rare Epithelial cells seen            No organisms seen        Culture result: PENDING    COVID-19 RAPID TEST [[id number]] Collected: 02/09/23 1244    Order Status: Completed Specimen: Nasopharyngeal Updated: 02/09/23 1322     COVID-19 rapid test Not Detected        Comment: Rapid Abbott ID Now   The specimen is NEGATIVE for SARS-CoV2, the novel coronavirus associated with COVID-19. A negative result does not rule out COVID-19. This test has been authorized by the FDA under an Emergency Use Authorization (EUA) for use by authorized laboratories.    Fact sheet for Healthcare Providers:  http://www.rey-shar.biz/ Fact sheet for Patients: http://www.le-myles.biz/   Methodology: Isothermal Nucleic Acid Amplification       MYCOPLASMA AB, IGM [[id number]] Collected: 02/09/23 1122    Order Status: Completed Specimen: Serum Updated: 02/09/23 2325     Mycoplasma Ab, IgM NONREACTIVE       CULTURE, BLOOD, PAIRED [[id number]] Collected: 02/06/23 2328    Order Status: Completed Specimen: Blood Updated: 02/11/23 0751     Special Requests: No Special Requests        Culture result: No growth 3 days       INFLUENZA A & B AG (RAPID TEST) [[id number]] Collected: 02/06/23 1933    Order Status: Completed Specimen: Nasal washing Updated: 02/06/23 1953     Influenza A Antigen Negative        Influenza B Antigen Negative       INFLUENZA A & B AG (RAPID TEST) [[id number]] Collected: 02/06/23 1930    Order Status: Canceled Specimen: Nasopharyngeal from Nasal washing              Diagnostic Images:      CTA CHEST W OR W WO CONT   Final Result   No evidence of acute pulmonary embolus. Airspace disease in the right lower lobe   and left upper lobe suggests pneumonia. Indeterminate enlarged superior   mediastinal lymph node; CT follow-up is recommended. Enlarged main pulmonary   artery suggests pulmonary hypertension. XR CHEST PORT   Final Result   1.  No acute disease                    Current Medications:    Current Facility-Administered Medications:     benzocaine-menthoL (CHLORASEPTIC) lozenge 1 Lozenge, 1 Lozenge, Mucous Membrane, PRN, Curtistine Moder, NP    hydrALAZINE (APRESOLINE) tablet 25 mg, 25 mg, Oral, TID, Curtistine Moder, NP    0.9% sodium chloride infusion, 75 mL/hr, IntraVENous, CONTINUOUS, Curtistine Moder, NP, Last Rate: 75 mL/hr at 02/11/23 1340, 75 mL/hr at 02/11/23 1340    azithromycin (ZITHROMAX) 500 mg in 0.9% sodium chloride 250 mL ([id number]), 500 mg, IntraVENous, Q24H, Genaro Jordan, , Last Rate: 250 mL/hr at 02/10/23 2345, 500 mg at 02/10/23 2345    methylPREDNISolone (PF) (SOLU-MEDROL) injection 40 mg, 40 mg, IntraVENous, Q8H, Genaro Jordan, DO, 40 mg at 02/11/23 1341    butalbital-acetaminophen-caffeine (FIORICET, ESGIC) -40 mg per tablet 1 Tablet, 1 Tablet, Oral, Q6H PRN, Freddy Ambrocio MD, 1 Tablet at 02/09/23 0542    amLODIPine (NORVASC) tablet 10 mg, 10 mg, Oral, DAILY, Lamar Pena NP, 10 mg at 02/11/23 0845    insulin lispro (HUMALOG) injection 8 Units, 8 Units, SubCUTAneous, TID WITH MEALS, Curtistine Moder, NP, 8 Units at 02/11/23 1342    insulin lispro (HUMALOG) injection, , SubCUTAneous, AC&HS, Ivon Kelly, NP, 3 Units at 02/11/23 1343    albuterol-ipratropium (DUO-NEB) 2.5 MG-0.5 MG/3 ML, 3 mL, Nebulization, Q6H RT, Ivon Kelly, NP, 3 mL at 02/11/23 1404    albuterol CONCENTRATE 2.5mg/0.5 mL neb soln, 2.5 mg, Nebulization, Q6H PRN, Ivon Kelly, NP    montelukast (SINGULAIR) tablet 10 mg, 10 mg, Oral, QHS, Ivon Kelly, NP, 10 mg at 02/11/23 0846    budesonide (PULMICORT) 500 mcg/2 ml nebulizer suspension, 500 mcg, Nebulization, BID RT, Genaro Jordan, , 500 mcg at 02/11/23 0803    guaiFENesin ER (MUCINEX) tablet 1,200 mg, 1,200 mg, Oral, BID, Genaro Jordan, DO, 1,200 mg at 02/11/23 0845    benzonatate (TESSALON) capsule 200 mg, 200 mg, Oral, TID, Genaro Jordan, DO, 200 mg at 02/11/23 0845    insulin glargine (LANTUS) injection 50 Units, 50 Units, SubCUTAneous, QHS, BeckySpring Elvia, NP, 50 Units at 02/10/23 2201    guaiFENesin (ROBITUSSIN) 100 mg/5 mL oral liquid 100 mg, 100 mg, Oral, Q6H PRN, Ivon Kelly, NP, 100 mg at 02/11/23 0849    sodium chloride (NS) flush 5-40 mL, 5-40 mL, IntraVENous, Q8H, Dolly Fernandez MD, 10 mL at 02/11/23 1344    sodium chloride (NS) flush 5-40 mL, 5-40 mL, IntraVENous, PRN, Dolly Fernandez MD    acetaminophen (TYLENOL) tablet 650 mg, 650 mg, Oral, Q6H PRN **OR** acetaminophen (TYLENOL) suppository 650 mg, 650 mg, Rectal, Q6H PRN, Dolly Fernandez MD    polyethylene glycol (MIRALAX) packet 17 g, 17 g, Oral, DAILY PRN, Maciel Mar MD, 17 g at 02/11/23 0849    ondansetron (ZOFRAN ODT) tablet 4 mg, 4 mg, Oral, Q8H PRN **OR** ondansetron (ZOFRAN) injection 4 mg, 4 mg, IntraVENous, Q6H PRN, Dolly Fernandez MD, 4 mg at 02/08/23 2154    enoxaparin (LOVENOX) injection 40 mg, 40 mg, SubCUTAneous, Q12H, Dolly Fernandez MD, 40 mg at 02/11/23 0845    cefTRIAXone (ROCEPHIN) 1 g in sterile water (preservative free) 10 mL IV syringe, 1 g, IntraVENous, Q24H, Genaro Jordan DO, 1 g at 02/11/23 1341    glucose chewable tablet 16 g, 4 Tablet, Oral, PRN, Wesley Fernandez MD    glucagon (GLUCAGEN) injection 1 mg, 1 mg, IntraMUSCular, PRN, Wesley Fernandez MD    dextrose 10% infusion 0-250 mL, 0-250 mL, IntraVENous, PRN, Herman Puga MD       ASSESSMENT:    Mild persistent asthma exacerbation:  -Continue albuterol nebulization, rocephin and azithromycin, steroids.  -Continue to monitor.  -Currently on room air  -Continue singulair  -Pulmonary following  -Outpatient overnight sleep study at discharge     Community-acquired pneumonia:  -Continue ceftriaxone and azithromycin x 7 days total  -Continue steroids     Diabetes mellitus type 2:  -Patient takes Lantus 50 units at nighttime.  -Started the patient on Lantus 20 units at nighttime and sliding scale in the hospital.  -Hold metformin     Morbid obesity:  -BMI 89.69.  -Complicates overall care. -Encouraged weight loss  -Dietary lifestyle modification discussed  -Suspect underlying KRYSTINA    Acute dehydration/hyponatremia  -Gentle IV hydration  -Encourage p.o. intake    Lactic acidosis  -Continue IV hydration  -Continue antibiotics for pneumonia  -Repeat labs in a.m. Full Code  Dvt Prophylaxis Lovenox  GI Prophylaxis not warranted  Dispo: 24-48 hrs pending clinical improvement, case management for home O2 arrangement    Above treatment plan reviewed and discussed with patient in detail at bedside, all questions answered. Care Plan discussed with: Patient/Family    Total time spent with patient: 35 minutes.     Cherlele Solares NP

## 2023-02-11 NOTE — ROUTINE PROCESS
@ 2200  BS:458 .Called  order for 7units Humalog received , also pt based on scheduled order received 50 units Lanus ,no   Extra Insulin  order received

## 2023-02-12 LAB
ALBUMIN SERPL-MCNC: 2.9 G/DL (ref 3.5–5)
ANION GAP SERPL CALC-SCNC: 4 MMOL/L (ref 5–15)
BUN SERPL-MCNC: 17 MG/DL (ref 6–20)
BUN/CREAT SERPL: 19 (ref 12–20)
CA-I BLD-MCNC: 9.6 MG/DL (ref 8.5–10.1)
CHLORIDE SERPL-SCNC: 100 MMOL/L (ref 97–108)
CO2 SERPL-SCNC: 28 MMOL/L (ref 21–32)
CREAT SERPL-MCNC: 0.88 MG/DL (ref 0.55–1.02)
ERYTHROCYTE [DISTWIDTH] IN BLOOD BY AUTOMATED COUNT: 14.6 % (ref 11.5–14.5)
GLUCOSE BLD STRIP.AUTO-MCNC: 283 MG/DL (ref 65–100)
GLUCOSE BLD STRIP.AUTO-MCNC: 286 MG/DL (ref 65–100)
GLUCOSE BLD STRIP.AUTO-MCNC: 307 MG/DL (ref 65–100)
GLUCOSE BLD STRIP.AUTO-MCNC: 345 MG/DL (ref 65–100)
GLUCOSE SERPL-MCNC: 375 MG/DL (ref 65–100)
HCT VFR BLD AUTO: 39.3 % (ref 35–47)
HGB BLD-MCNC: 12.9 G/DL (ref 11.5–16)
LACTATE SERPL-SCNC: 2.8 MMOL/L (ref 0.4–2)
MAGNESIUM SERPL-MCNC: 2.2 MG/DL (ref 1.6–2.4)
MCH RBC QN AUTO: 28.6 PG (ref 26–34)
MCHC RBC AUTO-ENTMCNC: 32.8 G/DL (ref 30–36.5)
MCV RBC AUTO: 87.1 FL (ref 80–99)
NRBC # BLD: 0 K/UL (ref 0–0.01)
NRBC BLD-RTO: 0 PER 100 WBC
PERFORMED BY, TECHID: ABNORMAL
PHOSPHATE SERPL-MCNC: 2.7 MG/DL (ref 2.6–4.7)
PLATELET # BLD AUTO: 315 K/UL (ref 150–400)
PMV BLD AUTO: 10.4 FL (ref 8.9–12.9)
POTASSIUM SERPL-SCNC: 4.8 MMOL/L (ref 3.5–5.1)
RBC # BLD AUTO: 4.51 M/UL (ref 3.8–5.2)
SODIUM SERPL-SCNC: 132 MMOL/L (ref 136–145)
WBC # BLD AUTO: 9.3 K/UL (ref 3.6–11)

## 2023-02-12 PROCEDURE — 74011636637 HC RX REV CODE- 636/637: Performed by: NURSE PRACTITIONER

## 2023-02-12 PROCEDURE — 36415 COLL VENOUS BLD VENIPUNCTURE: CPT

## 2023-02-12 PROCEDURE — 94640 AIRWAY INHALATION TREATMENT: CPT

## 2023-02-12 PROCEDURE — 74011000250 HC RX REV CODE- 250: Performed by: STUDENT IN AN ORGANIZED HEALTH CARE EDUCATION/TRAINING PROGRAM

## 2023-02-12 PROCEDURE — 74011000250 HC RX REV CODE- 250: Performed by: INTERNAL MEDICINE

## 2023-02-12 PROCEDURE — 74011250636 HC RX REV CODE- 250/636: Performed by: STUDENT IN AN ORGANIZED HEALTH CARE EDUCATION/TRAINING PROGRAM

## 2023-02-12 PROCEDURE — 80069 RENAL FUNCTION PANEL: CPT

## 2023-02-12 PROCEDURE — 83605 ASSAY OF LACTIC ACID: CPT

## 2023-02-12 PROCEDURE — 82962 GLUCOSE BLOOD TEST: CPT

## 2023-02-12 PROCEDURE — 74011250637 HC RX REV CODE- 250/637: Performed by: NURSE PRACTITIONER

## 2023-02-12 PROCEDURE — 74011250637 HC RX REV CODE- 250/637: Performed by: INTERNAL MEDICINE

## 2023-02-12 PROCEDURE — 74011250636 HC RX REV CODE- 250/636: Performed by: NURSE PRACTITIONER

## 2023-02-12 PROCEDURE — 74011000250 HC RX REV CODE- 250: Performed by: NURSE PRACTITIONER

## 2023-02-12 PROCEDURE — 65270000029 HC RM PRIVATE

## 2023-02-12 PROCEDURE — 85027 COMPLETE CBC AUTOMATED: CPT

## 2023-02-12 PROCEDURE — 74011250636 HC RX REV CODE- 250/636: Performed by: INTERNAL MEDICINE

## 2023-02-12 PROCEDURE — 83735 ASSAY OF MAGNESIUM: CPT

## 2023-02-12 RX ORDER — FLUCONAZOLE 100 MG/1
150 TABLET ORAL
Status: COMPLETED | OUTPATIENT
Start: 2023-02-12 | End: 2023-02-12

## 2023-02-12 RX ORDER — PREDNISONE 20 MG/1
40 TABLET ORAL
Status: DISCONTINUED | OUTPATIENT
Start: 2023-02-12 | End: 2023-02-14 | Stop reason: HOSPADM

## 2023-02-12 RX ADMIN — IPRATROPIUM BROMIDE AND ALBUTEROL SULFATE 3 ML: 2.5; .5 SOLUTION RESPIRATORY (INHALATION) at 00:07

## 2023-02-12 RX ADMIN — INSULIN GLARGINE 50 UNITS: 100 INJECTION, SOLUTION SUBCUTANEOUS at 21:54

## 2023-02-12 RX ADMIN — IPRATROPIUM BROMIDE AND ALBUTEROL SULFATE 3 ML: 2.5; .5 SOLUTION RESPIRATORY (INHALATION) at 14:40

## 2023-02-12 RX ADMIN — FLUCONAZOLE 150 MG: 100 TABLET ORAL at 09:19

## 2023-02-12 RX ADMIN — INSULIN LISPRO 8 UNITS: 100 INJECTION, SOLUTION INTRAVENOUS; SUBCUTANEOUS at 16:46

## 2023-02-12 RX ADMIN — INSULIN LISPRO 7 UNITS: 100 INJECTION, SOLUTION INTRAVENOUS; SUBCUTANEOUS at 09:20

## 2023-02-12 RX ADMIN — IPRATROPIUM BROMIDE AND ALBUTEROL SULFATE 3 ML: 2.5; .5 SOLUTION RESPIRATORY (INHALATION) at 19:31

## 2023-02-12 RX ADMIN — ENOXAPARIN SODIUM 40 MG: 100 INJECTION SUBCUTANEOUS at 21:54

## 2023-02-12 RX ADMIN — INSULIN LISPRO 7 UNITS: 100 INJECTION, SOLUTION INTRAVENOUS; SUBCUTANEOUS at 12:11

## 2023-02-12 RX ADMIN — AZITHROMYCIN MONOHYDRATE 500 MG: 500 INJECTION, POWDER, LYOPHILIZED, FOR SOLUTION INTRAVENOUS at 01:09

## 2023-02-12 RX ADMIN — BENZONATATE 200 MG: 100 CAPSULE ORAL at 16:45

## 2023-02-12 RX ADMIN — IPRATROPIUM BROMIDE AND ALBUTEROL SULFATE 3 ML: 2.5; .5 SOLUTION RESPIRATORY (INHALATION) at 09:32

## 2023-02-12 RX ADMIN — BENZONATATE 200 MG: 100 CAPSULE ORAL at 09:18

## 2023-02-12 RX ADMIN — SODIUM CHLORIDE, PRESERVATIVE FREE 10 ML: 5 INJECTION INTRAVENOUS at 21:55

## 2023-02-12 RX ADMIN — BUDESONIDE 500 MCG: 0.5 SUSPENSION RESPIRATORY (INHALATION) at 09:32

## 2023-02-12 RX ADMIN — SODIUM CHLORIDE, PRESERVATIVE FREE 10 ML: 5 INJECTION INTRAVENOUS at 05:54

## 2023-02-12 RX ADMIN — ENOXAPARIN SODIUM 40 MG: 100 INJECTION SUBCUTANEOUS at 09:18

## 2023-02-12 RX ADMIN — INSULIN LISPRO 5 UNITS: 100 INJECTION, SOLUTION INTRAVENOUS; SUBCUTANEOUS at 16:49

## 2023-02-12 RX ADMIN — BENZONATATE 200 MG: 100 CAPSULE ORAL at 21:54

## 2023-02-12 RX ADMIN — AMLODIPINE BESYLATE 10 MG: 5 TABLET ORAL at 09:18

## 2023-02-12 RX ADMIN — SODIUM CHLORIDE, PRESERVATIVE FREE 10 ML: 5 INJECTION INTRAVENOUS at 14:48

## 2023-02-12 RX ADMIN — INSULIN LISPRO 8 UNITS: 100 INJECTION, SOLUTION INTRAVENOUS; SUBCUTANEOUS at 12:12

## 2023-02-12 RX ADMIN — MONTELUKAST 10 MG: 10 TABLET, FILM COATED ORAL at 09:19

## 2023-02-12 RX ADMIN — CEFTRIAXONE SODIUM 1 G: 1 INJECTION, POWDER, FOR SOLUTION INTRAMUSCULAR; INTRAVENOUS at 14:48

## 2023-02-12 RX ADMIN — HYDRALAZINE HYDROCHLORIDE 25 MG: 25 TABLET, FILM COATED ORAL at 21:54

## 2023-02-12 RX ADMIN — AZITHROMYCIN MONOHYDRATE 500 MG: 500 INJECTION, POWDER, LYOPHILIZED, FOR SOLUTION INTRAVENOUS at 23:34

## 2023-02-12 RX ADMIN — HYDRALAZINE HYDROCHLORIDE 25 MG: 25 TABLET, FILM COATED ORAL at 09:19

## 2023-02-12 RX ADMIN — SODIUM CHLORIDE 75 ML/HR: 9 INJECTION, SOLUTION INTRAVENOUS at 16:50

## 2023-02-12 RX ADMIN — INSULIN LISPRO 8 UNITS: 100 INJECTION, SOLUTION INTRAVENOUS; SUBCUTANEOUS at 09:19

## 2023-02-12 RX ADMIN — METHYLPREDNISOLONE SODIUM SUCCINATE 40 MG: 40 INJECTION, POWDER, FOR SOLUTION INTRAMUSCULAR; INTRAVENOUS at 05:54

## 2023-02-12 RX ADMIN — BUDESONIDE 500 MCG: 0.5 SUSPENSION RESPIRATORY (INHALATION) at 19:32

## 2023-02-12 RX ADMIN — IPRATROPIUM BROMIDE AND ALBUTEROL SULFATE 3 ML: 2.5; .5 SOLUTION RESPIRATORY (INHALATION) at 19:30

## 2023-02-12 RX ADMIN — HYDRALAZINE HYDROCHLORIDE 25 MG: 25 TABLET, FILM COATED ORAL at 16:45

## 2023-02-12 RX ADMIN — PREDNISONE 40 MG: 20 TABLET ORAL at 09:19

## 2023-02-12 RX ADMIN — INSULIN LISPRO 5 UNITS: 100 INJECTION, SOLUTION INTRAVENOUS; SUBCUTANEOUS at 21:54

## 2023-02-12 NOTE — PROGRESS NOTES
24 Hour chart check complete. No signed and held orders. now resolved/CHEST DISCOMFORT/SHORTNESS OF BREATH

## 2023-02-12 NOTE — PROGRESS NOTES
Problem: Pain  Goal: *Control of Pain  Outcome: Progressing Towards Goal     Problem: Patient Education: Go to Patient Education Activity  Goal: Patient/Family Education  Outcome: Progressing Towards Goal     Problem: Impaired Skin Integrity/Pressure Injury Treatment  Goal: *Improvement of Existing Pressure Injury  Outcome: Progressing Towards Goal  Goal: *Prevention of pressure injury  Description: Document Maurilio Scale and appropriate interventions in the flowsheet.   Outcome: Progressing Towards Goal  Note: Pressure Injury Interventions:  Sensory Interventions: Minimize linen layers, Keep linens dry and wrinkle-free                   Nutrition Interventions: Document food/fluid/supplement intake, Offer support with meals,snacks and hydration                     Problem: Patient Education: Go to Patient Education Activity  Goal: Patient/Family Education  Outcome: Progressing Towards Goal

## 2023-02-12 NOTE — PROGRESS NOTES
Pulmonology and Critical Care Progress Note    Subjective:     2/10/2023: Patient seen and examined in her room on the floor this morning, no acute events overnight. Definitely feels better today from a breathing standpoint, but still feels like she needs another 24 hours. Labs are pending today, most recent lactate was still 2.6, repeat pending. COVID-19 testing and mycoplasma screen both negative, blood sugar still in the 300s, primary team managing glycemic control. Come down on Solu-Medrol 40 mg IV every 8 hours, otherwise continue current antibiotics for 7 days and current nebulizer therapies. TTE completed showing an EF of 55 to 70% with a normal RV, low suspicion for pulmonary hypertension. Patient completed an overnight oximetry test last night and desaturated while sleeping less than 90% for 4 hours and 23 minutes, desaturated less than 88% for 29 minutes. We will consult case management to set up nocturnal home O2 prior to setting up an appointment with me in 2 to 4 weeks for KRYSTINA evaluation. Patient seen and examined in the room today. She is sitting comfortably in bed. On room air. Patient stated that she cannot go home because apparently the [employer] cannot deliver oxygen until Monday. Otherwise she feels better. Some sputum production.           Current Facility-Administered Medications   Medication Dose Route Frequency Provider Last Rate Last Admin    predniSONE (DELTASONE) tablet 40 mg  40 mg Oral DAILY WITH Ruiz Hamilton NP   40 mg at 02/12/23 0919    benzocaine-menthoL (CHLORASEPTIC) lozenge 1 Lozenge  1 Lozenge Mucous Membrane PRN Maisha Shoemaker NP   1 Lozenge at 02/11/23 1542    hydrALAZINE (APRESOLINE) tablet 25 mg  25 mg Oral TID Maisha Shoemaker NP   25 mg at 02/12/23 0919    0.9% sodium chloride infusion  75 mL/hr IntraVENous CONTINUOUS Maisha Shoemaker NP 75 mL/hr at 02/11/23 1340 75 mL/hr at 02/11/23 1340    azithromycin (ZITHROMAX) 500 mg in 0.9% sodium chloride 250 mL ([id number])  500 mg IntraVENous Q24H Krishna Jordan Ramal  mL/hr at 02/12/23 0109 500 mg at 02/12/23 0109    amLODIPine (NORVASC) tablet 10 mg  10 mg Oral DAILY Lani Sloop, NP   10 mg at 02/12/23 5951    insulin lispro (HUMALOG) injection 8 Units  8 Units SubCUTAneous TID WITH MEALS Lani Sloop, NP   8 Units at 02/12/23 1212    insulin lispro (HUMALOG) injection   SubCUTAneous AC&HS Lani Sloop, NP   7 Units at 02/12/23 1211    albuterol-ipratropium (DUO-NEB) 2.5 MG-0.5 MG/3 ML  3 mL Nebulization Q6H RT Lani Sloop, NP   3 mL at 02/12/23 0932    albuterol CONCENTRATE 2.5mg/0.5 mL neb soln  2.5 mg Nebulization Q6H PRN Lani Sloop, NP        montelukast (SINGULAIR) tablet 10 mg  10 mg Oral QHS Lani Sloop, NP   10 mg at 02/12/23 0919    budesonide (PULMICORT) 500 mcg/2 ml nebulizer suspension  500 mcg Nebulization BID RT Genaro Jordan DO   500 mcg at 02/12/23 0932    guaiFENesin ER (MUCINEX) tablet 1,200 mg  1,200 mg Oral BID Genaro Jordan, DO   1,200 mg at 02/11/23 2158    benzonatate (TESSALON) capsule 200 mg  200 mg Oral TID Genaro Jordan DO   200 mg at 02/12/23 0918    insulin glargine (LANTUS) injection 50 Units  50 Units SubCUTAneous QHS Lani Sloop, NP   50 Units at 02/11/23 2200    guaiFENesin (ROBITUSSIN) 100 mg/5 mL oral liquid 100 mg  100 mg Oral Q6H PRN Lani Sloop, NP   100 mg at 02/11/23 0849    sodium chloride (NS) flush 5-40 mL  5-40 mL IntraVENous Q8H Regulo Fernandez MD   10 mL at 02/12/23 0554    sodium chloride (NS) flush 5-40 mL  5-40 mL IntraVENous PRN Ramiro Rawls MD        acetaminophen (TYLENOL) tablet 650 mg  650 mg Oral Q6H PRN Ramiro Rawls MD        Or    acetaminophen (TYLENOL) suppository 650 mg  650 mg Rectal Q6H PRN Regulo Fernandez MD        polyethylene glycol (MIRALAX) packet 17 g  17 g Oral DAILY PRN Ramiro Rawls MD   17 g at 02/11/23 0849    ondansetron (ZOFRAN ODT) tablet 4 mg  4 mg Oral Q8H PRN Ramiro Rawls MD Or    ondansetron (ZOFRAN) injection 4 mg  4 mg IntraVENous Q6H PRN Giorgi Chou MD   4 mg at 23 2154    enoxaparin (LOVENOX) injection 40 mg  40 mg SubCUTAneous Q12H Giorgi Chou MD   40 mg at 23 7592    cefTRIAXone (ROCEPHIN) 1 g in sterile water (preservative free) 10 mL IV syringe  1 g IntraVENous Q24H Nikki GenaroDO   1 g at 23 1341    glucose chewable tablet 16 g  4 Tablet Oral PRN Giorgi Chou MD        glucagon (GLUCAGEN) injection 1 mg  1 mg IntraMUSCular PRN Giorgi Chou MD        dextrose 10% infusion 0-250 mL  0-250 mL IntraVENous PRN Giorgi Chou MD            No Known Allergies    Review of Systems:  A comprehensive review of systems was negative except for that written in the HPI. Objective:     Blood pressure (!) 154/97, pulse 77, temperature 98.2 °F (36.8 °C), resp. rate 20, height 5' 5\" (1.651 m), weight (!) 161.9 kg (357 lb), last menstrual period 2022, SpO2 95 %, not currently breastfeeding. Temp (24hrs), Av.1 °F (36.7 °C), Min:97.7 °F (36.5 °C), Max:98.4 °F (36.9 °C)      Intake and Output:  Current Shift: No intake/output data recorded. Last 3 Shifts: 02/10 1901 -  0700  In: 863.8 [I.V.:863.8]  Out: -     Physical Exam:   General appearance: alert, cooperative, no distress, appears stated age, morbidly obese  Head: Normocephalic, without obvious abnormality, atraumatic  Eyes: negative  Neck: supple, symmetrical, trachea midline and no adenopathy  Lungs: Expiratory wheezing bilaterally is improved, no obvious rhonchi, currently on room air  Heart: regular rate and rhythm, S1, S2 normal, no murmur, click, rub or gallop  Abdomen: soft, non-tender.  Bowel sounds normal. No masses,  no organomegaly  Extremities: extremities normal, atraumatic, no cyanosis or edema  Pulses: 2+ and symmetric  Skin: Skin color, texture, turgor normal. No rashes or lesions  Lymph nodes: Cervical, supraclavicular, and axillary nodes normal.  Neurologic: Grossly normal, alert and oriented x3    Additional comments:None    Lab/Data Review: All lab results for the last 24 hours reviewed.   Recent Results (from the past 24 hour(s))   GLUCOSE, POC    Collection Time: 02/11/23  4:32 PM   Result Value Ref Range    Glucose (POC) 339 (H) 65 - 100 mg/dL    Performed by Bill Hooper    GLUCOSE, POC    Collection Time: 02/11/23  9:40 PM   Result Value Ref Range    Glucose (POC) 344 (H) 65 - 100 mg/dL    Performed by Hillary Francis    GLUCOSE, POC    Collection Time: 02/12/23  8:37 AM   Result Value Ref Range    Glucose (POC) 307 (H) 65 - 100 mg/dL    Performed by Colton Thomas    RENAL FUNCTION PANEL    Collection Time: 02/12/23 11:21 AM   Result Value Ref Range    Sodium 132 (L) 136 - 145 mmol/L    Potassium 4.8 3.5 - 5.1 mmol/L    Chloride 100 97 - 108 mmol/L    CO2 28 21 - 32 mmol/L    Anion gap 4 (L) 5 - 15 mmol/L    Glucose 375 (H) 65 - 100 mg/dL    BUN 17 6 - 20 mg/dL    Creatinine 0.88 0.55 - 1.02 mg/dL    BUN/Creatinine ratio 19 12 - 20      eGFR >60 >60 ml/min/1.73m2    Calcium 9.6 8.5 - 10.1 mg/dL    Phosphorus 2.7 2.6 - 4.7 mg/dL    Albumin 2.9 (L) 3.5 - 5.0 g/dL   MAGNESIUM    Collection Time: 02/12/23 11:21 AM   Result Value Ref Range    Magnesium 2.2 1.6 - 2.4 mg/dL   CBC W/O DIFF    Collection Time: 02/12/23 11:21 AM   Result Value Ref Range    WBC 9.3 3.6 - 11.0 K/uL    RBC 4.51 3.80 - 5.20 M/uL    HGB 12.9 11.5 - 16.0 g/dL    HCT 39.3 35.0 - 47.0 %    MCV 87.1 80.0 - 99.0 FL    MCH 28.6 26.0 - 34.0 PG    MCHC 32.8 30.0 - 36.5 g/dL    RDW 14.6 (H) 11.5 - 14.5 %    PLATELET 333 221 - 073 K/uL    MPV 10.4 8.9 - 12.9 FL    NRBC 0.0 0.0  WBC    ABSOLUTE NRBC 0.00 0.00 - 0.01 K/uL   LACTIC ACID    Collection Time: 02/12/23 11:21 AM   Result Value Ref Range    Lactic acid 2.8 (HH) 0.4 - 2.0 mmol/L   GLUCOSE, POC    Collection Time: 02/12/23 11:59 AM   Result Value Ref Range    Glucose (POC) 345 (H) 65 - 100 mg/dL    Performed by Colton Thomas Chest X-Ray:   CXR Results  (Last 48 hours)      None              CT imaging:  CT Results  (Last 48 hours)      None            PFTs:   PFT Results  (Last 3 results in the past 10 years)      None              Assessment:     Patient is a 40-year-old morbidly obese -American female with a history of insulin-dependent type 2 diabetes mellitus, mild intermittent asthma, and hypertension who presented to the hospital with increasing shortness of breath and wheezing for the past several days consistent with an acute exacerbation of asthma. Plan:     1.)  Acute hypoxic respiratory failure  -Secondary to acute exacerbation of asthma in the setting of community-acquired pneumonia, targeted therapies as outlined below  -Currently on room air, weaned off for sats greater than 90% on room air  -I will see her in the office in 2 to 4 weeks for outpatient PFTs and asthma management  -Overnight oximetry confirms nocturnal hypoxemia. Case management consultation for supplemental oxygen at nighttime while sleeping, recommend 2 L nasal cannula. -Plan needs an KRYSTINA evaluation as well, definitely needs significant weight loss    2.)  Acute exacerbation of asthma  -Suspect secondary to community-acquired pneumonia, targeted therapies as outlined below  -Continue antibiotics as below  -Solu-Medrol switch to p.o. prednisone. Agree. -DuoNeb nebulizers every 6 hours and Pulmicort nebs twice daily  -Overnight oximetry confirms nocturnal hypoxemia.   Case management consultation for supplemental oxygen at nighttime while sleeping.  -Continue home Singulair    3.)  Community-acquired pneumonia  -Very mild infiltrates in the right lower lobe, despite read on CT scan I do not appreciate much left upper lobe infiltrates, does have mosaicism bilaterally  -Could be impetus for asthma exacerbation, influenza screen negative  -Expanded infectious work-up negative  -Agree with IV Rocephin/azithromycin x7 days    4.)  Lactic acidosis  -Lactate level 3.4 on admission, had come down to 2.6  -Suspect secondary to respiratory muscle acidosis from increased work of breathing    5.)  Morbid obesity  -BMI 59.41, due to excess calories  -Significant weight loss recommended  -Needs obstructive sleep apnea evaluation in the outpatient setting    Patient can be discharged from pulmonary standpoint as discussed above. However awaiting nocturnal O2 2 L to be delivered at home.   Total time spent with patient: 20 minutes      Jaime Cordero MD  [facility] ([facility])  2/12/2023

## 2023-02-12 NOTE — PROGRESS NOTES
Hospitalist Progress Note         DAVON Villar, FNP-C    Daily Progress Note: 2/12/2023    78-year-old lady with past medical history significant for diabetes mellitus type 2, asthma presented to the hospital complaining of shortness of breath, cough with sputum production. Symptoms started 2 days prior to admission. Patient has been experiencing significant shortness of breath, wheezing. Patient used her rescue inhaler but it did not help her. Patient also has been experiencing significant productive cough. Patient presented to the hospital for further evaluation. In the ER, patient was found to be hypoxic requiring supplemental oxygen. Even after providing multiple breathing treatments, steroids, antibiotics patient continued to remain short of breath requiring supplemental oxygen. Internal medicine was consulted for admission. We were asked to admit for work up and evaluation of the above problems. Patient started on Solu-Medrol, IV Rocephin and azithromycin. Start singulair. Consult pulmonary. Pulmonary proximity shows that patient O2 sats was less than 90% over 4 hours. Patient O2 sats was less than 88% for 29.3 minutes. DC IV steroids, transition to PO steroids. Discharge delayed due to awaiting home nocturnal O2. Subjective:   Subjective   Patient examined alert and oriented sitting on bedside. Patient currently maintaining sats on room air. No acute distress noted on examination. Review of Systems:   Review of Systems   Constitutional:  Negative for chills and fever. Respiratory:  Positive for cough and shortness of breath. Dyspnea on exertion   Cardiovascular:  Negative for chest pain. Gastrointestinal:  Negative for abdominal pain, nausea and vomiting. Genitourinary:  Negative for dysuria.        Objective:   Objective      Vitals:  Patient Vitals for the past 12 hrs:   Temp Pulse Resp BP SpO2   02/12/23 0933 -- -- -- -- 95 %   02/12/23 0840 98.2 °F (36.8 °C) 77 20 (!) 154/97 94 %   02/12/23 0432 98.1 °F (36.7 °C) 85 22 (!) 160/90 95 %   02/12/23 0400 -- 72 -- -- --   02/12/23 0000 -- (!) 107 -- -- --          Physical Exam  Vitals and nursing note reviewed. Constitutional:       Appearance: She is obese. Cardiovascular:      Rate and Rhythm: Normal rate. Pulmonary:      Comments: Room air  Abdominal:      General: Bowel sounds are normal.   Skin:     General: Skin is warm and dry. Neurological:      Mental Status: She is alert and oriented to person, place, and time. Lab Results:  Recent Results (from the past 24 hour(s))   GLUCOSE, POC    Collection Time: 02/11/23 11:35 AM   Result Value Ref Range    Glucose (POC) 228 (H) 65 - 100 mg/dL    Performed by Janak Paredes    GLUCOSE, POC    Collection Time: 02/11/23  4:32 PM   Result Value Ref Range    Glucose (POC) 339 (H) 65 - 100 mg/dL    Performed by Janak Paredes    GLUCOSE, POC    Collection Time: 02/11/23  9:40 PM   Result Value Ref Range    Glucose (POC) 344 (H) 65 - 100 mg/dL    Performed by Torie Baker    GLUCOSE, POC    Collection Time: 02/12/23  8:37 AM   Result Value Ref Range    Glucose (POC) 307 (H) 65 - 100 mg/dL    Performed by Clemente Nation       Results       Procedure Component Value Units Date/Time    LEGIONELLA PNEUMOPHILA Beaulah Reining URINE [[id number]] Collected: 02/09/23 1455    Order Status: Sent Specimen: Urine Updated: 02/09/23 1504    CULTURE, RESPIRATORY/SPUTUM/BRONCH Betito Relic STAIN [[id number]] Collected: 02/09/23 1455    Order Status: Completed Specimen: Sputum Updated: 02/12/23 0937     Special Requests: No Special Requests        GRAM STAIN Rare WBCs seen               Rare Epithelial cells seen            No organisms seen        Culture result:        Moderate Normal respiratory pete          COVID-19 RAPID TEST [[id number]] Collected: 02/09/23 1244    Order Status: Completed Specimen: Nasopharyngeal Updated: 02/09/23 1322     COVID-19 rapid test Not Detected        Comment: Rapid Abbott ID Now   The specimen is NEGATIVE for SARS-CoV2, the novel coronavirus associated with COVID-19. A negative result does not rule out COVID-19. This test has been authorized by the FDA under an Emergency Use Authorization (EUA) for use by authorized laboratories. Fact sheet for Healthcare Providers:  http://www.cely.melo/ Fact sheet for Patients: http://www.cely.melo/   Methodology: Isothermal Nucleic Acid Amplification       MYCOPLASMA AB, IGM [[id number]] Collected: 02/09/23 1122    Order Status: Completed Specimen: Serum Updated: 02/09/23 2325     Mycoplasma Ab, IgM NONREACTIVE       CULTURE, BLOOD, PAIRED [[id number]] Collected: 02/06/23 2328    Order Status: Completed Specimen: Blood Updated: 02/12/23 0904     Special Requests: No Special Requests        Culture result: No growth 4 days       INFLUENZA A & B AG (RAPID TEST) [[id number]] Collected: 02/06/23 1933    Order Status: Completed Specimen: Nasal washing Updated: 02/06/23 1953     Influenza A Antigen Negative        Influenza B Antigen Negative       INFLUENZA A & B AG (RAPID TEST) [[id number]] Collected: 02/06/23 1930    Order Status: Canceled Specimen: Nasopharyngeal from Nasal washing              Diagnostic Images:      CTA CHEST W OR W WO CONT   Final Result   No evidence of acute pulmonary embolus. Airspace disease in the right lower lobe   and left upper lobe suggests pneumonia. Indeterminate enlarged superior   mediastinal lymph node; CT follow-up is recommended. Enlarged main pulmonary   artery suggests pulmonary hypertension. XR CHEST PORT   Final Result   1.  No acute disease                    Current Medications:    Current Facility-Administered Medications:     predniSONE (DELTASONE) tablet 40 mg, 40 mg, Oral, DAILY WITH BREAKFAST, Lamar Pena, NP, 40 mg at 02/12/23 0919    benzocaine-menthoL (CHLORASEPTIC) lozenge 1 Lozenge, 1 Lozenge, Mucous Membrane, PRN, Claudette Barros NP, 1 Lozenge at 02/11/23 1542    hydrALAZINE (APRESOLINE) tablet 25 mg, 25 mg, Oral, TID, Tevin Grandchild, NP, 25 mg at 02/12/23 0919    0.9% sodium chloride infusion, 75 mL/hr, IntraVENous, CONTINUOUS, Teivn Grandchild, NP, Last Rate: 75 mL/hr at 02/11/23 1340, 75 mL/hr at 02/11/23 1340    azithromycin (ZITHROMAX) 500 mg in 0.9% sodium chloride 250 mL (Roen2Cvz), 500 mg, IntraVENous, Q24H, Genaro Jordan, , Last Rate: 250 mL/hr at 02/12/23 0109, 500 mg at 02/12/23 0109    amLODIPine (NORVASC) tablet 10 mg, 10 mg, Oral, DAILY, Becky, Charity [place], NP, 10 mg at 02/12/23 0918    insulin lispro (HUMALOG) injection 8 Units, 8 Units, SubCUTAneous, TID WITH MEALS, Tevin Grandchild, NP, 8 Units at 02/12/23 0919    insulin lispro (HUMALOG) injection, , SubCUTAneous, AC&HS, Tevin Grandchild, NP, 7 Units at 02/12/23 0920    albuterol-ipratropium (DUO-NEB) 2.5 MG-0.5 MG/3 ML, 3 mL, Nebulization, Q6H RT, Tevin Grandchild, NP, 3 mL at 02/12/23 0932    albuterol CONCENTRATE 2.5mg/0.5 mL neb soln, 2.5 mg, Nebulization, Q6H PRN, Tevin Grandchild, NP    montelukast (SINGULAIR) tablet 10 mg, 10 mg, Oral, QHS, Becky, Charity [place], NP, 10 mg at 02/12/23 0919    budesonide (PULMICORT) 500 mcg/2 ml nebulizer suspension, 500 mcg, Nebulization, BID RT, Genaro Jordan, , 500 mcg at 02/12/23 0932    guaiFENesin ER (MUCINEX) tablet 1,200 mg, 1,200 mg, Oral, BID, Genaro Jordan, DO, 1,200 mg at 02/11/23 2158    benzonatate (TESSALON) capsule 200 mg, 200 mg, Oral, TID, Genaro Jordan, DO, 200 mg at 02/12/23 0918    insulin glargine (LANTUS) injection 50 Units, 50 Units, SubCUTAneous, QHS, Lamar Pena NP, 50 Units at 02/11/23 2200    guaiFENesin (ROBITUSSIN) 100 mg/5 mL oral liquid 100 mg, 100 mg, Oral, Q6H PRN, Tevin Thacker NP, 100 mg at 02/11/23 0849    sodium chloride (NS) flush 5-40 mL, 5-40 mL, IntraVENous, Q8H, Grace Fernandez MD, 10 mL at 02/12/23 0554    sodium chloride (NS) flush 5-40 mL, 5-40 mL, IntraVENous, PRN, Mercedez Montoya MD acetaminophen (TYLENOL) tablet 650 mg, 650 mg, Oral, Q6H PRN **OR** acetaminophen (TYLENOL) suppository 650 mg, 650 mg, Rectal, Q6H PRN, Belen Fernandez MD    polyethylene glycol (MIRALAX) packet 17 g, 17 g, Oral, DAILY PRN, Belen Fernandez MD, 17 g at 02/11/23 0849    ondansetron (ZOFRAN ODT) tablet 4 mg, 4 mg, Oral, Q8H PRN **OR** ondansetron (ZOFRAN) injection 4 mg, 4 mg, IntraVENous, Q6H PRN, Belen Britton MD, 4 mg at 02/08/23 2154    enoxaparin (LOVENOX) injection 40 mg, 40 mg, SubCUTAneous, Q12H, Belen Fernandez MD, 40 mg at 02/12/23 6627    cefTRIAXone (ROCEPHIN) 1 g in sterile water (preservative free) 10 mL IV syringe, 1 g, IntraVENous, Q24H, Genaro Jordan DO, 1 g at 02/11/23 1341    glucose chewable tablet 16 g, 4 Tablet, Oral, PRN, Belen Fernandez MD    glucagon (GLUCAGEN) injection 1 mg, 1 mg, IntraMUSCular, PRN, Federico Roth MD    dextrose 10% infusion 0-250 mL, 0-250 mL, IntraVENous, PRN, Federico Roth MD       ASSESSMENT:    Mild persistent asthma exacerbation:  -Continue albuterol nebulization, rocephin and azithromycin, steroids.  -Continue to monitor.  -Currently on room air  -Continue singulair  -Pulmonary following  -Outpatient overnight sleep study at discharge     Community-acquired pneumonia:  -Continue ceftriaxone and azithromycin x 7 days total  -Continue steroids, transitioned to PO prednisone     Diabetes mellitus type 2:  -Patient takes Lantus 50 units at nighttime.  -Started the patient on Lantus 20 units at nighttime and sliding scale in the hospital.  -Hold metformin     Morbid obesity:  -BMI 28.56.  -Complicates overall care  -Encouraged weight loss  -Dietary lifestyle modification discussed  -Suspect underlying KRYSTINA    Acute dehydration/hyponatremia  -Gentle IV hydration  -Encourage p.o. intake    Lactic acidosis  -Continue IV hydration  -Continue antibiotics for pneumonia  -Repeat labs in a.m.       Full Code  Dvt Prophylaxis Lovenox  GI Prophylaxis not warranted  Dispo: 24 hrs pending case management for home O2 arrangement    Above treatment plan reviewed and discussed with patient in detail at bedside, all questions answered. Care Plan discussed with: Patient/Family    Total time spent with patient: 35 minutes.     Kvng Cardona NP

## 2023-02-13 LAB
ERYTHROCYTE [DISTWIDTH] IN BLOOD BY AUTOMATED COUNT: 14.6 % (ref 11.5–14.5)
GLUCOSE BLD STRIP.AUTO-MCNC: 118 MG/DL (ref 65–100)
GLUCOSE BLD STRIP.AUTO-MCNC: 158 MG/DL (ref 65–100)
GLUCOSE BLD STRIP.AUTO-MCNC: 88 MG/DL (ref 65–100)
GLUCOSE BLD STRIP.AUTO-MCNC: 91 MG/DL (ref 65–100)
HCT VFR BLD AUTO: 37.9 % (ref 35–47)
HGB BLD-MCNC: 12.5 G/DL (ref 11.5–16)
L PNEUMO1 AG UR QL IA: NEGATIVE
MCH RBC QN AUTO: 28.8 PG (ref 26–34)
MCHC RBC AUTO-ENTMCNC: 33 G/DL (ref 30–36.5)
MCV RBC AUTO: 87.3 FL (ref 80–99)
NRBC # BLD: 0.02 K/UL (ref 0–0.01)
NRBC BLD-RTO: 0.2 PER 100 WBC
PERFORMED BY, TECHID: ABNORMAL
PERFORMED BY, TECHID: ABNORMAL
PERFORMED BY, TECHID: NORMAL
PERFORMED BY, TECHID: NORMAL
PLATELET # BLD AUTO: 307 K/UL (ref 150–400)
PMV BLD AUTO: 10.6 FL (ref 8.9–12.9)
RBC # BLD AUTO: 4.34 M/UL (ref 3.8–5.2)
SPECIMEN SOURCE: NORMAL
WBC # BLD AUTO: 12.6 K/UL (ref 3.6–11)

## 2023-02-13 PROCEDURE — 94640 AIRWAY INHALATION TREATMENT: CPT

## 2023-02-13 PROCEDURE — 74011000250 HC RX REV CODE- 250: Performed by: INTERNAL MEDICINE

## 2023-02-13 PROCEDURE — 36415 COLL VENOUS BLD VENIPUNCTURE: CPT

## 2023-02-13 PROCEDURE — 74011250636 HC RX REV CODE- 250/636: Performed by: INTERNAL MEDICINE

## 2023-02-13 PROCEDURE — 74011250637 HC RX REV CODE- 250/637: Performed by: NURSE PRACTITIONER

## 2023-02-13 PROCEDURE — 74011000250 HC RX REV CODE- 250: Performed by: NURSE PRACTITIONER

## 2023-02-13 PROCEDURE — 74011250636 HC RX REV CODE- 250/636: Performed by: NURSE PRACTITIONER

## 2023-02-13 PROCEDURE — 77010033678 HC OXYGEN DAILY

## 2023-02-13 PROCEDURE — 74011000250 HC RX REV CODE- 250: Performed by: STUDENT IN AN ORGANIZED HEALTH CARE EDUCATION/TRAINING PROGRAM

## 2023-02-13 PROCEDURE — 82962 GLUCOSE BLOOD TEST: CPT

## 2023-02-13 PROCEDURE — 74011250637 HC RX REV CODE- 250/637: Performed by: INTERNAL MEDICINE

## 2023-02-13 PROCEDURE — 74011636637 HC RX REV CODE- 636/637: Performed by: NURSE PRACTITIONER

## 2023-02-13 PROCEDURE — 74011250636 HC RX REV CODE- 250/636: Performed by: STUDENT IN AN ORGANIZED HEALTH CARE EDUCATION/TRAINING PROGRAM

## 2023-02-13 PROCEDURE — 65270000029 HC RM PRIVATE

## 2023-02-13 PROCEDURE — 85027 COMPLETE CBC AUTOMATED: CPT

## 2023-02-13 RX ORDER — IPRATROPIUM BROMIDE AND ALBUTEROL SULFATE 2.5; .5 MG/3ML; MG/3ML
3 SOLUTION RESPIRATORY (INHALATION)
Status: DISCONTINUED | OUTPATIENT
Start: 2023-02-13 | End: 2023-02-14 | Stop reason: HOSPADM

## 2023-02-13 RX ADMIN — INSULIN LISPRO 2 UNITS: 100 INJECTION, SOLUTION INTRAVENOUS; SUBCUTANEOUS at 08:41

## 2023-02-13 RX ADMIN — CEFTRIAXONE SODIUM 1 G: 1 INJECTION, POWDER, FOR SOLUTION INTRAMUSCULAR; INTRAVENOUS at 16:32

## 2023-02-13 RX ADMIN — INSULIN LISPRO 8 UNITS: 100 INJECTION, SOLUTION INTRAVENOUS; SUBCUTANEOUS at 16:27

## 2023-02-13 RX ADMIN — BUDESONIDE 500 MCG: 0.5 SUSPENSION RESPIRATORY (INHALATION) at 08:09

## 2023-02-13 RX ADMIN — BENZONATATE 200 MG: 100 CAPSULE ORAL at 16:27

## 2023-02-13 RX ADMIN — AMLODIPINE BESYLATE 10 MG: 5 TABLET ORAL at 08:40

## 2023-02-13 RX ADMIN — IPRATROPIUM BROMIDE AND ALBUTEROL SULFATE 3 ML: 2.5; .5 SOLUTION RESPIRATORY (INHALATION) at 02:57

## 2023-02-13 RX ADMIN — ENOXAPARIN SODIUM 40 MG: 100 INJECTION SUBCUTANEOUS at 08:40

## 2023-02-13 RX ADMIN — SODIUM CHLORIDE, PRESERVATIVE FREE 10 ML: 5 INJECTION INTRAVENOUS at 22:01

## 2023-02-13 RX ADMIN — SODIUM CHLORIDE, PRESERVATIVE FREE 10 ML: 5 INJECTION INTRAVENOUS at 16:37

## 2023-02-13 RX ADMIN — SODIUM CHLORIDE, PRESERVATIVE FREE 10 ML: 5 INJECTION INTRAVENOUS at 06:47

## 2023-02-13 RX ADMIN — HYDRALAZINE HYDROCHLORIDE 25 MG: 25 TABLET, FILM COATED ORAL at 16:27

## 2023-02-13 RX ADMIN — MONTELUKAST 10 MG: 10 TABLET, FILM COATED ORAL at 08:40

## 2023-02-13 RX ADMIN — IPRATROPIUM BROMIDE AND ALBUTEROL SULFATE 3 ML: 2.5; .5 SOLUTION RESPIRATORY (INHALATION) at 08:09

## 2023-02-13 RX ADMIN — BUDESONIDE 500 MCG: 0.5 SUSPENSION RESPIRATORY (INHALATION) at 21:07

## 2023-02-13 RX ADMIN — SODIUM CHLORIDE 75 ML/HR: 9 INJECTION, SOLUTION INTRAVENOUS at 23:27

## 2023-02-13 RX ADMIN — BENZONATATE 200 MG: 100 CAPSULE ORAL at 08:39

## 2023-02-13 RX ADMIN — HYDRALAZINE HYDROCHLORIDE 25 MG: 25 TABLET, FILM COATED ORAL at 21:31

## 2023-02-13 RX ADMIN — ENOXAPARIN SODIUM 40 MG: 100 INJECTION SUBCUTANEOUS at 21:31

## 2023-02-13 RX ADMIN — INSULIN LISPRO 8 UNITS: 100 INJECTION, SOLUTION INTRAVENOUS; SUBCUTANEOUS at 08:40

## 2023-02-13 RX ADMIN — AZITHROMYCIN MONOHYDRATE 500 MG: 500 INJECTION, POWDER, LYOPHILIZED, FOR SOLUTION INTRAVENOUS at 23:31

## 2023-02-13 RX ADMIN — HYDRALAZINE HYDROCHLORIDE 25 MG: 25 TABLET, FILM COATED ORAL at 08:39

## 2023-02-13 RX ADMIN — SODIUM CHLORIDE 75 ML/HR: 9 INJECTION, SOLUTION INTRAVENOUS at 07:16

## 2023-02-13 RX ADMIN — INSULIN GLARGINE 50 UNITS: 100 INJECTION, SOLUTION SUBCUTANEOUS at 21:31

## 2023-02-13 NOTE — PROGRESS NOTES
Comprehensive Nutrition Assessment    Type and Reason for Visit: Initial, RD nutrition re-screen/LOS    Nutrition Recommendations/Plan:   Continue current 3carb diet   Prune juice x2/d   Monitor and document all PO intake and Bms in I/Os     Malnutrition Assessment:  Malnutrition Status:  Insufficient data (02/13/23 1124)    Context:  Acute illness     Findings of the 6 clinical characteristics of malnutrition:   Energy Intake:  Unable to assess  Weight Loss:  No significant weight loss     Body Fat Loss:  Unable to assess,     Muscle Mass Loss:  Unable to assess,    Fluid Accumulation:  No significant fluid accumulation,     Strength:  Not performed     Nutrition Assessment:    Pt admitted for SOB, cough, and headache, remains inpt for tx and mgmt of multifocal PNA and asthma exacerbation. Per overnight oximetry test, pt req nocturnal home O2. UTO nutr/wt hx. Per EMR, 23#x1yr (6%, insignificant). UTO current intakes, pt currently on reg 3carb diet, cnt. Will add prune juice d/t constipation. Labs: gluc 158-458, Na 132, alb 2.9. Meds: amlodipine, tessalon, azithroymycin, lovenox, apresoline, lantus, humalog, singulair. Nutrition Related Findings:    NFPE deferred. No n/v/d nor c/s issues noted. +c, last BM 2/7 per RN. No edema. Wound Type: None    Current Nutrition Intake & Therapies:  Average Meal Intake: Unable to assess  Average Supplement Intake: None ordered  ADULT DIET Regular; 3 carb choices (45 gm/meal)    Anthropometric Measures:  Height: 5' 5\" (165.1 cm)  Ideal Body Weight (IBW): 125 lbs (57 kg)     Current Body Wt:  161.9 kg (356 lb 14.8 oz), 285.5 % IBW.  Stated  Current BMI (kg/m2): 59.4        Weight Adjustment: No adjustment                 BMI Category: Obese class 3 (BMI 40.0 or greater)    Estimated Daily Nutrient Needs:  Energy Requirements Based On: Formula  Weight Used for Energy Requirements: Current  Energy (kcal/day): 2271kcals/day (MSJx1 for obesity)  Weight Used for Protein Requirements: Ideal  Protein (g/day): 46-57g/day (.8-1g/kg)  Method Used for Fluid Requirements: 1 ml/kcal  Fluid (ml/day): 2271mL    Nutrition Diagnosis:   Overweight/obesity related to excessive energy intake as evidenced by BMI    Nutrition Interventions:   Food and/or Nutrient Delivery: Continue current diet  Nutrition Education/Counseling: Education needed  Coordination of Nutrition Care: No recommendation at this time       Goals:     Goals: by next RD assessment, PO intake 50% or greater     Nutrition Monitoring and Evaluation:   Behavioral-Environmental Outcomes: Readiness for change  Food/Nutrient Intake Outcomes: Diet advancement/tolerance, Food and nutrient intake  Physical Signs/Symptoms Outcomes: Weight, Constipation, Meal time behavior    Discharge Planning:    Recommend pursue outpatient nutrition counseling, Continue current diet, Recommend pursue outpatient diabetes education (Obesity)    Ben Titus  Contact:[phone]

## 2023-02-13 NOTE — PROGRESS NOTES
Problem: Pain  Goal: *Control of Pain  Outcome: Progressing Towards Goal     Problem: Patient Education: Go to Patient Education Activity  Goal: Patient/Family Education  Outcome: Progressing Towards Goal     Problem: Impaired Skin Integrity/Pressure Injury Treatment  Goal: *Improvement of Existing Pressure Injury  Outcome: Progressing Towards Goal  Goal: *Prevention of pressure injury  Description: Document Maurilio Scale and appropriate interventions in the flowsheet.   Outcome: Progressing Towards Goal  Note: Pressure Injury Interventions:  Sensory Interventions: Minimize linen layers                   Nutrition Interventions: Document food/fluid/supplement intake, Offer support with meals,snacks and hydration                     Problem: Patient Education: Go to Patient Education Activity  Goal: Patient/Family Education  Outcome: Progressing Towards Goal     Problem: General Medical Care Plan  Goal: *Vital signs within specified parameters  Outcome: Progressing Towards Goal  Goal: *Labs within defined limits  Outcome: Progressing Towards Goal  Goal: *Absence of infection signs and symptoms  Outcome: Progressing Towards Goal  Goal: *Optimal pain control at patient's stated goal  Outcome: Progressing Towards Goal  Goal: *Skin integrity maintained  Outcome: Progressing Towards Goal  Goal: *Fluid volume balance  Outcome: Progressing Towards Goal  Goal: *Optimize nutritional status  Outcome: Progressing Towards Goal  Goal: *Anxiety reduced or absent  Outcome: Progressing Towards Goal  Goal: *Progressive mobility and function (eg: ADL's)  Outcome: Progressing Towards Goal     Problem: Patient Education: Go to Patient Education Activity  Goal: Patient/Family Education  Outcome: Progressing Towards Goal

## 2023-02-13 NOTE — PROGRESS NOTES
DC Plan: [address] for nocturnal oxygen    Cm called [phone]. Cm notified [facility] of discharge today and asked about delivery for nocturnal oxygen. [facility] wanted to know if someone was home right now to receive the equipment. CM placed [facility] on hold. CM met with pt at the bedside. Pt called someone on her cell phone. Someone is in the home right now. Cm spoke with [facility] and informed them someone is home right now. [facility] will call writer back once the equipment is delivered to the home.     ___________________________________________________    [id number]    Cm received a message via [employer] from [facility] with [facility] indicating they will need new testing. [facility] indicated they cannot deliver equipment until they receive new testing. Overnight split study testing is only good for 48 hrs. ERNESTINA spoke with Conor Wren (VINICIO) regarding pt's oxygen. Ernestina met wit pt at the bedside and provided her with an update. Ernestina spoke with Dr. Patrizia Kim. Dr. Patrizia Kim will order an overnight split study. Ernestina anticipates discharge tomorrow pending overnight split study completion.

## 2023-02-13 NOTE — PROGRESS NOTES
Pulmonology and Critical Care Progress Note    Subjective:       Patient seen and examined in the room today. She is sitting comfortably in bed. On room air. She feels better. Some sputum production.           Current Facility-Administered Medications   Medication Dose Route Frequency Provider Last Rate Last Admin    predniSONE (DELTASONE) tablet 40 mg  40 mg Oral DAILY [facility]an[facility] hern[facility]   40 mg at 02/12/23 0919    benzocaine-menthoL (CHLORASEPTIC) lozenge 1 Lozenge  1 Lozenge Mucous Membrane PRN Andrey Dumont NP   1 Lozenge at 02/11/23 1542    hydrALAZINE (APRESOLINE) tablet 25 mg  25 mg Oral TID Andrey Dumont NP   25 mg at 02/13/23 0839    0.9% sodium chloride infusion  75 mL/hr IntraVENous CONTINUOUS Andrey Dumont NP 75 mL/hr at 02/13/23 0716 75 mL/hr at 02/13/23 0716    azithromycin (ZITHROMAX) 500 mg in 0.9% sodium chloride 250 mL ([id number])  500 mg IntraVENous Q24H Genaro Jordan  mL/hr at 02/12/23 2334 500 mg at 02/12/23 2334    amLODIPine (NORVASC) tablet 10 mg  10 mg Oral DAILY Andrey Dumont NP   10 mg at 02/13/23 0840    insulin lispro (HUMALOG) injection 8 Units  8 Units SubCUTAneous TID WITH MEALS Andrey Dumont NP   8 Units at 02/13/23 0840    insulin lispro (HUMALOG) injection   SubCUTAneous AC&HS Andrey Dumont NP   2 Units at 02/13/23 0841    albuterol-ipratropium (DUO-NEB) 2.5 MG-0.5 MG/3 ML  3 mL Nebulization Q6H RT Andrey Dumont NP   3 mL at 02/13/23 0809    albuterol CONCENTRATE 2.5mg/0.5 mL neb soln  2.5 mg Nebulization Q6H PRN Andrey Dumont NP        montelukast (SINGULAIR) tablet 10 mg  10 mg Oral QHS Andrey Dumont NP   10 mg at 02/13/23 0840    budesonide (PULMICORT) 500 mcg/2 ml nebulizer suspension  500 mcg Nebulization BID RT Genaro Jordan DO   500 mcg at 02/13/23 0809    guaiFENesin ER (MUCINEX) tablet 1,200 mg  1,200 mg Oral BID Genaro Jordan DO   1,200 mg at 02/11/23 2158    benzonatate (TESSALON) capsule 200 mg  200 mg Oral TID Clara Gay, DO Genaro   200 mg at 23 0839    insulin glargine (LANTUS) injection 50 Units  50 Units SubCUTAneous QHS Anjum Posada NP   50 Units at 23 2154    guaiFENesin (ROBITUSSIN) 100 mg/5 mL oral liquid 100 mg  100 mg Oral Q6H PRN Anjum Posada NP   100 mg at 23 0849    sodium chloride (NS) flush 5-40 mL  5-40 mL IntraVENous Q8H Miya Fernandez MD   10 mL at 23 0647    sodium chloride (NS) flush 5-40 mL  5-40 mL IntraVENous PRN Ari Vazquez MD        acetaminophen (TYLENOL) tablet 650 mg  650 mg Oral Q6H PRN Ari Vazquez MD        Or    acetaminophen (TYLENOL) suppository 650 mg  650 mg Rectal Q6H PRN Miya Fernandez MD        polyethylene glycol (MIRALAX) packet 17 g  17 g Oral DAILY PRN Ari Vazquez MD   17 g at 23 0849    ondansetron (ZOFRAN ODT) tablet 4 mg  4 mg Oral Q8H PRN Ari Vazquez MD        Or    [facility] injection 4 mg  4 mg IntraVENous Q6H PRN Ari Vazquez MD   4 mg at 23 2154    enoxaparin (LOVENOX) injection 40 mg  40 mg SubCUTAneous Q12H Ari Vazquez MD   40 mg at 23 0840    cefTRIAXone (ROCEPHIN) 1 g in sterile water (preservative free) 10 mL IV syringe  1 g IntraVENous Q24H Genaro Jordan DO   1 g at 23 1448    glucose chewable tablet 16 g  4 Tablet Oral PRN Ari Vazquez MD        glucagon (GLUCAGEN) injection 1 mg  1 mg IntraMUSCular PRN Ari Vazquez MD        dextrose 10% infusion 0-250 mL  0-250 mL IntraVENous PRN Ari Vazquez MD            No Known Allergies    Review of Systems:  A comprehensive review of systems was negative except for that written in the HPI. Objective:     Blood pressure (!) 153/96, pulse 83, temperature 98 °F (36.7 °C), resp. rate 20, height 5' 5\" (1.651 m), weight (!) 161.9 kg (357 lb), last menstrual period 2022, SpO2 97 %, not currently breastfeeding.  Temp (24hrs), Av.9 °F (36.6 °C), Min:97.8 °F (36.6 °C), Max:98 °F (36.7 °C)      Intake and Output:  Current Shift: No intake/output data recorded. Last 3 Shifts: No intake/output data recorded. Physical Exam:   General appearance: alert, cooperative, no distress, appears stated age, morbidly obese  Head: Normocephalic, without obvious abnormality, atraumatic  Eyes: negative  Neck: supple, symmetrical, trachea midline and no adenopathy  Lungs: Expiratory wheezing bilaterally is improved, no obvious rhonchi, currently on room air  Heart: regular rate and rhythm, S1, S2 normal, no murmur, click, rub or gallop  Abdomen: soft, non-tender. Bowel sounds normal. No masses,  no organomegaly  Extremities: extremities normal, atraumatic, no cyanosis or edema  Pulses: 2+ and symmetric  Skin: Skin color, texture, turgor normal. No rashes or lesions  Lymph nodes: Cervical, supraclavicular, and axillary nodes normal.  Neurologic: Grossly normal, alert and oriented x3    Additional comments:None    Lab/Data Review: All lab results for the last 24 hours reviewed.   Recent Results (from the past 24 hour(s))   GLUCOSE, POC    Collection Time: 02/12/23  3:53 PM   Result Value Ref Range    Glucose (POC) 283 (H) 65 - 100 mg/dL    Performed by Judith Mendez    GLUCOSE, POC    Collection Time: 02/12/23  9:42 PM   Result Value Ref Range    Glucose (POC) 286 (H) 65 - 100 mg/dL    Performed by Chuckie Hansen    CBC W/O DIFF    Collection Time: 02/13/23  5:53 AM   Result Value Ref Range    WBC 12.6 (H) 3.6 - 11.0 K/uL    RBC 4.34 3.80 - 5.20 M/uL    HGB 12.5 11.5 - 16.0 g/dL    HCT 37.9 35.0 - 47.0 %    MCV 87.3 80.0 - 99.0 FL    MCH 28.8 26.0 - 34.0 PG    MCHC 33.0 30.0 - 36.5 g/dL    RDW 14.6 (H) 11.5 - 14.5 %    PLATELET 062 912 - 400 K/uL    MPV 10.6 8.9 - 12.9 FL    NRBC 0.2 (H) 0.0  WBC    ABSOLUTE NRBC 0.02 (H) 0.00 - 0.01 K/uL   GLUCOSE, POC    Collection Time: 02/13/23  8:23 AM   Result Value Ref Range    Glucose (POC) 158 (H) 65 - 100 mg/dL    Performed by Lencho Barnhart        Chest X-Ray: CXR Results  (Last 48 hours)      None              CT imaging:  CT Results  (Last 48 hours)      None            PFTs:   PFT Results  (Last 3 results in the past 10 years)      None              Assessment:     Patient is a 40-year-old morbidly obese -American female with a history of insulin-dependent type 2 diabetes mellitus, mild intermittent asthma, and hypertension who presented to the hospital with increasing shortness of breath and wheezing for the past several days consistent with an acute exacerbation of asthma. Plan:     1.)  Acute hypoxic respiratory failure  -Secondary to acute exacerbation of asthma in the setting of community-acquired pneumonia, targeted therapies as outlined below  -Currently on room air, weaned off for sats greater than 90% on room air  -I will see her in the office in 2 to 4 weeks for outpatient PFTs and asthma management  -Overnight oximetry confirms nocturnal hypoxemia. Case management consultation for supplemental oxygen at nighttime while sleeping, recommend 2 L nasal cannula. -Plan needs an KRYSTINA evaluation as well, definitely needs significant weight loss    2.)  Acute exacerbation of asthma  -Suspect secondary to community-acquired pneumonia, targeted therapies as outlined below  -Continue antibiotics as below  -Solu-Medrol switch to p.o. prednisone. Agree. -DuoNeb nebulizers every 6 hours and Pulmicort nebs twice daily  -Overnight oximetry confirms nocturnal hypoxemia. Apparently this has to be repeated tonight since it has .   Case management consultation for supplemental oxygen at nighttime while sleeping.  -Continue home Singulair    3.)  Community-acquired pneumonia  -Very mild infiltrates in the right lower lobe, despite read on CT scan I do not appreciate much left upper lobe infiltrates, does have mosaicism bilaterally  -Could be impetus for asthma exacerbation, influenza screen negative  -Expanded infectious work-up negative  -Agree with IV Rocephin/azithromycin x7 days    4.)  Lactic acidosis  -Lactate level 3.4 on admission, had come down to 2.6  -Suspect secondary to respiratory muscle acidosis from increased work of breathing    5.)  Morbid obesity  -BMI 59.41, due to excess calories  -Significant weight loss recommended  -Needs obstructive sleep apnea evaluation in the outpatient setting    Patient can be discharged from pulmonary standpoint as discussed above. However awaiting nocturnal O2 2 L to be delivered at home.       General Thomas MD  [facility] ([facility])  2/13/2023

## 2023-02-14 VITALS
RESPIRATION RATE: 18 BRPM | TEMPERATURE: 98.3 F | BODY MASS INDEX: 48.82 KG/M2 | DIASTOLIC BLOOD PRESSURE: 82 MMHG | HEIGHT: 65 IN | SYSTOLIC BLOOD PRESSURE: 132 MMHG | HEART RATE: 94 BPM | WEIGHT: 293 LBS | OXYGEN SATURATION: 96 %

## 2023-02-14 PROBLEM — A41.9 SEPSIS (HCC): Status: RESOLVED | Noted: 2023-02-06 | Resolved: 2023-02-14

## 2023-02-14 PROBLEM — J45.901 ASTHMA EXACERBATION: Status: RESOLVED | Noted: 2023-02-06 | Resolved: 2023-02-14

## 2023-02-14 PROBLEM — J18.9 MULTIFOCAL PNEUMONIA: Status: RESOLVED | Noted: 2023-02-06 | Resolved: 2023-02-14

## 2023-02-14 LAB
ALBUMIN SERPL-MCNC: 2.7 G/DL (ref 3.5–5)
ALBUMIN/GLOB SERPL: 0.6 (ref 1.1–2.2)
ALP SERPL-CCNC: 68 U/L (ref 45–117)
ALT SERPL-CCNC: 25 U/L (ref 12–78)
ANION GAP SERPL CALC-SCNC: 5 MMOL/L (ref 5–15)
AST SERPL W P-5'-P-CCNC: 24 U/L (ref 15–37)
BACTERIA SPEC CULT: NORMAL
BASOPHILS # BLD: 0 K/UL (ref 0–0.1)
BASOPHILS NFR BLD: 0 % (ref 0–1)
BILIRUB SERPL-MCNC: 0.2 MG/DL (ref 0.2–1)
BUN SERPL-MCNC: 12 MG/DL (ref 6–20)
BUN/CREAT SERPL: 18 (ref 12–20)
CA-I BLD-MCNC: 9 MG/DL (ref 8.5–10.1)
CHLORIDE SERPL-SCNC: 102 MMOL/L (ref 97–108)
CO2 SERPL-SCNC: 29 MMOL/L (ref 21–32)
CREAT SERPL-MCNC: 0.67 MG/DL (ref 0.55–1.02)
DIFFERENTIAL METHOD BLD: ABNORMAL
EOSINOPHIL # BLD: 0.1 K/UL (ref 0–0.4)
EOSINOPHIL NFR BLD: 1 % (ref 0–7)
ERYTHROCYTE [DISTWIDTH] IN BLOOD BY AUTOMATED COUNT: 14.9 % (ref 11.5–14.5)
GLOBULIN SER CALC-MCNC: 4.4 G/DL (ref 2–4)
GLUCOSE BLD STRIP.AUTO-MCNC: 101 MG/DL (ref 65–100)
GLUCOSE BLD STRIP.AUTO-MCNC: 127 MG/DL (ref 65–100)
GLUCOSE SERPL-MCNC: 139 MG/DL (ref 65–100)
HCT VFR BLD AUTO: 40.6 % (ref 35–47)
HGB BLD-MCNC: 13.3 G/DL (ref 11.5–16)
IMM GRANULOCYTES # BLD AUTO: 0.1 K/UL (ref 0–0.04)
IMM GRANULOCYTES NFR BLD AUTO: 1 % (ref 0–0.5)
LYMPHOCYTES # BLD: 4.7 K/UL (ref 0.8–3.5)
LYMPHOCYTES NFR BLD: 42 % (ref 12–49)
MCH RBC QN AUTO: 28.6 PG (ref 26–34)
MCHC RBC AUTO-ENTMCNC: 32.8 G/DL (ref 30–36.5)
MCV RBC AUTO: 87.3 FL (ref 80–99)
MONOCYTES # BLD: 0.5 K/UL (ref 0–1)
MONOCYTES NFR BLD: 5 % (ref 5–13)
NEUTS SEG # BLD: 5.7 K/UL (ref 1.8–8)
NEUTS SEG NFR BLD: 51 % (ref 32–75)
NRBC # BLD: 0 K/UL (ref 0–0.01)
NRBC BLD-RTO: 0 PER 100 WBC
PERFORMED BY, TECHID: ABNORMAL
PERFORMED BY, TECHID: ABNORMAL
PLATELET # BLD AUTO: 306 K/UL (ref 150–400)
PMV BLD AUTO: 10.4 FL (ref 8.9–12.9)
POTASSIUM SERPL-SCNC: 3.5 MMOL/L (ref 3.5–5.1)
PROT SERPL-MCNC: 7.1 G/DL (ref 6.4–8.2)
RBC # BLD AUTO: 4.65 M/UL (ref 3.8–5.2)
SODIUM SERPL-SCNC: 136 MMOL/L (ref 136–145)
SPECIAL REQUESTS,SREQ: NORMAL
WBC # BLD AUTO: 11 K/UL (ref 3.6–11)

## 2023-02-14 PROCEDURE — 80053 COMPREHEN METABOLIC PANEL: CPT

## 2023-02-14 PROCEDURE — 74011250637 HC RX REV CODE- 250/637: Performed by: NURSE PRACTITIONER

## 2023-02-14 PROCEDURE — 74011250637 HC RX REV CODE- 250/637: Performed by: INTERNAL MEDICINE

## 2023-02-14 PROCEDURE — 36415 COLL VENOUS BLD VENIPUNCTURE: CPT

## 2023-02-14 PROCEDURE — 94640 AIRWAY INHALATION TREATMENT: CPT

## 2023-02-14 PROCEDURE — 82962 GLUCOSE BLOOD TEST: CPT

## 2023-02-14 PROCEDURE — 74011250636 HC RX REV CODE- 250/636: Performed by: STUDENT IN AN ORGANIZED HEALTH CARE EDUCATION/TRAINING PROGRAM

## 2023-02-14 PROCEDURE — 74011636637 HC RX REV CODE- 636/637: Performed by: NURSE PRACTITIONER

## 2023-02-14 PROCEDURE — 74011000250 HC RX REV CODE- 250: Performed by: INTERNAL MEDICINE

## 2023-02-14 PROCEDURE — 85025 COMPLETE CBC W/AUTO DIFF WBC: CPT

## 2023-02-14 PROCEDURE — 94762 N-INVAS EAR/PLS OXIMTRY CONT: CPT

## 2023-02-14 PROCEDURE — 74011000250 HC RX REV CODE- 250: Performed by: STUDENT IN AN ORGANIZED HEALTH CARE EDUCATION/TRAINING PROGRAM

## 2023-02-14 RX ORDER — BENZONATATE 200 MG/1
200 CAPSULE ORAL 3 TIMES DAILY
Qty: 21 CAPSULE | Refills: 0 | Status: SHIPPED | OUTPATIENT
Start: 2023-02-14 | End: 2023-02-21

## 2023-02-14 RX ORDER — MONTELUKAST SODIUM 10 MG/1
10 TABLET ORAL
Qty: 30 TABLET | Refills: 0 | Status: SHIPPED | OUTPATIENT
Start: 2023-02-15 | End: 2023-03-17

## 2023-02-14 RX ORDER — AZITHROMYCIN 500 MG/1
500 TABLET, FILM COATED ORAL DAILY
Qty: 4 TABLET | Refills: 0 | Status: SHIPPED | OUTPATIENT
Start: 2023-02-14 | End: 2023-02-18

## 2023-02-14 RX ORDER — AMLODIPINE BESYLATE 10 MG/1
10 TABLET ORAL DAILY
Qty: 30 TABLET | Refills: 0 | Status: SHIPPED | OUTPATIENT
Start: 2023-02-15 | End: 2023-03-17

## 2023-02-14 RX ADMIN — MONTELUKAST 10 MG: 10 TABLET, FILM COATED ORAL at 09:39

## 2023-02-14 RX ADMIN — AMLODIPINE BESYLATE 10 MG: 5 TABLET ORAL at 09:39

## 2023-02-14 RX ADMIN — HYDRALAZINE HYDROCHLORIDE 25 MG: 25 TABLET, FILM COATED ORAL at 09:39

## 2023-02-14 RX ADMIN — SODIUM CHLORIDE, PRESERVATIVE FREE 10 ML: 5 INJECTION INTRAVENOUS at 05:19

## 2023-02-14 RX ADMIN — INSULIN LISPRO 8 UNITS: 100 INJECTION, SOLUTION INTRAVENOUS; SUBCUTANEOUS at 13:10

## 2023-02-14 RX ADMIN — INSULIN LISPRO 8 UNITS: 100 INJECTION, SOLUTION INTRAVENOUS; SUBCUTANEOUS at 09:38

## 2023-02-14 RX ADMIN — BENZONATATE 200 MG: 100 CAPSULE ORAL at 09:39

## 2023-02-14 RX ADMIN — BUDESONIDE 500 MCG: 0.5 SUSPENSION RESPIRATORY (INHALATION) at 10:00

## 2023-02-14 RX ADMIN — ENOXAPARIN SODIUM 40 MG: 100 INJECTION SUBCUTANEOUS at 09:38

## 2023-02-14 NOTE — PROGRESS NOTES
Problem: Discharge Planning  Goal: *Discharge to safe environment  Outcome: Progressing Towards Goal  Goal: *Knowledge of medication management  Outcome: Progressing Towards Goal  Goal: *Knowledge of discharge instructions  Outcome: Progressing Towards Goal     Problem: Patient Education: Go to Patient Education Activity  Goal: Patient/Family Education  Outcome: Progressing Towards Goal     Problem: Pain  Goal: *Control of Pain  Outcome: Progressing Towards Goal  Goal: *PALLIATIVE CARE:  Alleviation of Pain  Outcome: Progressing Towards Goal     Problem: Patient Education: Go to Patient Education Activity  Goal: Patient/Family Education  Outcome: Progressing Towards Goal     Problem: Impaired Skin Integrity/Pressure Injury Treatment  Goal: *Improvement of Existing Pressure Injury  Outcome: Progressing Towards Goal  Goal: *Prevention of pressure injury  Description: Document Maurilio Scale and appropriate interventions in the flowsheet. Outcome: Progressing Towards Goal  Note: Pressure Injury Interventions:  Sensory Interventions: Minimize linen layers                   Nutrition Interventions: Document food/fluid/supplement intake, Offer support with meals,snacks and hydration                     Problem: Patient Education: Go to Patient Education Activity  Goal: Patient/Family Education  Outcome: Progressing Towards Goal     Problem: Diabetes Self-Management  Goal: *Disease process and treatment process  Description: Define diabetes and identify own type of diabetes; list 3 options for treating diabetes. Outcome: Progressing Towards Goal  Goal: *Incorporating nutritional management into lifestyle  Description: Describe effect of type, amount and timing of food on blood glucose; list 3 methods for planning meals. Outcome: Progressing Towards Goal  Goal: *Incorporating physical activity into lifestyle  Description: State effect of exercise on blood glucose levels.   Outcome: Progressing Towards Goal  Goal: *Developing strategies to promote health/change behavior  Description: Define the ABC's of diabetes; identify appropriate screenings, schedule and personal plan for screenings. Outcome: Progressing Towards Goal  Goal: *Using medications safely  Description: State effect of diabetes medications on diabetes; name diabetes medication taking, action and side effects. Outcome: Progressing Towards Goal  Goal: *Monitoring blood glucose, interpreting and using results  Description: Identify recommended blood glucose targets  and personal targets. Outcome: Progressing Towards Goal  Goal: *Prevention, detection, treatment of acute complications  Description: List symptoms of hyper- and hypoglycemia; describe how to treat low blood sugar and actions for lowering  high blood glucose level. Outcome: Progressing Towards Goal  Goal: *Prevention, detection and treatment of chronic complications  Description: Define the natural course of diabetes and describe the relationship of blood glucose levels to long term complications of diabetes.   Outcome: Progressing Towards Goal  Goal: *Developing strategies to address psychosocial issues  Description: Describe feelings about living with diabetes; identify support needed and support network  Outcome: Progressing Towards Goal  Goal: *Insulin pump training  Outcome: Progressing Towards Goal  Goal: *Sick day guidelines  Outcome: Progressing Towards Goal  Goal: *Patient Specific Goal (EDIT GOAL, INSERT TEXT)  Outcome: Progressing Towards Goal     Problem: Patient Education: Go to Patient Education Activity  Goal: Patient/Family Education  Outcome: Progressing Towards Goal     Problem: General Medical Care Plan  Goal: *Vital signs within specified parameters  Outcome: Progressing Towards Goal  Goal: *Labs within defined limits  Outcome: Progressing Towards Goal  Goal: *Absence of infection signs and symptoms  Outcome: Progressing Towards Goal  Goal: *Optimal pain control at patient's stated goal  Outcome: Progressing Towards Goal  Goal: *Skin integrity maintained  Outcome: Progressing Towards Goal  Goal: *Fluid volume balance  Outcome: Progressing Towards Goal  Goal: *Optimize nutritional status  Outcome: Progressing Towards Goal  Goal: *Anxiety reduced or absent  Outcome: Progressing Towards Goal  Goal: *Progressive mobility and function (eg: ADL's)  Outcome: Progressing Towards Goal     Problem: Patient Education: Go to Patient Education Activity  Goal: Patient/Family Education  Outcome: Progressing Towards Goal

## 2023-02-14 NOTE — PROGRESS NOTES
DC Plan: Home    Overnight split study was completed. Bobby sent the split study to [facility] via [address]. Per [facility], pt does not qualify for nocturnal oxygen. Cm met with pt at the bedside and gave her an update regarding her nocturnal oxygen. Pt will not discharge home with nocturnal O2. Cm spoke with pt's nurse and attending. Discharge plan of care/case management plan validated with provider's discharge order.

## 2023-02-14 NOTE — PROGRESS NOTES
Pulmonology and Critical Care Progress Note    Subjective:       Patient seen and examined in the room today. She is sitting comfortably in bed. On room air. She feels better. Some sputum production.           Current Facility-Administered Medications   Medication Dose Route Frequency Provider Last Rate Last Admin    albuterol-ipratropium (DUO-NEB) 2.5 MG-0.5 MG/3 ML  3 mL Nebulization Q6H PRN Genaro Jordan DO        cefTRIAXone (ROCEPHIN) 1 g in sterile water (preservative free) 10 mL IV syringe  1 g IntraVENous Q24H Anthony LANTIGUA NP   1 g at 02/13/23 1632    predniSONE (DELTASONE) tablet 40 mg  40 mg Oral DAILY WITH Dora Mike NP   40 mg at 02/12/23 0919    benzocaine-menthoL (CHLORASEPTIC) lozenge 1 Lozenge  1 Lozenge Mucous Membrane PRN Magdafreedome Bernadette NP   1 Lozenge at 02/11/23 1542    hydrALAZINE (APRESOLINE) tablet 25 mg  25 mg Oral TID dafreedome Bernadette NP   25 mg at 02/14/23 0939    0.9% sodium chloride infusion  75 mL/hr IntraVENous CONTINUOUS Magdalene Bernadette NP 75 mL/hr at 02/13/23 2327 75 mL/hr at 02/13/23 2327    azithromycin (ZITHROMAX) 500 mg in 0.9% sodium chloride 250 mL ([id number])  500 mg IntraVENous Q24H Genaro Jordan  mL/hr at 02/13/23 2331 500 mg at 02/13/23 2331    amLODIPine (NORVASC) tablet 10 mg  10 mg Oral DAILY dafreedome Bernadette NP   10 mg at 02/14/23 0939    insulin lispro (HUMALOG) injection 8 Units  8 Units SubCUTAneous TID WITH MEALS dafreedome Bernadette NP   8 Units at 02/14/23 1842    insulin lispro (HUMALOG) injection   SubCUTAneous AC&HS dalene Bernadette NP   2 Units at 02/13/23 0841    albuterol CONCENTRATE 2.5mg/0.5 mL neb soln  2.5 mg Nebulization Q6H PRN Hazele Bernadette NP        montelukast (SINGULAIR) tablet 10 mg  10 mg Oral QHS Magdalene Lick, NP   10 mg at 02/14/23 0939    budesonide (PULMICORT) 500 mcg/2 ml nebulizer suspension  500 mcg Nebulization BID RT Genaro Jordan DO   500 mcg at 02/14/23 1000    guaiFENesin ER (MUCINEX) tablet 1,200 mg 1,200 mg Oral BID Grant Birmingham, DO   1,200 mg at 23 2158    benzonatate (TESSALON) capsule 200 mg  200 mg Oral TID Tonye Claude, Sean, DO   200 mg at 23 0939    insulin glargine (LANTUS) injection 50 Units  50 Units SubCUTAneous QHS Andrea Qiu, NP   50 Units at 23 2131    guaiFENesin (ROBITUSSIN) 100 mg/5 mL oral liquid 100 mg  100 mg Oral Q6H PRN Andrea Qiu NP   100 mg at 23 0849    sodium chloride (NS) flush 5-40 mL  5-40 mL IntraVENous Q8H Lorena Fernandez MD   10 mL at 23 0519    sodium chloride (NS) flush 5-40 mL  5-40 mL IntraVENous PRN Devaughn Newsome MD        acetaminophen (TYLENOL) tablet 650 mg  650 mg Oral Q6H PRN Devaughn Newsome MD        Or    acetaminophen (TYLENOL) suppository 650 mg  650 mg Rectal Q6H PRN Lorena Fernandez MD        polyethylene glycol (MIRALAX) packet 17 g  17 g Oral DAILY PRN Devaughn Newsome MD   17 g at 23 0849    ondansetron (ZOFRAN ODT) tablet 4 mg  4 mg Oral Q8H PRN Devaughn Newsome MD        Or    ondansetron [facility]) injection 4 mg  4 mg IntraVENous Q6H PRN Devaughn Newsome MD   4 mg at 23 2154    enoxaparin (LOVENOX) injection 40 mg  40 mg SubCUTAneous Q12H Devaughn Newsome MD   40 mg at 23 7686    glucose chewable tablet 16 g  4 Tablet Oral PRN Devaughn Newsome MD        glucagon (GLUCAGEN) injection 1 mg  1 mg IntraMUSCular PRN Devaughn Newsome MD        dextrose 10% infusion 0-250 mL  0-250 mL IntraVENous PRN Devaughn Newsome MD            No Known Allergies    Review of Systems:  A comprehensive review of systems was negative except for that written in the HPI. Objective:     Blood pressure 132/82, pulse 94, temperature 98.3 °F (36.8 °C), resp. rate 18, height 5' 5\" (1.651 m), weight (!) 161.9 kg (357 lb), last menstrual period 2022, SpO2 96 %, not currently breastfeeding.  Temp (24hrs), Av.1 °F (36.7 °C), Min:97.7 °F (36.5 °C), Max:98.3 °F (36.8 °C)      Intake and Output:  Current Shift: No intake/output data recorded. Last 3 Shifts: No intake/output data recorded. Physical Exam:   General appearance: alert, cooperative, no distress, appears stated age, morbidly obese  Head: Normocephalic, without obvious abnormality, atraumatic  Eyes: negative  Neck: supple, symmetrical, trachea midline and no adenopathy  Lungs: Expiratory wheezing bilaterally is improved, no obvious rhonchi, currently on room air  Heart: regular rate and rhythm, S1, S2 normal, no murmur, click, rub or gallop  Abdomen: soft, non-tender. Bowel sounds normal. No masses,  no organomegaly  Extremities: extremities normal, atraumatic, no cyanosis or edema  Pulses: 2+ and symmetric  Skin: Skin color, texture, turgor normal. No rashes or lesions  Lymph nodes: Cervical, supraclavicular, and axillary nodes normal.  Neurologic: Grossly normal, alert and oriented x3    Additional comments:None    Lab/Data Review: All lab results for the last 24 hours reviewed.   Recent Results (from the past 24 hour(s))   GLUCOSE, POC    Collection Time: 02/13/23 12:48 PM   Result Value Ref Range    Glucose (POC) 88 65 - 100 mg/dL    Performed by [address], POC    Collection Time: 02/13/23  4:19 PM   Result Value Ref Range    Glucose (POC) 91 65 - 100 mg/dL    Performed by [address] 93, POC    Collection Time: 02/13/23  9:26 PM   Result Value Ref Range    Glucose (POC) 118 (H) 65 - 100 mg/dL    Performed by Bandar Potts    GLUCOSE, POC    Collection Time: 02/14/23  7:42 AM   Result Value Ref Range    Glucose (POC) 127 (H) 65 - 100 mg/dL    Performed by Gabe York    CBC WITH AUTOMATED DIFF    Collection Time: 02/14/23 11:02 AM   Result Value Ref Range    WBC 11.0 3.6 - 11.0 K/uL    RBC 4.65 3.80 - 5.20 M/uL    HGB 13.3 11.5 - 16.0 g/dL    HCT 40.6 35.0 - 47.0 %    MCV 87.3 80.0 - 99.0 FL    MCH 28.6 26.0 - 34.0 PG    MCHC 32.8 30.0 - 36.5 g/dL    RDW 14.9 (H) 11.5 - 14.5 %    PLATELET 037 150 - 400 K/uL    MPV 10.4 8.9 - 12.9 FL    NRBC 0.0 0.0  WBC    ABSOLUTE NRBC 0.00 0.00 - 0.01 K/uL    NEUTROPHILS 51 32 - 75 %    LYMPHOCYTES 42 12 - 49 %    MONOCYTES 5 5 - 13 %    EOSINOPHILS 1 0 - 7 %    BASOPHILS 0 0 - 1 %    IMMATURE GRANULOCYTES 1 (H) 0 - 0.5 %    ABS. NEUTROPHILS 5.7 1.8 - 8.0 K/UL    ABS. LYMPHOCYTES 4.7 (H) 0.8 - 3.5 K/UL    ABS. MONOCYTES 0.5 0.0 - 1.0 K/UL    ABS. EOSINOPHILS 0.1 0.0 - 0.4 K/UL    ABS. BASOPHILS 0.0 0.0 - 0.1 K/UL    ABS. IMM. GRANS. 0.1 (H) 0.00 - 0.04 K/UL    DF AUTOMATED     METABOLIC PANEL, COMPREHENSIVE    Collection Time: 02/14/23 11:02 AM   Result Value Ref Range    Sodium 136 136 - 145 mmol/L    Potassium 3.5 3.5 - 5.1 mmol/L    Chloride 102 97 - 108 mmol/L    CO2 29 21 - 32 mmol/L    Anion gap 5 5 - 15 mmol/L    Glucose 139 (H) 65 - 100 mg/dL    BUN 12 6 - 20 mg/dL    Creatinine 0.67 0.55 - 1.02 mg/dL    BUN/Creatinine ratio 18 12 - 20      eGFR >60 >60 ml/min/1.73m2    Calcium 9.0 8.5 - 10.1 mg/dL    Bilirubin, total 0.2 0.2 - 1.0 mg/dL    AST (SGOT) 24 15 - 37 U/L    ALT (SGPT) 25 12 - 78 U/L    Alk.  phosphatase 68 45 - 117 U/L    Protein, total 7.1 6.4 - 8.2 g/dL    Albumin 2.7 (L) 3.5 - 5.0 g/dL    Globulin 4.4 (H) 2.0 - 4.0 g/dL    A-G Ratio 0.6 (L) 1.1 - 2.2     GLUCOSE, POC    Collection Time: 02/14/23 12:25 PM   Result Value Ref Range    Glucose (POC) 101 (H) 65 - 100 mg/dL    Performed by Vicente Mcmahon        Chest X-Ray:   CXR Results  (Last 48 hours)      None              CT imaging:  CT Results  (Last 48 hours)      None            PFTs:   PFT Results  (Last 3 results in the past 10 years)      None              Assessment:     Patient is a 42-year-old morbidly obese -American female with a history of insulin-dependent type 2 diabetes mellitus, mild intermittent asthma, and hypertension who presented to the hospital with increasing shortness of breath and wheezing for the past several days consistent with an acute exacerbation of asthma. Plan:     1.)  Acute hypoxic respiratory failure  -Secondary to acute exacerbation of asthma in the setting of community-acquired pneumonia, targeted therapies as outlined below  -Currently on room air, weaned off for sats greater than 90% on room air  -I will see her in the office in 2 to 4 weeks for outpatient PFTs and asthma management  -Overnight oximetry confirms nocturnal hypoxemia. Case management consultation for supplemental oxygen at nighttime while sleeping, recommend 2 L nasal cannula. -Plan needs an KRYSTINA evaluation as well, definitely needs significant weight loss    Split study last night  Per note in the chart patient did not require oxygen    2.)  Acute exacerbation of asthma  -Suspect secondary to community-acquired pneumonia, targeted therapies as outlined below  -Continue antibiotics as below  -Solu-Medrol switch to p.o. prednisone. Agree. -DuoNeb nebulizers every 6 hours and Pulmicort nebs twice daily  -Continue home Singulair    3.)  Community-acquired pneumonia  -Very mild infiltrates in the right lower lobe, despite read on CT scan I do not appreciate much left upper lobe infiltrates, does have mosaicism bilaterally  -Could be impetus for asthma exacerbation, influenza screen negative  -Expanded infectious work-up negative  -Agree with IV Rocephin/azithromycin x7 days    4.)  Lactic acidosis  -Lactate level 3.4 on admission, had come down to 2.6  -Suspect secondary to respiratory muscle acidosis from increased work of breathing    5.)  Morbid obesity  -BMI 59.41, due to excess calories  -Significant weight loss recommended  -Needs obstructive sleep apnea evaluation in the outpatient setting    Patient can be discharged from pulmonary standpoint as discussed above.       Questions of patient were answered at bedside in detail  Case discussed in detail with RN, RT, and care team  Thank you for involving me in the care of this patient  I will follow with you closely during hospitalization    Time spent more than 30 minutes under patient care with no overlap reviewing results and records, decision making, and answering questions.     Spencer Ramachandran MD  [facility] ([facility])  2/14/2023

## 2023-02-14 NOTE — DISCHARGE SUMMARY
Hospitalist Discharge Summary     Patient ID:    Daryle Penny  [id number]  38 y.o.  1980    Admit date: 2/6/2023    Discharge date : 2/14/2023      Final Diagnoses: Active Problems:    * No active hospital problems. *      Reason for Hospitalization/Hospital Course:   40-year-old lady with past medical history significant for diabetes mellitus type 2, asthma presented to the hospital complaining of shortness of breath, cough with sputum production. Symptoms started 2 days prior to admission. Patient has been experiencing significant shortness of breath, wheezing. Patient used her rescue inhaler but it did not help her. Patient also has been experiencing significant productive cough. Patient presented to the hospital for further evaluation. In the ER, patient was found to be hypoxic requiring supplemental oxygen. Even after providing multiple breathing treatments, steroids, antibiotics patient continued to remain short of breath requiring supplemental oxygen. Internal medicine was consulted for admission. We were asked to admit for work up and evaluation of the above problems. Patient started on Solu-Medrol, IV Rocephin and azithromycin. Start singulair. Consult pulmonary. Pulmonary proximity shows that patient O2 sats was less than 90% over 4 hours. Patient O2 sats was less than 88% for 29.3 minutes. DC IV steroids, transition to PO steroids. But patient states they make her \"sick\" and is not taking them. Discharge delayed due to awaiting home nocturn evaluation    Patient's repeat home nocturn evaluation last evening did not show that she needed oxygen. She is medically cleared by Pulmonary for discharge home with follow up as out patient. Discharge discussed with patient.        Discharge Medications:   Current Discharge Medication List        START taking these medications    Details   amLODIPine (NORVASC) 10 mg tablet Take 1 Tablet by mouth daily for 30 days.  Tila Pa: 30 Tablet, Refills: 0  Start date: 2/15/2023, End date: 3/17/2023      benzonatate (TESSALON) 200 mg capsule Take 1 Capsule by mouth three (3) times daily for 7 days. Qty: 21 Capsule, Refills: 0  Start date: 2/14/2023, End date: 2/21/2023      montelukast (SINGULAIR) 10 mg tablet Take 1 Tablet by mouth nightly for 30 days. Qty: 30 Tablet, Refills: 0  Start date: 2/15/2023, End date: 3/17/2023      azithromycin (ZITHROMAX) 500 mg tab Take 1 Tablet by mouth daily for 4 days. Qty: 4 Tablet, Refills: 0  Start date: 2/14/2023, End date: 2/18/2023           CONTINUE these medications which have NOT CHANGED    Details   atorvastatin (LIPITOR) 10 mg tablet Take 10 mg by mouth daily. insulin glargine (LANTUS,BASAGLAR) 100 unit/mL (3 mL) inpn 50 Units by SubCUTAneous route nightly. insulin aspart U-100 (NOVOLOG) 100 unit/mL (3 mL) inpn 2 Units by SubCUTAneous route. 2 units with breakfast and lunch. 4 units with larger dinner. liraglutide (Victoza 2-Jonn) 0.6 mg/0.1 mL (18 mg/3 mL) pnij See Instructions, start with 0.6 mg daily and increase gradually to1.8 mg in 2 weeks SQ daily, # 15 mL, 2 Refills, Pharmacy: [facility]/pharmacy #[id number]     albuterol (PROVENTIL HFA, VENTOLIN HFA, PROAIR HFA) 90 mcg/actuation inhaler Take  by inhalation. medroxyPROGESTERone (PROVERA) 10 mg tablet TAKE 1 TABLET BY MOUTH EVERY DAY FOR 10 DAYS  Qty: 10 Tablet, Refills: 2      Alyacen 1/35, 28, 1-35 mg-mcg tab TAKE 1 TABLET BY MOUTH EVERY DAY  Qty: 28 Tablet, Refills: 2    Comments: DX Code Needed  PT REQUESTED A REFILL. Associated Diagnoses: Irregular menses      metFORMIN (GLUCOPHAGE) 500 mg tablet Take 500 mg by mouth two (2) times daily (with meals). furosemide (LASIX) 40 mg tablet Take  by mouth daily. STOP taking these medications       ergocalciferol (ERGOCALCIFEROL) 1,250 mcg (50,000 unit) capsule Comments:   Reason for Stopping: Follow up Care:    1.  Li Snow MD in 1-2 weeks.      Follow-up Information       Follow up With Specialties Details Why Contact Info    Flako Rollins MD Family Medicine Follow up in 1 week(s) Hospital follow up, monitor blood pressure [address] [address] [phone]      Pepito Reyna MD Pulmonary Disease, Internal Medicine Physician Follow up in 2 week(s) Hospital Follow up asthma exacerbation [address]  [phone]                Patient Follow Up Instructions: Activity: Activity as tolerated  Diet:  Cardiac Diet  Wound Care: None needed     Condition at Discharge:  Stable  __________________________________________________________________    Disposition  Home or Self Care  ____________________________________________________________________    Code Status:  Full Code  ___________________________________________________________________    Discharge Exam:  Patient seen and examined by me on discharge day. Pertinent Findings:  Gen:    Not in distress  Chest: Clear lungs  CVS:   Regular rhythm.   No edema  Abd:  Soft, not distended, not tender  Neuro:  Alert & Oriented x 4         CONSULTATIONS: Pulmonary/Intensive care    Significant Diagnostic Studies:   Recent Results (from the past 24 hour(s))   GLUCOSE, POC    Collection Time: 02/13/23 12:48 PM   Result Value Ref Range    Glucose (POC) 88 65 - 100 mg/dL    Performed by Jimenez 93, POC    Collection Time: 02/13/23  4:19 PM   Result Value Ref Range    Glucose (POC) 91 65 - 100 mg/dL    Performed by Jimenez Servin, POC    Collection Time: 02/13/23  9:26 PM   Result Value Ref Range    Glucose (POC) 118 (H) 65 - 100 mg/dL    Performed by Gustavo Rogel    GLUCOSE, POC    Collection Time: 02/14/23  7:42 AM   Result Value Ref Range    Glucose (POC) 127 (H) 65 - 100 mg/dL    Performed by Brenda Valente    CBC WITH AUTOMATED DIFF    Collection Time: 02/14/23 11:02 AM   Result Value Ref Range    WBC 11.0 3.6 - 11.0 K/uL    RBC 4.65 3.80 - 5.20 M/uL    HGB 13.3 11.5 - 16.0 g/dL    HCT 40.6 35.0 - 47.0 %    MCV 87.3 80.0 - 99.0 FL    MCH 28.6 26.0 - 34.0 PG    MCHC 32.8 30.0 - 36.5 g/dL    RDW 14.9 (H) 11.5 - 14.5 %    PLATELET 730 522 - 711 K/uL    MPV 10.4 8.9 - 12.9 FL    NRBC 0.0 0.0  WBC    ABSOLUTE NRBC 0.00 0.00 - 0.01 K/uL    NEUTROPHILS 51 32 - 75 %    LYMPHOCYTES 42 12 - 49 %    MONOCYTES 5 5 - 13 %    EOSINOPHILS 1 0 - 7 %    BASOPHILS 0 0 - 1 %    IMMATURE GRANULOCYTES 1 (H) 0 - 0.5 %    ABS. NEUTROPHILS 5.7 1.8 - 8.0 K/UL    ABS. LYMPHOCYTES 4.7 (H) 0.8 - 3.5 K/UL    ABS. MONOCYTES 0.5 0.0 - 1.0 K/UL    ABS. EOSINOPHILS 0.1 0.0 - 0.4 K/UL    ABS. BASOPHILS 0.0 0.0 - 0.1 K/UL    ABS. IMM. GRANS. 0.1 (H) 0.00 - 0.04 K/UL    DF AUTOMATED       CTA CHEST W OR W WO CONT   Final Result   No evidence of acute pulmonary embolus. Airspace disease in the right lower lobe   and left upper lobe suggests pneumonia. Indeterminate enlarged superior   mediastinal lymph node; CT follow-up is recommended. Enlarged main pulmonary   artery suggests pulmonary hypertension. XR CHEST PORT   Final Result   1.  No acute disease              Time spent in direct and indirect care including coordination of services: Greater than 35 minutes    Signed:  April Gasca NP  2/14/2023  11:47 AM

## 2023-05-23 RX ORDER — ATORVASTATIN CALCIUM 10 MG/1
10 TABLET, FILM COATED ORAL DAILY
Status: ON HOLD | COMMUNITY
Start: 2023-01-24 | End: 2023-06-23 | Stop reason: HOSPADM

## 2023-05-23 RX ORDER — ALBUTEROL SULFATE 90 UG/1
AEROSOL, METERED RESPIRATORY (INHALATION)
COMMUNITY

## 2023-05-23 RX ORDER — FUROSEMIDE 40 MG/1
TABLET ORAL DAILY
Status: ON HOLD | COMMUNITY
End: 2023-06-23 | Stop reason: HOSPADM

## 2023-05-23 RX ORDER — INSULIN GLARGINE 100 [IU]/ML
50 INJECTION, SOLUTION SUBCUTANEOUS
COMMUNITY

## 2023-05-23 RX ORDER — LIRAGLUTIDE 6 MG/ML
INJECTION SUBCUTANEOUS
COMMUNITY
Start: 2021-07-21

## 2023-05-23 RX ORDER — MEDROXYPROGESTERONE ACETATE 10 MG/1
TABLET ORAL
Status: ON HOLD | COMMUNITY
Start: 2023-01-25 | End: 2023-06-23 | Stop reason: HOSPADM

## 2023-06-19 ENCOUNTER — HOSPITAL ENCOUNTER (INPATIENT)
Facility: HOSPITAL | Age: 43
LOS: 4 days | Discharge: HOME | DRG: 143 | End: 2023-06-23
Attending: FAMILY MEDICINE | Admitting: FAMILY MEDICINE
Payer: COMMERCIAL

## 2023-06-19 ENCOUNTER — APPOINTMENT (OUTPATIENT)
Facility: HOSPITAL | Age: 43
DRG: 143 | End: 2023-06-19
Payer: COMMERCIAL

## 2023-06-19 DIAGNOSIS — J98.11 COLLAPSE OF LEFT LUNG: ICD-10-CM

## 2023-06-19 DIAGNOSIS — R09.02 HYPOXIA: Primary | ICD-10-CM

## 2023-06-19 LAB
ALBUMIN SERPL-MCNC: 3.3 G/DL (ref 3.5–5)
ALBUMIN/GLOB SERPL: 0.7 (ref 1.1–2.2)
ALP SERPL-CCNC: 93 U/L (ref 45–117)
ALT SERPL-CCNC: 19 U/L (ref 12–78)
ANION GAP SERPL CALC-SCNC: 10 MMOL/L (ref 5–15)
AST SERPL W P-5'-P-CCNC: 17 U/L (ref 15–37)
BASOPHILS # BLD: 0 K/UL (ref 0–0.1)
BASOPHILS NFR BLD: 1 % (ref 0–1)
BILIRUB SERPL-MCNC: 0.3 MG/DL (ref 0.2–1)
BUN SERPL-MCNC: 8 MG/DL (ref 6–20)
BUN/CREAT SERPL: 8 (ref 12–20)
CA-I BLD-MCNC: 9.8 MG/DL (ref 8.5–10.1)
CHLORIDE SERPL-SCNC: 97 MMOL/L (ref 97–108)
CO2 SERPL-SCNC: 29 MMOL/L (ref 21–32)
CREAT SERPL-MCNC: 0.96 MG/DL (ref 0.55–1.02)
DIFFERENTIAL METHOD BLD: ABNORMAL
EOSINOPHIL # BLD: 0 K/UL (ref 0–0.4)
EOSINOPHIL NFR BLD: 1 % (ref 0–7)
ERYTHROCYTE [DISTWIDTH] IN BLOOD BY AUTOMATED COUNT: 14.6 % (ref 11.5–14.5)
FLUAV AG NPH QL IA: NEGATIVE
FLUBV AG NOSE QL IA: NEGATIVE
GLOBULIN SER CALC-MCNC: 4.8 G/DL (ref 2–4)
GLUCOSE SERPL-MCNC: 235 MG/DL (ref 65–100)
HCT VFR BLD AUTO: 42.7 % (ref 35–47)
HGB BLD-MCNC: 13.9 G/DL (ref 11.5–16)
IMM GRANULOCYTES # BLD AUTO: 0 K/UL (ref 0–0.04)
IMM GRANULOCYTES NFR BLD AUTO: 0 % (ref 0–0.5)
LACTATE SERPL-SCNC: 2 MMOL/L (ref 0.4–2)
LYMPHOCYTES # BLD: 2.5 K/UL (ref 0.8–3.5)
LYMPHOCYTES NFR BLD: 39 % (ref 12–49)
MCH RBC QN AUTO: 28.8 PG (ref 26–34)
MCHC RBC AUTO-ENTMCNC: 32.6 G/DL (ref 30–36.5)
MCV RBC AUTO: 88.4 FL (ref 80–99)
MONOCYTES # BLD: 0.9 K/UL (ref 0–1)
MONOCYTES NFR BLD: 14 % (ref 5–13)
NEUTS SEG # BLD: 3 K/UL (ref 1.8–8)
NEUTS SEG NFR BLD: 45 % (ref 32–75)
PLATELET # BLD AUTO: 266 K/UL (ref 150–400)
PMV BLD AUTO: 10.1 FL (ref 8.9–12.9)
POTASSIUM SERPL-SCNC: 4.4 MMOL/L (ref 3.5–5.1)
PROCALCITONIN SERPL-MCNC: 0.15 NG/ML
PROT SERPL-MCNC: 8.1 G/DL (ref 6.4–8.2)
RBC # BLD AUTO: 4.83 M/UL (ref 3.8–5.2)
SODIUM SERPL-SCNC: 136 MMOL/L (ref 136–145)
TROPONIN I SERPL HS-MCNC: <4 NG/L (ref 0–51)
WBC # BLD AUTO: 6.5 K/UL (ref 3.6–11)

## 2023-06-19 PROCEDURE — 93005 ELECTROCARDIOGRAM TRACING: CPT | Performed by: NURSE PRACTITIONER

## 2023-06-19 PROCEDURE — 6370000000 HC RX 637 (ALT 250 FOR IP): Performed by: NURSE PRACTITIONER

## 2023-06-19 PROCEDURE — 80053 COMPREHEN METABOLIC PANEL: CPT

## 2023-06-19 PROCEDURE — 83605 ASSAY OF LACTIC ACID: CPT

## 2023-06-19 PROCEDURE — 96375 TX/PRO/DX INJ NEW DRUG ADDON: CPT

## 2023-06-19 PROCEDURE — 71275 CT ANGIOGRAPHY CHEST: CPT

## 2023-06-19 PROCEDURE — 2580000003 HC RX 258: Performed by: NURSE PRACTITIONER

## 2023-06-19 PROCEDURE — 71045 X-RAY EXAM CHEST 1 VIEW: CPT

## 2023-06-19 PROCEDURE — 85025 COMPLETE CBC W/AUTO DIFF WBC: CPT

## 2023-06-19 PROCEDURE — 84484 ASSAY OF TROPONIN QUANT: CPT

## 2023-06-19 PROCEDURE — 96365 THER/PROPH/DIAG IV INF INIT: CPT

## 2023-06-19 PROCEDURE — 6360000002 HC RX W HCPCS: Performed by: NURSE PRACTITIONER

## 2023-06-19 PROCEDURE — 1100000000 HC RM PRIVATE

## 2023-06-19 PROCEDURE — 84145 PROCALCITONIN (PCT): CPT

## 2023-06-19 PROCEDURE — 6360000004 HC RX CONTRAST MEDICATION: Performed by: NURSE PRACTITIONER

## 2023-06-19 PROCEDURE — 87804 INFLUENZA ASSAY W/OPTIC: CPT

## 2023-06-19 PROCEDURE — 87040 BLOOD CULTURE FOR BACTERIA: CPT

## 2023-06-19 PROCEDURE — 99285 EMERGENCY DEPT VISIT HI MDM: CPT

## 2023-06-19 RX ORDER — 0.9 % SODIUM CHLORIDE 0.9 %
1000 INTRAVENOUS SOLUTION INTRAVENOUS ONCE
Status: COMPLETED | OUTPATIENT
Start: 2023-06-19 | End: 2023-06-19

## 2023-06-19 RX ORDER — ONDANSETRON 2 MG/ML
4 INJECTION INTRAMUSCULAR; INTRAVENOUS ONCE
Status: COMPLETED | OUTPATIENT
Start: 2023-06-19 | End: 2023-06-19

## 2023-06-19 RX ORDER — IPRATROPIUM BROMIDE AND ALBUTEROL SULFATE 2.5; .5 MG/3ML; MG/3ML
1 SOLUTION RESPIRATORY (INHALATION)
Status: COMPLETED | OUTPATIENT
Start: 2023-06-19 | End: 2023-06-19

## 2023-06-19 RX ADMIN — CEFTRIAXONE SODIUM 1000 MG: 1 INJECTION, POWDER, FOR SOLUTION INTRAMUSCULAR; INTRAVENOUS at 17:20

## 2023-06-19 RX ADMIN — SODIUM CHLORIDE 1000 ML: 9 INJECTION, SOLUTION INTRAVENOUS at 17:20

## 2023-06-19 RX ADMIN — ONDANSETRON 4 MG: 2 INJECTION INTRAMUSCULAR; INTRAVENOUS at 17:28

## 2023-06-19 RX ADMIN — IPRATROPIUM BROMIDE AND ALBUTEROL SULFATE 1 DOSE: 2.5; .5 SOLUTION RESPIRATORY (INHALATION) at 17:17

## 2023-06-19 RX ADMIN — IOPAMIDOL 100 ML: 755 INJECTION, SOLUTION INTRAVENOUS at 19:33

## 2023-06-19 RX ADMIN — AZITHROMYCIN MONOHYDRATE 500 MG: 500 INJECTION, POWDER, LYOPHILIZED, FOR SOLUTION INTRAVENOUS at 17:27

## 2023-06-19 RX ADMIN — METHYLPREDNISOLONE SODIUM SUCCINATE 60 MG: 125 INJECTION, POWDER, FOR SOLUTION INTRAMUSCULAR; INTRAVENOUS at 17:20

## 2023-06-19 ASSESSMENT — PAIN DESCRIPTION - LOCATION: LOCATION: HEAD

## 2023-06-19 ASSESSMENT — PAIN SCALES - GENERAL: PAINLEVEL_OUTOF10: 4

## 2023-06-19 ASSESSMENT — PAIN - FUNCTIONAL ASSESSMENT: PAIN_FUNCTIONAL_ASSESSMENT: 0-10

## 2023-06-19 NOTE — ED TRIAGE NOTES
PT endorses hx of asthma, onset of shortness of breath, fever chills, cough and hypoxia. Pt states her symptoms are the same from when she had pneumonia in feb.

## 2023-06-19 NOTE — ED PROVIDER NOTES
software. Quite often unanticipated grammatical, syntax, homophones, and other interpretive errors are inadvertently transcribed by the computer software. Please disregards these errors.  Please excuse any errors that have escaped final proofreading.)     ЕЛЕНА Andino NP  06/19/23 2014

## 2023-06-20 PROBLEM — J16.0 COMMUNITY ACQUIRED PNEUMONIA DUE TO CHLAMYDIA SPECIES: Status: ACTIVE | Noted: 2023-06-20

## 2023-06-20 LAB
EST. AVERAGE GLUCOSE BLD GHB EST-MCNC: 229 MG/DL
GLUCOSE BLD STRIP.AUTO-MCNC: 337 MG/DL (ref 65–100)
GLUCOSE BLD STRIP.AUTO-MCNC: 354 MG/DL (ref 65–100)
GLUCOSE BLD STRIP.AUTO-MCNC: 372 MG/DL (ref 65–100)
GLUCOSE BLD STRIP.AUTO-MCNC: 406 MG/DL (ref 65–100)
GLUCOSE BLD STRIP.AUTO-MCNC: 408 MG/DL (ref 65–100)
GLUCOSE BLD STRIP.AUTO-MCNC: 477 MG/DL (ref 65–100)
HBA1C MFR BLD: 9.6 % (ref 4–5.6)
PERFORMED BY:: ABNORMAL
SARS-COV-2 RDRP RESP QL NAA+PROBE: NOT DETECTED

## 2023-06-20 PROCEDURE — 6360000002 HC RX W HCPCS: Performed by: INTERNAL MEDICINE

## 2023-06-20 PROCEDURE — 6370000000 HC RX 637 (ALT 250 FOR IP): Performed by: INTERNAL MEDICINE

## 2023-06-20 PROCEDURE — 83036 HEMOGLOBIN GLYCOSYLATED A1C: CPT

## 2023-06-20 PROCEDURE — 2700000000 HC OXYGEN THERAPY PER DAY

## 2023-06-20 PROCEDURE — 2580000003 HC RX 258: Performed by: FAMILY MEDICINE

## 2023-06-20 PROCEDURE — 1100000000 HC RM PRIVATE

## 2023-06-20 PROCEDURE — 87635 SARS-COV-2 COVID-19 AMP PRB: CPT

## 2023-06-20 PROCEDURE — 6370000000 HC RX 637 (ALT 250 FOR IP): Performed by: FAMILY MEDICINE

## 2023-06-20 PROCEDURE — 94640 AIRWAY INHALATION TREATMENT: CPT

## 2023-06-20 PROCEDURE — 82962 GLUCOSE BLOOD TEST: CPT

## 2023-06-20 PROCEDURE — 82785 ASSAY OF IGE: CPT

## 2023-06-20 PROCEDURE — 6360000002 HC RX W HCPCS: Performed by: FAMILY MEDICINE

## 2023-06-20 RX ORDER — POLYETHYLENE GLYCOL 3350 17 G/17G
17 POWDER, FOR SOLUTION ORAL DAILY PRN
Status: DISCONTINUED | OUTPATIENT
Start: 2023-06-20 | End: 2023-06-23 | Stop reason: HOSPADM

## 2023-06-20 RX ORDER — SODIUM CHLORIDE 9 MG/ML
INJECTION, SOLUTION INTRAVENOUS PRN
Status: DISCONTINUED | OUTPATIENT
Start: 2023-06-20 | End: 2023-06-23 | Stop reason: HOSPADM

## 2023-06-20 RX ORDER — INSULIN LISPRO 100 [IU]/ML
20 INJECTION, SOLUTION INTRAVENOUS; SUBCUTANEOUS ONCE
Status: COMPLETED | OUTPATIENT
Start: 2023-06-20 | End: 2023-06-20

## 2023-06-20 RX ORDER — ONDANSETRON 2 MG/ML
4 INJECTION INTRAMUSCULAR; INTRAVENOUS EVERY 6 HOURS PRN
Status: DISCONTINUED | OUTPATIENT
Start: 2023-06-20 | End: 2023-06-23 | Stop reason: HOSPADM

## 2023-06-20 RX ORDER — ACETAMINOPHEN 325 MG/1
650 TABLET ORAL EVERY 6 HOURS PRN
Status: DISCONTINUED | OUTPATIENT
Start: 2023-06-20 | End: 2023-06-23 | Stop reason: HOSPADM

## 2023-06-20 RX ORDER — FLUCONAZOLE 100 MG/1
100 TABLET ORAL DAILY
Status: DISCONTINUED | OUTPATIENT
Start: 2023-06-20 | End: 2023-06-23 | Stop reason: HOSPADM

## 2023-06-20 RX ORDER — DEXTROSE MONOHYDRATE 100 MG/ML
INJECTION, SOLUTION INTRAVENOUS CONTINUOUS PRN
Status: DISCONTINUED | OUTPATIENT
Start: 2023-06-20 | End: 2023-06-23 | Stop reason: HOSPADM

## 2023-06-20 RX ORDER — SODIUM CHLORIDE 9 MG/ML
INJECTION, SOLUTION INTRAVENOUS CONTINUOUS
Status: DISCONTINUED | OUTPATIENT
Start: 2023-06-20 | End: 2023-06-23 | Stop reason: HOSPADM

## 2023-06-20 RX ORDER — IPRATROPIUM BROMIDE AND ALBUTEROL SULFATE 2.5; .5 MG/3ML; MG/3ML
1 SOLUTION RESPIRATORY (INHALATION)
Status: DISCONTINUED | OUTPATIENT
Start: 2023-06-20 | End: 2023-06-23 | Stop reason: HOSPADM

## 2023-06-20 RX ORDER — IPRATROPIUM BROMIDE AND ALBUTEROL SULFATE 2.5; .5 MG/3ML; MG/3ML
1 SOLUTION RESPIRATORY (INHALATION) EVERY 4 HOURS PRN
Status: DISCONTINUED | OUTPATIENT
Start: 2023-06-20 | End: 2023-06-23 | Stop reason: HOSPADM

## 2023-06-20 RX ORDER — INSULIN LISPRO 100 [IU]/ML
0-4 INJECTION, SOLUTION INTRAVENOUS; SUBCUTANEOUS NIGHTLY
Status: DISCONTINUED | OUTPATIENT
Start: 2023-06-20 | End: 2023-06-23 | Stop reason: HOSPADM

## 2023-06-20 RX ORDER — INSULIN LISPRO 100 [IU]/ML
0-4 INJECTION, SOLUTION INTRAVENOUS; SUBCUTANEOUS NIGHTLY
Status: DISCONTINUED | OUTPATIENT
Start: 2023-06-20 | End: 2023-06-20

## 2023-06-20 RX ORDER — INSULIN LISPRO 100 [IU]/ML
0-16 INJECTION, SOLUTION INTRAVENOUS; SUBCUTANEOUS
Status: DISCONTINUED | OUTPATIENT
Start: 2023-06-20 | End: 2023-06-23 | Stop reason: HOSPADM

## 2023-06-20 RX ORDER — INSULIN GLARGINE 100 [IU]/ML
50 INJECTION, SOLUTION SUBCUTANEOUS NIGHTLY
Status: DISCONTINUED | OUTPATIENT
Start: 2023-06-20 | End: 2023-06-23 | Stop reason: HOSPADM

## 2023-06-20 RX ORDER — ONDANSETRON 4 MG/1
4 TABLET, ORALLY DISINTEGRATING ORAL EVERY 8 HOURS PRN
Status: DISCONTINUED | OUTPATIENT
Start: 2023-06-20 | End: 2023-06-23 | Stop reason: HOSPADM

## 2023-06-20 RX ORDER — ACETYLCYSTEINE 200 MG/ML
600 SOLUTION ORAL; RESPIRATORY (INHALATION) 2 TIMES DAILY
Status: DISCONTINUED | OUTPATIENT
Start: 2023-06-20 | End: 2023-06-21

## 2023-06-20 RX ORDER — INSULIN LISPRO 100 [IU]/ML
0-4 INJECTION, SOLUTION INTRAVENOUS; SUBCUTANEOUS
Status: DISCONTINUED | OUTPATIENT
Start: 2023-06-20 | End: 2023-06-22

## 2023-06-20 RX ORDER — SODIUM CHLORIDE 0.9 % (FLUSH) 0.9 %
5-40 SYRINGE (ML) INJECTION PRN
Status: DISCONTINUED | OUTPATIENT
Start: 2023-06-20 | End: 2023-06-23 | Stop reason: HOSPADM

## 2023-06-20 RX ORDER — ALBUTEROL SULFATE 90 UG/1
2 AEROSOL, METERED RESPIRATORY (INHALATION) EVERY 4 HOURS PRN
Status: DISCONTINUED | OUTPATIENT
Start: 2023-06-20 | End: 2023-06-23 | Stop reason: HOSPADM

## 2023-06-20 RX ORDER — ACETAMINOPHEN 650 MG/1
650 SUPPOSITORY RECTAL EVERY 6 HOURS PRN
Status: DISCONTINUED | OUTPATIENT
Start: 2023-06-20 | End: 2023-06-23 | Stop reason: HOSPADM

## 2023-06-20 RX ORDER — SODIUM CHLORIDE 0.9 % (FLUSH) 0.9 %
5-40 SYRINGE (ML) INJECTION EVERY 12 HOURS SCHEDULED
Status: DISCONTINUED | OUTPATIENT
Start: 2023-06-20 | End: 2023-06-23 | Stop reason: HOSPADM

## 2023-06-20 RX ORDER — DEXTROSE AND SODIUM CHLORIDE 5; .45 G/100ML; G/100ML
INJECTION, SOLUTION INTRAVENOUS CONTINUOUS
Status: DISCONTINUED | OUTPATIENT
Start: 2023-06-20 | End: 2023-06-20

## 2023-06-20 RX ADMIN — INSULIN LISPRO 3 UNITS: 100 INJECTION, SOLUTION INTRAVENOUS; SUBCUTANEOUS at 10:18

## 2023-06-20 RX ADMIN — PIPERACILLIN AND TAZOBACTAM 3375 MG: 3; .375 INJECTION, POWDER, LYOPHILIZED, FOR SOLUTION INTRAVENOUS at 17:15

## 2023-06-20 RX ADMIN — INSULIN LISPRO 16 UNITS: 100 INJECTION, SOLUTION INTRAVENOUS; SUBCUTANEOUS at 18:25

## 2023-06-20 RX ADMIN — METHYLPREDNISOLONE SODIUM SUCCINATE 40 MG: 40 INJECTION, POWDER, FOR SOLUTION INTRAMUSCULAR; INTRAVENOUS at 13:13

## 2023-06-20 RX ADMIN — SODIUM CHLORIDE: 9 INJECTION, SOLUTION INTRAVENOUS at 17:10

## 2023-06-20 RX ADMIN — SODIUM CHLORIDE, PRESERVATIVE FREE 10 ML: 5 INJECTION INTRAVENOUS at 20:41

## 2023-06-20 RX ADMIN — ACETYLCYSTEINE 600 MG: 200 SOLUTION ORAL; RESPIRATORY (INHALATION) at 11:22

## 2023-06-20 RX ADMIN — AZITHROMYCIN DIHYDRATE 500 MG: 500 INJECTION, POWDER, LYOPHILIZED, FOR SOLUTION INTRAVENOUS at 23:08

## 2023-06-20 RX ADMIN — IPRATROPIUM BROMIDE AND ALBUTEROL SULFATE 1 DOSE: .5; 2.5 SOLUTION RESPIRATORY (INHALATION) at 08:22

## 2023-06-20 RX ADMIN — INSULIN LISPRO 16 UNITS: 100 INJECTION, SOLUTION INTRAVENOUS; SUBCUTANEOUS at 13:13

## 2023-06-20 RX ADMIN — INSULIN LISPRO 2 UNITS: 100 INJECTION, SOLUTION INTRAVENOUS; SUBCUTANEOUS at 01:32

## 2023-06-20 RX ADMIN — IPRATROPIUM BROMIDE AND ALBUTEROL SULFATE 1 DOSE: 2.5; .5 SOLUTION RESPIRATORY (INHALATION) at 20:44

## 2023-06-20 RX ADMIN — INSULIN LISPRO 20 UNITS: 100 INJECTION, SOLUTION INTRAVENOUS; SUBCUTANEOUS at 21:43

## 2023-06-20 RX ADMIN — IPRATROPIUM BROMIDE AND ALBUTEROL SULFATE 1 DOSE: 2.5; .5 SOLUTION RESPIRATORY (INHALATION) at 15:13

## 2023-06-20 RX ADMIN — PIPERACILLIN AND TAZOBACTAM 4500 MG: 4; .5 INJECTION, POWDER, LYOPHILIZED, FOR SOLUTION INTRAVENOUS at 01:37

## 2023-06-20 RX ADMIN — METHYLPREDNISOLONE SODIUM SUCCINATE 40 MG: 40 INJECTION, POWDER, FOR SOLUTION INTRAMUSCULAR; INTRAVENOUS at 23:08

## 2023-06-20 RX ADMIN — INSULIN GLARGINE 50 UNITS: 100 INJECTION, SOLUTION SUBCUTANEOUS at 20:41

## 2023-06-20 RX ADMIN — ACETYLCYSTEINE 600 MG: 200 SOLUTION ORAL; RESPIRATORY (INHALATION) at 20:44

## 2023-06-20 RX ADMIN — PIPERACILLIN AND TAZOBACTAM 3375 MG: 3; .375 INJECTION, POWDER, LYOPHILIZED, FOR SOLUTION INTRAVENOUS at 10:19

## 2023-06-20 RX ADMIN — SODIUM CHLORIDE, PRESERVATIVE FREE 10 ML: 5 INJECTION INTRAVENOUS at 18:32

## 2023-06-20 RX ADMIN — FLUCONAZOLE 100 MG: 100 TABLET ORAL at 21:43

## 2023-06-20 RX ADMIN — METHYLPREDNISOLONE SODIUM SUCCINATE 40 MG: 40 INJECTION, POWDER, FOR SOLUTION INTRAMUSCULAR; INTRAVENOUS at 18:30

## 2023-06-20 ASSESSMENT — PAIN SCALES - GENERAL
PAINLEVEL_OUTOF10: 0

## 2023-06-20 ASSESSMENT — ENCOUNTER SYMPTOMS
WHEEZING: 1
COUGH: 1
SHORTNESS OF BREATH: 1
GASTROINTESTINAL NEGATIVE: 1
EYES NEGATIVE: 1

## 2023-06-20 NOTE — CONSULTS
PULMONARY CONSULT  [facility]    Name: Quintin Adames MRN: [id number]   : 1980 Hospital: [facility] [place]   Date: 2023  Admission date: 2023 Hospital Day: 2       HPI:     Patient Active Problem List   Diagnosis    Diabetes ([address]    Asthma    Hypoxia             [x] High complexity decision making was performed  [x] See my orders for details      Subjective/Initial History:     I was asked by Mir Waters MD to see Quintin Adames  a 38 y.o.    female in consultation     Excerpts from admission 2023 or consult notes as follows:   40-year-old lady came in because of shortness of breath dyspnea cough she has past medical history of asthma she was admitted previously earlier this year with pneumonia and also asthma and she was discharged home now she came in CAT scan of the chest was done negative for PE but shows left upper lobe collapse and recommended radiologist bronchoscopy she is coughing up greenish-yellow sputum denies any history of chest pain no history of fever and chills no history of smoking her mother and all has asthma she use inhalers and nebulizer at home so came to the hospital got admitted and pulm consult      No Known Allergies     MAR reviewed and pertinent medications noted or modified as needed     Current Facility-Administered Medications   Medication Dose Route Frequency Provider Last Rate Last Admin    piperacillin-tazobactam (ZOSYN) 3,375 mg in sodium chloride 0.9 % 50 mL IVPB (mini-bag)  3,375 mg IntraVENous Q8H Robbie Watson MD 12.5 mL/hr at 23 1019 3,375 mg at 23 1019    ipratropium 0.5 mg-albuterol 2.5 mg (DUONEB) nebulizer solution 1 Dose  1 Dose Inhalation Q4H PRN Shankar Watson MD   1 Dose at 23 0822    glucose chewable tablet 16 g  4 tablet Oral PRN Robbie Watson MD        dextrose bolus 10% 125 mL  125 mL IntraVENous PRN Shankar Watson MD        Or    dextrose bolus 10% 250 mL  250 mL IntraVENous

## 2023-06-20 NOTE — CARE COORDINATION
06/20/23 1401   Service Assessment   Patient Orientation Alert and Oriented   Cognition Alert   History Provided By Patient   Primary Caregiver Self   Support Systems Friends/Neighbors; Family Members   Patient's Rah Ji is: Legal Next of Alexandra Vidal   PCP Verified by CM Yes   Prior Functional Level Independent in ADLs/IADLs   Current Functional Level Independent in ADLs/IADLs   Can patient return to prior living arrangement Yes   Ability to make needs known: Good   Family able to assist with home care needs: Other (comment)  (No needs)   Would you like for me to discuss the discharge plan with any other family members/significant others, and if so, who? No   Financial Resources None   [employer] None   Social/Functional History   Lives With Other (comment)  (Children)   ADL Assistance Independent   Ambulation Assistance Independent   Discharge Planning   Type of Residence House   Current Services Prior To Admission Other (Comment)  (Nebulizer)   Patient expects to be discharged to: House     Cm met with pt at the bedside to complete discharge assessment. Cm verified home address and emergency contact -  Theresa Arriaga (friend). Pt reports living at home with her children. Pt ambulates without DME and is not current with home health. Pt has a nebulizer. Pharmacy - [address] [facility]. Pt would like RX sent to [facility] at the time of discharge.

## 2023-06-20 NOTE — ED NOTES
TRANSFER - OUT REPORT:    Verbal report given to Evangelist Pavon RN(name) on Kerwin Will  being transferred to [facility](unit) for routine progression of patient care       Report consisted of patients Situation, Background, Assessment and   Recommendations(SBAR). Information from the following report(s) Nurse Handoff Report, ED SBAR, Adult Overview, MAR, and Recent Results was reviewed with the receiving nurse. Opportunity for questions and clarification was provided. Patient transported with:   Monitor  O2 @ 2 liters    Lines: 20G RAC        This SmartLink retrieves the last documented value for LDA assessment data, retrieved by either LDA type or by LDA group ID. This SmartLink should be used in a SmartText or SmartPhrase. If one is not available, please contact your .             Abigail Wiggins RN  06/19/23 1822

## 2023-06-20 NOTE — H&P
History and Physical    NAME:   Aruna Cornelius   :  1980   MRN:  [id number]     Date/Time: 2023 11:06 AM    Patient PCP: Debo Tolbert MD  ______________________________________________________________________       Subjective:     CHIEF COMPLAINT:     Shortness of breath        HISTORY OF PRESENT ILLNESS:       Patient is a 38y.o. year old female morbidly obese history of diabetes asthma came to emergency room complaining of shortness of breath for last 3 days patient admitted in the past earlier this year for pneumonia came to emergency room because of shortness of breath chest pain nausea nausea vomiting diarrhea constipation seen by the ER physician CT scan of the chest done which was negative for PE but shows collapse left upper lobe and recommend bronchoscopy patient is on IV antibiotic be admitted to medical floor for further evaluation treatment    Past Medical History:   Diagnosis Date    Arthritis     back    Asthma     Chronic pain     back pains/ disc compression    Diabetes ([address]     Hypertension     Morbid obesity ([facility] [address]         Past Surgical History:   Procedure Laterality Date     SECTION      IR ABLATION VEIN EXT INITIAL      x2 per pt       Social History     Tobacco Use    Smoking status: Never    Smokeless tobacco: Never   Substance Use Topics    Alcohol use: Yes        Family History   Problem Relation Age of Onset    Diabetes Mother     Heart Disease Mother     Stroke Mother     Cancer Paternal Grandmother     Hypertension Mother        No Known Allergies     Prior to Admission medications    Medication Sig Start Date End Date Taking?  Authorizing Provider   albuterol sulfate HFA (PROVENTIL;VENTOLIN;PROAIR) 108 (90 Base) MCG/ACT inhaler Inhale into the lungs    Ar Automatic Reconciliation   atorvastatin (LIPITOR) 10 MG tablet Take 1 tablet by mouth daily  Patient not taking: Reported on 2023   Ar Automatic Reconciliation   furosemide (LASIX) 40 MG

## 2023-06-21 ENCOUNTER — APPOINTMENT (OUTPATIENT)
Facility: HOSPITAL | Age: 43
DRG: 143 | End: 2023-06-21
Attending: INTERNAL MEDICINE
Payer: COMMERCIAL

## 2023-06-21 ENCOUNTER — ANESTHESIA (OUTPATIENT)
Facility: HOSPITAL | Age: 43
DRG: 143 | End: 2023-06-21
Payer: COMMERCIAL

## 2023-06-21 ENCOUNTER — ANESTHESIA EVENT (OUTPATIENT)
Facility: HOSPITAL | Age: 43
DRG: 143 | End: 2023-06-21
Payer: COMMERCIAL

## 2023-06-21 LAB
ALBUMIN SERPL-MCNC: 3 G/DL (ref 3.5–5)
ALBUMIN/GLOB SERPL: 0.6 (ref 1.1–2.2)
ALP SERPL-CCNC: 75 U/L (ref 45–117)
ALT SERPL-CCNC: 17 U/L (ref 12–78)
ANION GAP SERPL CALC-SCNC: 6 MMOL/L (ref 5–15)
AST SERPL W P-5'-P-CCNC: 10 U/L (ref 15–37)
BASOPHILS # BLD: 0 K/UL (ref 0–0.1)
BASOPHILS NFR BLD: 0 % (ref 0–1)
BILIRUB SERPL-MCNC: 0.2 MG/DL (ref 0.2–1)
BUN SERPL-MCNC: 11 MG/DL (ref 6–20)
BUN/CREAT SERPL: 15 (ref 12–20)
CA-I BLD-MCNC: 9.7 MG/DL (ref 8.5–10.1)
CHLORIDE SERPL-SCNC: 102 MMOL/L (ref 97–108)
CO2 SERPL-SCNC: 28 MMOL/L (ref 21–32)
CREAT SERPL-MCNC: 0.71 MG/DL (ref 0.55–1.02)
DIFFERENTIAL METHOD BLD: ABNORMAL
EKG ATRIAL RATE: 111 BPM
EKG DIAGNOSIS: NORMAL
EKG P AXIS: 47 DEGREES
EKG P-R INTERVAL: 140 MS
EKG Q-T INTERVAL: 320 MS
EKG QRS DURATION: 72 MS
EKG QTC CALCULATION (BAZETT): 435 MS
EKG R AXIS: 25 DEGREES
EKG T AXIS: 62 DEGREES
EKG VENTRICULAR RATE: 111 BPM
EOSINOPHIL # BLD: 0 K/UL (ref 0–0.4)
EOSINOPHIL NFR BLD: 0 % (ref 0–7)
ERYTHROCYTE [DISTWIDTH] IN BLOOD BY AUTOMATED COUNT: 14.6 % (ref 11.5–14.5)
GLOBULIN SER CALC-MCNC: 5.2 G/DL (ref 2–4)
GLUCOSE BLD STRIP.AUTO-MCNC: 319 MG/DL (ref 65–100)
GLUCOSE BLD STRIP.AUTO-MCNC: 330 MG/DL (ref 65–100)
GLUCOSE BLD STRIP.AUTO-MCNC: 349 MG/DL (ref 65–100)
GLUCOSE BLD STRIP.AUTO-MCNC: 364 MG/DL (ref 65–100)
GLUCOSE BLD STRIP.AUTO-MCNC: 416 MG/DL (ref 65–100)
GLUCOSE BLD STRIP.AUTO-MCNC: 436 MG/DL (ref 65–100)
GLUCOSE SERPL-MCNC: 351 MG/DL (ref 65–100)
HCT VFR BLD AUTO: 40.3 % (ref 35–47)
HGB BLD-MCNC: 13.1 G/DL (ref 11.5–16)
IMM GRANULOCYTES # BLD AUTO: 0 K/UL (ref 0–0.04)
IMM GRANULOCYTES NFR BLD AUTO: 0 % (ref 0–0.5)
LYMPHOCYTES # BLD: 1.9 K/UL (ref 0.8–3.5)
LYMPHOCYTES NFR BLD: 28 % (ref 12–49)
MCH RBC QN AUTO: 28.5 PG (ref 26–34)
MCHC RBC AUTO-ENTMCNC: 32.5 G/DL (ref 30–36.5)
MCV RBC AUTO: 87.6 FL (ref 80–99)
MONOCYTES # BLD: 0.3 K/UL (ref 0–1)
MONOCYTES NFR BLD: 4 % (ref 5–13)
NEUTS SEG # BLD: 4.5 K/UL (ref 1.8–8)
NEUTS SEG NFR BLD: 68 % (ref 32–75)
NRBC # BLD: 0 K/UL (ref 0–0.01)
NRBC BLD-RTO: 0 PER 100 WBC
PERFORMED BY:: ABNORMAL
PLATELET # BLD AUTO: 285 K/UL (ref 150–400)
PMV BLD AUTO: 10.6 FL (ref 8.9–12.9)
POTASSIUM SERPL-SCNC: 4.4 MMOL/L (ref 3.5–5.1)
PROT SERPL-MCNC: 8.2 G/DL (ref 6.4–8.2)
RBC # BLD AUTO: 4.6 M/UL (ref 3.8–5.2)
SODIUM SERPL-SCNC: 136 MMOL/L (ref 136–145)
WBC # BLD AUTO: 6.6 K/UL (ref 3.6–11)

## 2023-06-21 PROCEDURE — 6360000002 HC RX W HCPCS: Performed by: FAMILY MEDICINE

## 2023-06-21 PROCEDURE — 7100000010 HC PHASE II RECOVERY - FIRST 15 MIN: Performed by: INTERNAL MEDICINE

## 2023-06-21 PROCEDURE — 85025 COMPLETE CBC W/AUTO DIFF WBC: CPT

## 2023-06-21 PROCEDURE — 6360000002 HC RX W HCPCS: Performed by: INTERNAL MEDICINE

## 2023-06-21 PROCEDURE — 2709999900 HC NON-CHARGEABLE SUPPLY: Performed by: INTERNAL MEDICINE

## 2023-06-21 PROCEDURE — 1100000000 HC RM PRIVATE

## 2023-06-21 PROCEDURE — 82962 GLUCOSE BLOOD TEST: CPT

## 2023-06-21 PROCEDURE — 0B9B8ZZ DRAINAGE OF LEFT LOWER LOBE BRONCHUS, VIA NATURAL OR ARTIFICIAL OPENING ENDOSCOPIC: ICD-10-PCS | Performed by: INTERNAL MEDICINE

## 2023-06-21 PROCEDURE — 0B9F8ZX DRAINAGE OF RIGHT LOWER LUNG LOBE, VIA NATURAL OR ARTIFICIAL OPENING ENDOSCOPIC, DIAGNOSTIC: ICD-10-PCS | Performed by: INTERNAL MEDICINE

## 2023-06-21 PROCEDURE — 87102 FUNGUS ISOLATION CULTURE: CPT

## 2023-06-21 PROCEDURE — 3600007512: Performed by: INTERNAL MEDICINE

## 2023-06-21 PROCEDURE — 3700000000 HC ANESTHESIA ATTENDED CARE: Performed by: INTERNAL MEDICINE

## 2023-06-21 PROCEDURE — 88112 CYTOPATH CELL ENHANCE TECH: CPT

## 2023-06-21 PROCEDURE — 3700000001 HC ADD 15 MINUTES (ANESTHESIA): Performed by: INTERNAL MEDICINE

## 2023-06-21 PROCEDURE — 0BC48ZZ EXTIRPATION OF MATTER FROM RIGHT UPPER LOBE BRONCHUS, VIA NATURAL OR ARTIFICIAL OPENING ENDOSCOPIC: ICD-10-PCS | Performed by: INTERNAL MEDICINE

## 2023-06-21 PROCEDURE — 71045 X-RAY EXAM CHEST 1 VIEW: CPT

## 2023-06-21 PROCEDURE — 94640 AIRWAY INHALATION TREATMENT: CPT

## 2023-06-21 PROCEDURE — 3600007502: Performed by: INTERNAL MEDICINE

## 2023-06-21 PROCEDURE — 6370000000 HC RX 637 (ALT 250 FOR IP): Performed by: FAMILY MEDICINE

## 2023-06-21 PROCEDURE — 0B9J8ZX DRAINAGE OF LEFT LOWER LUNG LOBE, VIA NATURAL OR ARTIFICIAL OPENING ENDOSCOPIC, DIAGNOSTIC: ICD-10-PCS | Performed by: INTERNAL MEDICINE

## 2023-06-21 PROCEDURE — 7100000011 HC PHASE II RECOVERY - ADDTL 15 MIN: Performed by: INTERNAL MEDICINE

## 2023-06-21 PROCEDURE — 87206 SMEAR FLUORESCENT/ACID STAI: CPT

## 2023-06-21 PROCEDURE — 0B948ZZ DRAINAGE OF RIGHT UPPER LOBE BRONCHUS, VIA NATURAL OR ARTIFICIAL OPENING ENDOSCOPIC: ICD-10-PCS | Performed by: INTERNAL MEDICINE

## 2023-06-21 PROCEDURE — 2580000003 HC RX 258: Performed by: FAMILY MEDICINE

## 2023-06-21 PROCEDURE — 2500000003 HC RX 250 WO HCPCS: Performed by: NURSE ANESTHETIST, CERTIFIED REGISTERED

## 2023-06-21 PROCEDURE — 36415 COLL VENOUS BLD VENIPUNCTURE: CPT

## 2023-06-21 PROCEDURE — 6360000002 HC RX W HCPCS: Performed by: NURSE ANESTHETIST, CERTIFIED REGISTERED

## 2023-06-21 PROCEDURE — 87116 MYCOBACTERIA CULTURE: CPT

## 2023-06-21 PROCEDURE — 80053 COMPREHEN METABOLIC PANEL: CPT

## 2023-06-21 PROCEDURE — 6370000000 HC RX 637 (ALT 250 FOR IP): Performed by: INTERNAL MEDICINE

## 2023-06-21 RX ORDER — DIPHENHYDRAMINE HYDROCHLORIDE 50 MG/ML
12.5 INJECTION INTRAMUSCULAR; INTRAVENOUS
Status: DISCONTINUED | OUTPATIENT
Start: 2023-06-21 | End: 2023-06-21 | Stop reason: HOSPADM

## 2023-06-21 RX ORDER — OXYCODONE HYDROCHLORIDE 5 MG/1
5 TABLET ORAL PRN
Status: DISCONTINUED | OUTPATIENT
Start: 2023-06-21 | End: 2023-06-21 | Stop reason: HOSPADM

## 2023-06-21 RX ORDER — ACETYLCYSTEINE 200 MG/ML
600 SOLUTION ORAL; RESPIRATORY (INHALATION)
Status: DISCONTINUED | OUTPATIENT
Start: 2023-06-21 | End: 2023-06-23 | Stop reason: HOSPADM

## 2023-06-21 RX ORDER — FENTANYL CITRATE 50 UG/ML
50 INJECTION, SOLUTION INTRAMUSCULAR; INTRAVENOUS EVERY 5 MIN PRN
Status: DISCONTINUED | OUTPATIENT
Start: 2023-06-21 | End: 2023-06-21 | Stop reason: HOSPADM

## 2023-06-21 RX ORDER — ONDANSETRON 2 MG/ML
INJECTION INTRAMUSCULAR; INTRAVENOUS
Status: DISPENSED
Start: 2023-06-21 | End: 2023-06-21

## 2023-06-21 RX ORDER — SODIUM CHLORIDE, SODIUM LACTATE, POTASSIUM CHLORIDE, CALCIUM CHLORIDE 600; 310; 30; 20 MG/100ML; MG/100ML; MG/100ML; MG/100ML
INJECTION, SOLUTION INTRAVENOUS ONCE
Status: DISCONTINUED | OUTPATIENT
Start: 2023-06-21 | End: 2023-06-21 | Stop reason: HOSPADM

## 2023-06-21 RX ORDER — SUCCINYLCHOLINE/SOD CL,ISO/PF 200MG/10ML
SYRINGE (ML) INTRAVENOUS PRN
Status: DISCONTINUED | OUTPATIENT
Start: 2023-06-21 | End: 2023-06-21 | Stop reason: SDUPTHER

## 2023-06-21 RX ORDER — SODIUM CHLORIDE 0.9 % (FLUSH) 0.9 %
5-40 SYRINGE (ML) INJECTION EVERY 12 HOURS SCHEDULED
Status: DISCONTINUED | OUTPATIENT
Start: 2023-06-21 | End: 2023-06-21 | Stop reason: HOSPADM

## 2023-06-21 RX ORDER — LABETALOL HYDROCHLORIDE 5 MG/ML
10 INJECTION, SOLUTION INTRAVENOUS
Status: DISCONTINUED | OUTPATIENT
Start: 2023-06-21 | End: 2023-06-21 | Stop reason: HOSPADM

## 2023-06-21 RX ORDER — ROCURONIUM BROMIDE 10 MG/ML
INJECTION, SOLUTION INTRAVENOUS
Status: COMPLETED
Start: 2023-06-21 | End: 2023-06-21

## 2023-06-21 RX ORDER — LIDOCAINE HYDROCHLORIDE 20 MG/ML
INJECTION, SOLUTION EPIDURAL; INFILTRATION; INTRACAUDAL; PERINEURAL
Status: COMPLETED
Start: 2023-06-21 | End: 2023-06-21

## 2023-06-21 RX ORDER — SODIUM CHLORIDE 0.9 % (FLUSH) 0.9 %
5-40 SYRINGE (ML) INJECTION PRN
Status: DISCONTINUED | OUTPATIENT
Start: 2023-06-21 | End: 2023-06-21 | Stop reason: HOSPADM

## 2023-06-21 RX ORDER — ROCURONIUM BROMIDE 10 MG/ML
INJECTION, SOLUTION INTRAVENOUS PRN
Status: DISCONTINUED | OUTPATIENT
Start: 2023-06-21 | End: 2023-06-21 | Stop reason: SDUPTHER

## 2023-06-21 RX ORDER — LORAZEPAM 2 MG/ML
0.5 INJECTION INTRAMUSCULAR
Status: DISCONTINUED | OUTPATIENT
Start: 2023-06-21 | End: 2023-06-21 | Stop reason: HOSPADM

## 2023-06-21 RX ORDER — PROPOFOL 10 MG/ML
INJECTION, EMULSION INTRAVENOUS
Status: COMPLETED
Start: 2023-06-21 | End: 2023-06-21

## 2023-06-21 RX ORDER — HYDROMORPHONE HYDROCHLORIDE 1 MG/ML
0.5 INJECTION, SOLUTION INTRAMUSCULAR; INTRAVENOUS; SUBCUTANEOUS EVERY 5 MIN PRN
Status: DISCONTINUED | OUTPATIENT
Start: 2023-06-21 | End: 2023-06-21 | Stop reason: HOSPADM

## 2023-06-21 RX ORDER — ONDANSETRON 2 MG/ML
4 INJECTION INTRAMUSCULAR; INTRAVENOUS
Status: DISCONTINUED | OUTPATIENT
Start: 2023-06-21 | End: 2023-06-21 | Stop reason: HOSPADM

## 2023-06-21 RX ORDER — SUCCINYLCHOLINE/SOD CL,ISO/PF 200MG/10ML
SYRINGE (ML) INTRAVENOUS
Status: COMPLETED
Start: 2023-06-21 | End: 2023-06-21

## 2023-06-21 RX ORDER — MEPERIDINE HYDROCHLORIDE 25 MG/ML
12.5 INJECTION INTRAMUSCULAR; INTRAVENOUS; SUBCUTANEOUS EVERY 5 MIN PRN
Status: DISCONTINUED | OUTPATIENT
Start: 2023-06-21 | End: 2023-06-21 | Stop reason: HOSPADM

## 2023-06-21 RX ORDER — OXYCODONE HYDROCHLORIDE 5 MG/1
10 TABLET ORAL PRN
Status: DISCONTINUED | OUTPATIENT
Start: 2023-06-21 | End: 2023-06-21 | Stop reason: HOSPADM

## 2023-06-21 RX ORDER — IPRATROPIUM BROMIDE AND ALBUTEROL SULFATE 2.5; .5 MG/3ML; MG/3ML
1 SOLUTION RESPIRATORY (INHALATION)
Status: DISCONTINUED | OUTPATIENT
Start: 2023-06-21 | End: 2023-06-21 | Stop reason: HOSPADM

## 2023-06-21 RX ORDER — PROPOFOL 10 MG/ML
INJECTION, EMULSION INTRAVENOUS
Status: DISPENSED
Start: 2023-06-21 | End: 2023-06-22

## 2023-06-21 RX ORDER — SODIUM CHLORIDE 9 MG/ML
INJECTION, SOLUTION INTRAVENOUS PRN
Status: DISCONTINUED | OUTPATIENT
Start: 2023-06-21 | End: 2023-06-21 | Stop reason: HOSPADM

## 2023-06-21 RX ORDER — INSULIN LISPRO 100 [IU]/ML
10 INJECTION, SOLUTION INTRAVENOUS; SUBCUTANEOUS ONCE
Status: COMPLETED | OUTPATIENT
Start: 2023-06-21 | End: 2023-06-21

## 2023-06-21 RX ORDER — METOCLOPRAMIDE HYDROCHLORIDE 5 MG/ML
10 INJECTION INTRAMUSCULAR; INTRAVENOUS
Status: DISCONTINUED | OUTPATIENT
Start: 2023-06-21 | End: 2023-06-21 | Stop reason: HOSPADM

## 2023-06-21 RX ORDER — DEXAMETHASONE SODIUM PHOSPHATE 4 MG/ML
INJECTION, SOLUTION INTRA-ARTICULAR; INTRALESIONAL; INTRAMUSCULAR; INTRAVENOUS; SOFT TISSUE
Status: DISPENSED
Start: 2023-06-21 | End: 2023-06-21

## 2023-06-21 RX ORDER — HYDRALAZINE HYDROCHLORIDE 20 MG/ML
10 INJECTION INTRAMUSCULAR; INTRAVENOUS
Status: DISCONTINUED | OUTPATIENT
Start: 2023-06-21 | End: 2023-06-21 | Stop reason: HOSPADM

## 2023-06-21 RX ADMIN — IPRATROPIUM BROMIDE AND ALBUTEROL SULFATE 1 DOSE: 2.5; .5 SOLUTION RESPIRATORY (INHALATION) at 07:29

## 2023-06-21 RX ADMIN — PIPERACILLIN AND TAZOBACTAM 3375 MG: 3; .375 INJECTION, POWDER, LYOPHILIZED, FOR SOLUTION INTRAVENOUS at 23:54

## 2023-06-21 RX ADMIN — LIDOCAINE HYDROCHLORIDE 5 ML: 20 INJECTION, SOLUTION EPIDURAL; INFILTRATION; INTRACAUDAL; PERINEURAL at 12:08

## 2023-06-21 RX ADMIN — INSULIN LISPRO 10 UNITS: 100 INJECTION, SOLUTION INTRAVENOUS; SUBCUTANEOUS at 19:03

## 2023-06-21 RX ADMIN — SODIUM CHLORIDE, PRESERVATIVE FREE 10 ML: 5 INJECTION INTRAVENOUS at 09:12

## 2023-06-21 RX ADMIN — METHYLPREDNISOLONE SODIUM SUCCINATE 40 MG: 40 INJECTION, POWDER, FOR SOLUTION INTRAMUSCULAR; INTRAVENOUS at 22:11

## 2023-06-21 RX ADMIN — PIPERACILLIN AND TAZOBACTAM 3375 MG: 3; .375 INJECTION, POWDER, LYOPHILIZED, FOR SOLUTION INTRAVENOUS at 09:11

## 2023-06-21 RX ADMIN — ACETYLCYSTEINE 600 MG: 200 SOLUTION ORAL; RESPIRATORY (INHALATION) at 07:29

## 2023-06-21 RX ADMIN — PIPERACILLIN AND TAZOBACTAM 3375 MG: 3; .375 INJECTION, POWDER, LYOPHILIZED, FOR SOLUTION INTRAVENOUS at 15:35

## 2023-06-21 RX ADMIN — SODIUM CHLORIDE, PRESERVATIVE FREE 10 ML: 5 INJECTION INTRAVENOUS at 20:28

## 2023-06-21 RX ADMIN — PROPOFOL 200 MG: 10 INJECTION, EMULSION INTRAVENOUS at 12:08

## 2023-06-21 RX ADMIN — SODIUM CHLORIDE: 9 INJECTION, SOLUTION INTRAVENOUS at 05:52

## 2023-06-21 RX ADMIN — INSULIN GLARGINE 50 UNITS: 100 INJECTION, SOLUTION SUBCUTANEOUS at 20:27

## 2023-06-21 RX ADMIN — Medication 160 MG: at 12:08

## 2023-06-21 RX ADMIN — METHYLPREDNISOLONE SODIUM SUCCINATE 40 MG: 40 INJECTION, POWDER, FOR SOLUTION INTRAMUSCULAR; INTRAVENOUS at 05:50

## 2023-06-21 RX ADMIN — PIPERACILLIN AND TAZOBACTAM 3375 MG: 3; .375 INJECTION, POWDER, LYOPHILIZED, FOR SOLUTION INTRAVENOUS at 00:59

## 2023-06-21 RX ADMIN — ROCURONIUM BROMIDE 5 MG: 10 INJECTION, SOLUTION INTRAVENOUS at 12:08

## 2023-06-21 RX ADMIN — IPRATROPIUM BROMIDE AND ALBUTEROL SULFATE 1 DOSE: 2.5; .5 SOLUTION RESPIRATORY (INHALATION) at 23:02

## 2023-06-21 RX ADMIN — INSULIN LISPRO 4 UNITS: 100 INJECTION, SOLUTION INTRAVENOUS; SUBCUTANEOUS at 20:27

## 2023-06-21 RX ADMIN — FLUCONAZOLE 100 MG: 100 TABLET ORAL at 09:12

## 2023-06-21 RX ADMIN — INSULIN LISPRO 12 UNITS: 100 INJECTION, SOLUTION INTRAVENOUS; SUBCUTANEOUS at 09:10

## 2023-06-21 RX ADMIN — IPRATROPIUM BROMIDE AND ALBUTEROL SULFATE 1 DOSE: 2.5; .5 SOLUTION RESPIRATORY (INHALATION) at 13:25

## 2023-06-21 ASSESSMENT — PAIN SCALES - GENERAL: PAINLEVEL_OUTOF10: 0

## 2023-06-21 ASSESSMENT — ENCOUNTER SYMPTOMS
WHEEZING: 1
SHORTNESS OF BREATH: 1
COUGH: 1
EYES NEGATIVE: 1
GASTROINTESTINAL NEGATIVE: 1

## 2023-06-21 NOTE — ANESTHESIA PRE PROCEDURE
SubCUTAneous PRN Hayden Starr MD        dextrose 10 % infusion   IntraVENous Continuous PRN Hayden Starr MD        0.9 % sodium chloride infusion   IntraVENous Continuous Hayden Starr MD 75 mL/hr at 23 0552 New Bag at 23 0552    sodium chloride flush 0.9 % injection 5-40 mL  5-40 mL IntraVENous 2 times per day Hayden Starr MD   10 mL at 23 0912    sodium chloride flush 0.9 % injection 5-40 mL  5-40 mL IntraVENous PRN Jesus Watson MD        0.9 % sodium chloride infusion   IntraVENous PRN Hayden Starr MD        ondansetron (ZOFRAN-ODT) disintegrating tablet 4 mg  4 mg Oral Q8H PRN Hayden Starr MD        Or    ondansetron [facility]) injection 4 mg  4 mg IntraVENous Q6H PRN Hayden Starr MD        polyethylene glycol (GLYCOLAX) packet 17 g  17 g Oral Daily PRN Hayden Starr MD        acetaminophen (TYLENOL) tablet 650 mg  650 mg Oral Q6H PRN Hayden Starr MD        Or    acetaminophen (TYLENOL) suppository 650 mg  650 mg Rectal Q6H PRN Hayden Starr MD        methylPREDNISolone sodium (PF) (SOLU-MEDROL PF) injection 40 mg  40 mg IntraVENous Q6H Robbie Watson MD   40 mg at 23 0550    fluconazole (DIFLUCAN) tablet 100 mg  100 mg Oral Daily Jesus Watson MD   100 mg at 23 9061       Allergies:  No Known Allergies    Problem List:    Patient Active Problem List   Diagnosis Code    Diabetes ([facility] [address] E11.9    Asthma J45.909    Hypoxia R09.02    Community acquired pneumonia due to Chlamydia species J16.0       Past Medical History:        Diagnosis Date    Arthritis     back    Asthma     Chronic pain     back pains/ disc compression    Diabetes ([facility]     Hypertension     Morbid obesity ([facility] [address] [facility]        Past Surgical History:        Procedure Laterality Date     SECTION      IR ABLATION VEIN EXT INITIAL      x2 per pt       Social History:    Social History     Tobacco Use    Smoking status: Never    Smokeless tobacco:

## 2023-06-21 NOTE — PROCEDURES
BRONCHOSCOPY  [facility]    Name: Walker Leyva MRN: [id number]   : 1980 Hospital: [facility]   Date: 2023        Bronchoscopy Procedure Note    PROCEDURE:  DIAGNOSTIC/THERAPEUTIC BRONCHOSCOPY    INDICATION:  Left upper lobe collapse hypoxia  Asthma  ASSISTANTS:  Nurse Zahra Torres  ANESTHESIA:  General anesthesia intubated as per anesthesia    C/T-CHEST/CXR FINDINGS:  Near-complete opacification of the left upper lobe likely representing a  combination of infection and atelectasis. There is left upper lobe bronchus  narrowing or obstruction for which further evaluation with bronchoscopy could be  considered. DESCRIPTION OF PROCEDURE:  After obtaining an informed consent, the patient was brought to the Bronchoscopy Suite with appropriate isolation related to endoscopy suite policy the patient had appropriate oxygen, blood pressure, heart rate, and respiratory rate monitoring applied and monitored continuously throughout the procedure. Patient was intubated as per anesthesia according to the endoscopy policy. The huey showed mild evidence of erythema and moderate amounts of clear frothy secretions. These were suctioned clear. The bronchoscope was then advanced through the huey, which was sharp. Then advanced into the left main stem and each segment, subsegement in the left upper lingula and lower lobe was visualized. There was mild tracheobronchitis with mild friability throughout. There was modest amounts of white secretion.  Bronchoscope was advanced through the endotracheal tube unable to see nasopharynx vocal cord trachea huey was visualized severe right edema at the huey bronchoscope was advanced to the right mainstem bronchus right lower lobe right middle lobe right edema but no endobronchial mass lesion seen right upper lobe narrowing thick mucus plugging was seen suctioning was done suction the right female was seen normal saline was given 10 cc good return obtain

## 2023-06-21 NOTE — CARE COORDINATION
0815: Chart reviewed. Per notes; patient followed via pulmonology and on IV ABX and IV steroids.     CM will continue to follow patient and recs of medical team.

## 2023-06-21 NOTE — ANESTHESIA POSTPROCEDURE EVALUATION
Department of Anesthesiology  Postprocedure Note    Patient: Sandrita Haas  MRN: [id number]  YOB: 1980  Date of evaluation: 6/21/2023      Procedure Summary     Date: 06/21/23 Room / Location: [facility] ENDO 04 / [facility] ENDOSCOPY    Anesthesia Start: 1202 Anesthesia Stop: 2965    Procedures:       BRONCHOSCOPY ([facility])      BRONCHOSCOPY BRUSHINGS ([facility]) Diagnosis:       Collapse of left lung      (Collapse of left lung [J98.11])    Surgeons:  Boogie Pickard MD Responsible Provider: Celia Ann MD    Anesthesia Type: General ASA Status: 4          Anesthesia Type: General    Anabel Phase I: Anabel Score: 10    Anabel Phase II:        Anesthesia Post Evaluation    Patient location during evaluation: PACU  Patient participation: complete - patient participated  Level of consciousness: sleepy but conscious  Pain score: 0  Airway patency: patent  Nausea & Vomiting: no nausea and no vomiting  Complications: no  Cardiovascular status: hemodynamically stable  Respiratory status: acceptable  Hydration status: stable  Multimodal analgesia pain management approach

## 2023-06-22 LAB
GLUCOSE BLD STRIP.AUTO-MCNC: 319 MG/DL (ref 65–100)
GLUCOSE BLD STRIP.AUTO-MCNC: 341 MG/DL (ref 65–100)
GLUCOSE BLD STRIP.AUTO-MCNC: 345 MG/DL (ref 65–100)
GLUCOSE BLD STRIP.AUTO-MCNC: 362 MG/DL (ref 65–100)
GLUCOSE BLD STRIP.AUTO-MCNC: 386 MG/DL (ref 65–100)
GLUCOSE BLD STRIP.AUTO-MCNC: 450 MG/DL (ref 65–100)
PERFORMED BY:: ABNORMAL

## 2023-06-22 PROCEDURE — 6360000002 HC RX W HCPCS: Performed by: INTERNAL MEDICINE

## 2023-06-22 PROCEDURE — 6370000000 HC RX 637 (ALT 250 FOR IP): Performed by: FAMILY MEDICINE

## 2023-06-22 PROCEDURE — 94640 AIRWAY INHALATION TREATMENT: CPT

## 2023-06-22 PROCEDURE — 6360000002 HC RX W HCPCS: Performed by: FAMILY MEDICINE

## 2023-06-22 PROCEDURE — 1100000000 HC RM PRIVATE

## 2023-06-22 PROCEDURE — 2580000003 HC RX 258: Performed by: FAMILY MEDICINE

## 2023-06-22 PROCEDURE — 82962 GLUCOSE BLOOD TEST: CPT

## 2023-06-22 PROCEDURE — 94761 N-INVAS EAR/PLS OXIMETRY MLT: CPT

## 2023-06-22 PROCEDURE — 6370000000 HC RX 637 (ALT 250 FOR IP): Performed by: INTERNAL MEDICINE

## 2023-06-22 RX ORDER — PREDNISONE 20 MG/1
20 TABLET ORAL 2 TIMES DAILY
Status: DISCONTINUED | OUTPATIENT
Start: 2023-06-22 | End: 2023-06-23 | Stop reason: HOSPADM

## 2023-06-22 RX ADMIN — PIPERACILLIN AND TAZOBACTAM 3375 MG: 3; .375 INJECTION, POWDER, LYOPHILIZED, FOR SOLUTION INTRAVENOUS at 08:27

## 2023-06-22 RX ADMIN — IPRATROPIUM BROMIDE AND ALBUTEROL SULFATE 1 DOSE: 2.5; .5 SOLUTION RESPIRATORY (INHALATION) at 14:01

## 2023-06-22 RX ADMIN — INSULIN LISPRO 4 UNITS: 100 INJECTION, SOLUTION INTRAVENOUS; SUBCUTANEOUS at 21:03

## 2023-06-22 RX ADMIN — INSULIN LISPRO 12 UNITS: 100 INJECTION, SOLUTION INTRAVENOUS; SUBCUTANEOUS at 11:34

## 2023-06-22 RX ADMIN — ACETYLCYSTEINE 600 MG: 200 SOLUTION ORAL; RESPIRATORY (INHALATION) at 14:01

## 2023-06-22 RX ADMIN — PIPERACILLIN AND TAZOBACTAM 3375 MG: 3; .375 INJECTION, POWDER, LYOPHILIZED, FOR SOLUTION INTRAVENOUS at 23:53

## 2023-06-22 RX ADMIN — METHYLPREDNISOLONE SODIUM SUCCINATE 40 MG: 40 INJECTION, POWDER, FOR SOLUTION INTRAMUSCULAR; INTRAVENOUS at 10:24

## 2023-06-22 RX ADMIN — PREDNISONE 20 MG: 20 TABLET ORAL at 21:03

## 2023-06-22 RX ADMIN — IPRATROPIUM BROMIDE AND ALBUTEROL SULFATE 1 DOSE: 2.5; .5 SOLUTION RESPIRATORY (INHALATION) at 19:44

## 2023-06-22 RX ADMIN — INSULIN LISPRO 16 UNITS: 100 INJECTION, SOLUTION INTRAVENOUS; SUBCUTANEOUS at 18:16

## 2023-06-22 RX ADMIN — FLUCONAZOLE 100 MG: 100 TABLET ORAL at 08:26

## 2023-06-22 RX ADMIN — SODIUM CHLORIDE: 9 INJECTION, SOLUTION INTRAVENOUS at 05:52

## 2023-06-22 RX ADMIN — ACETYLCYSTEINE 600 MG: 200 SOLUTION ORAL; RESPIRATORY (INHALATION) at 09:08

## 2023-06-22 RX ADMIN — ACETYLCYSTEINE 600 MG: 200 SOLUTION ORAL; RESPIRATORY (INHALATION) at 19:44

## 2023-06-22 RX ADMIN — SODIUM CHLORIDE, PRESERVATIVE FREE 10 ML: 5 INJECTION INTRAVENOUS at 23:56

## 2023-06-22 RX ADMIN — PIPERACILLIN AND TAZOBACTAM 3375 MG: 3; .375 INJECTION, POWDER, LYOPHILIZED, FOR SOLUTION INTRAVENOUS at 15:38

## 2023-06-22 RX ADMIN — IPRATROPIUM BROMIDE AND ALBUTEROL SULFATE 1 DOSE: 2.5; .5 SOLUTION RESPIRATORY (INHALATION) at 09:08

## 2023-06-22 RX ADMIN — INSULIN GLARGINE 50 UNITS: 100 INJECTION, SOLUTION SUBCUTANEOUS at 21:10

## 2023-06-22 RX ADMIN — INSULIN LISPRO 16 UNITS: 100 INJECTION, SOLUTION INTRAVENOUS; SUBCUTANEOUS at 08:25

## 2023-06-22 ASSESSMENT — ENCOUNTER SYMPTOMS
SHORTNESS OF BREATH: 1
EYES NEGATIVE: 1
GASTROINTESTINAL NEGATIVE: 1
COUGH: 1

## 2023-06-22 NOTE — PLAN OF CARE
Problem: Discharge Planning  Goal: Discharge to home or other facility with appropriate resources  6/22/2023 1926 by Love Jones RN  Outcome: Progressing  6/22/2023 1113 by Sarah Arana RN  Outcome: Progressing     Problem: Pain  Goal: Verbalizes/displays adequate comfort level or baseline comfort level  6/22/2023 1926 by Love Jones RN  Outcome: Progressing  6/22/2023 1113 by Sarah Arana RN  Outcome: Progressing     Problem: Chronic Conditions and Co-morbidities  Goal: Patient's chronic conditions and co-morbidity symptoms are monitored and maintained or improved  6/22/2023 1926 by Love Jones RN  Outcome: Progressing  6/22/2023 1113 by Sarah Arana RN  Outcome: Progressing     Problem: Safety - Adult  Goal: Free from fall injury  6/22/2023 1926 by Love Jones RN  Outcome: Progressing  6/22/2023 1113 by Sarah Arana RN  Outcome: Progressing

## 2023-06-22 NOTE — PLAN OF CARE
Problem: Pain  Goal: Verbalizes/displays adequate comfort level or baseline comfort level  6/21/2023 2222 by Jessica Mazariegos RN  Outcome: Progressing  6/21/2023 1740 by Eulogio Fagan LPN  Outcome: Progressing     Problem: Chronic Conditions and Co-morbidities  Goal: Patient's chronic conditions and co-morbidity symptoms are monitored and maintained or improved  Outcome: Progressing     Problem: Safety - Adult  Goal: Free from fall injury  6/21/2023 2222 by Jessica Mazariegos RN  Outcome: Progressing  6/21/2023 1740 by Eulogio Fagan LPN  Outcome: Progressing

## 2023-06-22 NOTE — PLAN OF CARE
Problem: Discharge Planning  Goal: Discharge to home or other facility with appropriate resources  Outcome: Progressing     Problem: Pain  Goal: Verbalizes/displays adequate comfort level or baseline comfort level  6/22/2023 1113 by Carol Dunbar RN  Outcome: Progressing  6/21/2023 2222 by Jac Monte RN  Outcome: Progressing     Problem: Chronic Conditions and Co-morbidities  Goal: Patient's chronic conditions and co-morbidity symptoms are monitored and maintained or improved  6/22/2023 1113 by Carol Dunbar RN  Outcome: Progressing  6/21/2023 2222 by Jac Monte RN  Outcome: Progressing     Problem: Safety - Adult  Goal: Free from fall injury  6/22/2023 1113 by Carol Dunbar RN  Outcome: Progressing  6/21/2023 2222 by Jac Monte RN  Outcome: Progressing

## 2023-06-23 VITALS
HEIGHT: 65 IN | WEIGHT: 293 LBS | OXYGEN SATURATION: 92 % | RESPIRATION RATE: 20 BRPM | TEMPERATURE: 97.9 F | SYSTOLIC BLOOD PRESSURE: 148 MMHG | BODY MASS INDEX: 48.82 KG/M2 | DIASTOLIC BLOOD PRESSURE: 88 MMHG | HEART RATE: 100 BPM

## 2023-06-23 PROBLEM — J16.0 COMMUNITY ACQUIRED PNEUMONIA DUE TO CHLAMYDIA SPECIES: Status: RESOLVED | Noted: 2023-06-20 | Resolved: 2023-06-23

## 2023-06-23 PROBLEM — R09.02 HYPOXIA: Status: RESOLVED | Noted: 2023-06-19 | Resolved: 2023-06-23

## 2023-06-23 LAB
GLUCOSE BLD STRIP.AUTO-MCNC: 213 MG/DL (ref 65–100)
GLUCOSE BLD STRIP.AUTO-MCNC: 250 MG/DL (ref 65–100)
PERFORMED BY:: ABNORMAL
PERFORMED BY:: ABNORMAL

## 2023-06-23 PROCEDURE — 6370000000 HC RX 637 (ALT 250 FOR IP): Performed by: FAMILY MEDICINE

## 2023-06-23 PROCEDURE — 94640 AIRWAY INHALATION TREATMENT: CPT

## 2023-06-23 PROCEDURE — 6370000000 HC RX 637 (ALT 250 FOR IP): Performed by: INTERNAL MEDICINE

## 2023-06-23 PROCEDURE — 2580000003 HC RX 258: Performed by: FAMILY MEDICINE

## 2023-06-23 PROCEDURE — 6360000002 HC RX W HCPCS: Performed by: FAMILY MEDICINE

## 2023-06-23 PROCEDURE — 94667 MNPJ CHEST WALL 1ST: CPT

## 2023-06-23 PROCEDURE — 94761 N-INVAS EAR/PLS OXIMETRY MLT: CPT

## 2023-06-23 PROCEDURE — 94668 MNPJ CHEST WALL SBSQ: CPT

## 2023-06-23 PROCEDURE — 6360000002 HC RX W HCPCS: Performed by: INTERNAL MEDICINE

## 2023-06-23 PROCEDURE — 82962 GLUCOSE BLOOD TEST: CPT

## 2023-06-23 RX ORDER — FLUCONAZOLE 100 MG/1
100 TABLET ORAL DAILY
Qty: 7 TABLET | Refills: 0 | Status: SHIPPED | OUTPATIENT
Start: 2023-06-24 | End: 2023-07-01

## 2023-06-23 RX ORDER — IPRATROPIUM BROMIDE AND ALBUTEROL SULFATE 2.5; .5 MG/3ML; MG/3ML
3 SOLUTION RESPIRATORY (INHALATION) EVERY 4 HOURS PRN
Qty: 360 ML | Refills: 1 | Status: SHIPPED | OUTPATIENT
Start: 2023-06-23

## 2023-06-23 RX ORDER — PREDNISONE 20 MG/1
20 TABLET ORAL 2 TIMES DAILY
Qty: 20 TABLET | Refills: 0 | Status: SHIPPED | OUTPATIENT
Start: 2023-06-23 | End: 2023-07-03

## 2023-06-23 RX ORDER — AMOXICILLIN AND CLAVULANATE POTASSIUM 875; 125 MG/1; MG/1
1 TABLET, FILM COATED ORAL 2 TIMES DAILY
Qty: 14 TABLET | Refills: 0 | Status: SHIPPED | OUTPATIENT
Start: 2023-06-23 | End: 2023-06-30

## 2023-06-23 RX ADMIN — ACETYLCYSTEINE 600 MG: 200 SOLUTION ORAL; RESPIRATORY (INHALATION) at 08:21

## 2023-06-23 RX ADMIN — IPRATROPIUM BROMIDE AND ALBUTEROL SULFATE 1 DOSE: 2.5; .5 SOLUTION RESPIRATORY (INHALATION) at 12:53

## 2023-06-23 RX ADMIN — SODIUM CHLORIDE: 9 INJECTION, SOLUTION INTRAVENOUS at 07:15

## 2023-06-23 RX ADMIN — PREDNISONE 20 MG: 20 TABLET ORAL at 09:32

## 2023-06-23 RX ADMIN — INSULIN LISPRO 8 UNITS: 100 INJECTION, SOLUTION INTRAVENOUS; SUBCUTANEOUS at 09:35

## 2023-06-23 RX ADMIN — IPRATROPIUM BROMIDE AND ALBUTEROL SULFATE 1 DOSE: 2.5; .5 SOLUTION RESPIRATORY (INHALATION) at 08:20

## 2023-06-23 RX ADMIN — PIPERACILLIN AND TAZOBACTAM 3375 MG: 3; .375 INJECTION, POWDER, LYOPHILIZED, FOR SOLUTION INTRAVENOUS at 09:44

## 2023-06-23 RX ADMIN — ACETYLCYSTEINE 600 MG: 200 SOLUTION ORAL; RESPIRATORY (INHALATION) at 12:53

## 2023-06-23 RX ADMIN — FLUCONAZOLE 100 MG: 100 TABLET ORAL at 09:32

## 2023-06-23 ASSESSMENT — ENCOUNTER SYMPTOMS
EYES NEGATIVE: 1
COUGH: 1
SHORTNESS OF BREATH: 1
GASTROINTESTINAL NEGATIVE: 1

## 2023-06-23 NOTE — PROGRESS NOTES
Discharge instructions reviewed with patient to include meds and follow up appointments that need to be set. IV removed and dressing placed.  TB results still pending
Dr. Saima Martell notified o\f blood sugar 386, also notified that patient was noted to have two sliding scale orders for meals in orders. Order to discontinue low dose scale, cover with 16 units per sliding scale at this time and repeat blood sugar in one hour.
General Daily Progress Note      Patient Name:   Kiki Landaverde       YOB: 1980       Age:  38 y.o. Admit Date: 6/19/2023      Subjective:     Patient is a 38y.o. year old female morbidly obese history of diabetes asthma came to emergency room complaining of shortness of breath for last 3 days patient admitted in the past earlier this year for pneumonia came to emergency room because of shortness of breath chest pain nausea nausea vomiting diarrhea constipation seen by the ER physician CT scan of the chest done which was negative for PE but shows collapse left upper lobe and recommend bronchoscopy patient is on IV antibiotic be admitted to medical floor for further evaluation treatment    6/21    Patient feeling better this morning  Scheduled for bronchoscopy today             Objective:     Vitals:    06/21/23 0914   BP: (!) 153/95   Pulse: 91   Resp:    Temp: 97.4 °F (36.3 °C)   SpO2: 96%        Recent Results (from the past 24 hour(s))   POCT Glucose    Collection Time: 06/20/23 11:28 AM   Result Value Ref Range    POC Glucose 354 (H) 65 - 100 mg/dL    Performed by: Pan Belden    POCT Glucose    Collection Time: 06/20/23  4:26 PM   Result Value Ref Range    POC Glucose 406 (H) 65 - 100 mg/dL    Performed by:  Pan Belden    POCT Glucose    Collection Time: 06/20/23  8:34 PM   Result Value Ref Range    POC Glucose 477 (H) 65 - 100 mg/dL    Performed by: [employer]    POCT Glucose    Collection Time: 06/20/23 11:05 PM   Result Value Ref Range    POC Glucose 372 (H) 65 - 100 mg/dL    Performed by: [employer]    POCT Glucose    Collection Time: 06/21/23  5:49 AM   Result Value Ref Range    POC Glucose 319 (H) 65 - 100 mg/dL    Performed by: Canary Calendar    Comprehensive Metabolic Panel w/ Reflex to MG    Collection Time: 06/21/23  7:42 AM   Result Value Ref Range    Sodium 136 136 - 145 mmol/L    Potassium 4.4 3.5 - 5.1 mmol/L    Chloride 102 97 - 108 mmol/L    CO2 28 21 - 32
MD Watson notified of blood glucose level of 450. Orders received for to recheck glucose in an hour. No other orders at this time.
PULMONARY NOTE  [facility]    Name: Andreina Ferraro MRN: [id number]   : 1980 Hospital: [facility]   Date: 2023  Admission date: 2023 Hospital Day: 3       HPI:     Patient Active Problem List   Diagnosis    Diabetes ([address]    Asthma    Hypoxia    Community acquired pneumonia due to Chlamydia species             [x] High complexity decision making was performed  [x] See my orders for details      Subjective/Initial History:     I was asked by Pablo Pro MD to see Andreina Ferraro  a 38 y.o.    female in consultation     Excerpts from admission 2023 or consult notes as follows:   78-year-old lady came in because of shortness of breath dyspnea cough she has past medical history of asthma she was admitted previously earlier this year with pneumonia and also asthma and she was discharged home now she came in CAT scan of the chest was done negative for PE but shows left upper lobe collapse and recommended radiologist bronchoscopy she is coughing up greenish-yellow sputum denies any history of chest pain no history of fever and chills no history of smoking her mother and all has asthma she use inhalers and nebulizer at home so came to the hospital got admitted and pulm consult      No Known Allergies     MAR reviewed and pertinent medications noted or modified as needed     Current Facility-Administered Medications   Medication Dose Route Frequency Provider Last Rate Last Admin    succinylcholine (ANECTINE) 200 MG/10ML injection             sugammadex (BRIDION) 200 MG/2ML injection             dexamethasone (DECADRON) 4 MG/ML injection             ondansetron (ZOFRAN) 4 MG/2ML injection             lidocaine PF 2 % injection             rocuronium (ZEMURON) 50 MG/5ML injection             propofol 200 MG/20ML infusion             piperacillin-tazobactam (ZOSYN) 3,375 mg in sodium chloride 0.9 % 50 mL IVPB (mini-bag)  3,375 mg IntraVENous Q8H Robbie Watson
PULMONARY NOTE  [facility]    Name: Yady Tran MRN: [id number]   : 1980 Hospital: [facility]   Date: 2023  Admission date: 2023 Hospital Day: 4       HPI:     Patient Active Problem List   Diagnosis    Diabetes ([address]    Asthma    Hypoxia    Community acquired pneumonia due to Chlamydia species             [x] High complexity decision making was performed  [x] See my orders for details      Subjective/Initial History:     I was asked by Braeden Dhaliwal MD to see Yady Tran  a 38 y.o.    female in consultation     Excerpts from admission 2023 or consult notes as follows:   40-year-old lady came in because of shortness of breath dyspnea cough she has past medical history of asthma she was admitted previously earlier this year with pneumonia and also asthma and she was discharged home now she came in CAT scan of the chest was done negative for PE but shows left upper lobe collapse and recommended radiologist bronchoscopy she is coughing up greenish-yellow sputum denies any history of chest pain no history of fever and chills no history of smoking her mother and all has asthma she use inhalers and nebulizer at home so came to the hospital got admitted and pulm consult      No Known Allergies     MAR reviewed and pertinent medications noted or modified as needed     Current Facility-Administered Medications   Medication Dose Route Frequency Provider Last Rate Last Admin    acetylcysteine (MUCOMYST) 20 % solution 600 mg  600 mg Inhalation Q6H WA Gene Diaz MD   600 mg at 23 0908    methylPREDNISolone sodium (PF) (SOLU-MEDROL PF) injection 40 mg  40 mg IntraVENous Q8H Gene Diaz MD   40 mg at 23 1024    piperacillin-tazobactam (ZOSYN) 3,375 mg in sodium chloride 0.9 % 50 mL IVPB (mini-bag)  3,375 mg IntraVENous Q8H Robbie Watson MD 12.5 mL/hr at 23 0827 3,375 mg at 23 0827    ipratropium 0.5 mg-albuterol 2.5 mg
Patient off the floor going to ENDO.
Patients blood sugar was 364 per sliding scale required 16 Units of insulin which was given and place a call to Dr. Nicholas Rodriguez.
Patients blood sugar was 416, Placed a call to Dr. Lauro Acevedo received an order to give 10 Units and to re check in one hour.
Spiritual Care Assessment/Progress Note  [facility]    Name: Alexia Olvera MRN: [id number]    Age: 38 y.o. Sex: female   Language: English     Date: 6/22/2023            Total Time Calculated: 41 min              Spiritual Assessment begun in [facility] SURGICAL  Service Provided For[de-identified] Patient  Referral/Consult From[de-identified] Rounding  Encounter Overview/Reason : Spiritual/Emotional Needs, Initial Encounter    Spiritual beliefs:      [x] Involved in a soraida tradition/spiritual practice: Efrain Markham     [] Supported by a soraida community:      [] Claims no spiritual orientation:      [] Seeking spiritual identity:           [] Adheres to an individual form of spirituality:      [] Not able to assess:                Identified resources for coping and support system:   Support System: Children, Family members       [x] Prayer                  [] Devotional reading               [] Music                  [] Guided Imagery     [] Pet visits                                        [] Other: (COMMENT)     Specific area/focus of visit   Encounter: Type: Initial Screen/Assessment  Crisis:    Spiritual/Emotional needs: Type: Spiritual Support  Ritual, Rites and Sacraments:    Grief, Loss, and Adjustments: Type: Grief and loss  Ethics/Mediation:    Behavioral Health:    Palliative Care: Advance Care Planning:      Plan/Referrals: Other (Comment) (Zia Men is available if needed)    Narrative:  initiated Spiritual Care visit with Ms. Brandon Kerr while rounding on [address] for the purpose of completing a spiritual assessment. Ms. Brandon Kerr reports she is feeling much better. Pt shared details regarding her health condition, familial relationships, the loss of several family members since 2019, and her soraida. Pt says she is not anxious about her current situation because this situation is nothing compared to the loss she has experienced.  Ms. Brandon Kerr admits she kianna with her grief by focusing on her children, however, she
ALT 19 17   ALKPHOS 93 75   BILITOT 0.3 0.2   LABALBU 3.3* 3.0*   GLOB 4.8* 5.2*       No results for input(s): INR, PROTIME, APTT in the last 72 hours. No results for input(s): IRON, TIBC, FERRITIN in the last 72 hours. Invalid input(s): PSAT   No results found for: TYZNCCUX08, FOLATE, RBCF   No results for input(s): PH, PCO2, PO2 in the last 72 hours.   Recent Labs     06/19/23  1700   TROPHS <4       No results found for: CHOL, TRIG, HDL, LDLCHOLESTEROL, LDLCALC, LABVLDL, VLDL, CHOLHDLRATIO  Lab Results   Component Value Date/Time    POCGLU 386 06/22/2023 07:29 AM    POCGLU 341 06/22/2023 12:01 AM    POCGLU 436 06/21/2023 08:23 PM    POCGLU 416 06/21/2023 06:29 PM    POCGLU 364 06/21/2023 04:16 PM     No results found for: COLORU, CLARITYU, GLUCOSEU, BILIRUBINUR, KETUA, SPECGRAV, BLOODU, PHUR, PROTEINU, NITRU, LEUKOCYTESUR      Assessment:        Collapsed left upper lobe status post bronchoscopy  Postobstructive pneumonia  History of asthma  2 diabetes  Morbid obesity  Hyperglycemia secondary to steroids      Plan:     Patient on Mucomyst 600 every 6 hours  Flucanazole 100 mg daily  Lantus 50 units subcu at night  Sliding-scale insulin  Nebulizer treatment every 6 hours  IV Zosyn 3.375 IV every 8  Prednisone 20 mg daily    If stable possible discharge home tomorrow          Current Facility-Administered Medications:     predniSONE (DELTASONE) tablet 20 mg, 20 mg, Oral, BID, Gene Diaz MD    acetylcysteine (MUCOMYST) 20 % solution 600 mg, 600 mg, Inhalation, Q6H WA, Gene Diaz MD, 600 mg at 06/22/23 0908    piperacillin-tazobactam (ZOSYN) 3,375 mg in sodium chloride 0.9 % 50 mL IVPB (mini-bag), 3,375 mg, IntraVENous, Q8H, Robbie Watson MD, Last Rate: 12.5 mL/hr at 06/22/23 0827, 3,375 mg at 06/22/23 0827    ipratropium 0.5 mg-albuterol 2.5 mg (DUONEB) nebulizer solution 1 Dose, 1 Dose, Inhalation, Q4H PRN, Antonio Watson MD, 1 Dose at 06/20/23 0822    glucose chewable tablet 16 g, 4 tablet,
CHEST W WO CONTRAST    Result Date: 6/19/2023  EXAM:  CTA CHEST W WO CONTRAST INDICATION: Shortness of breath. Abnormal chest radiograph COMPARISON: Radiograph 3/50/5109 TECHNIQUE: Helical thin section chest CT following uneventful intravenous administration of nonionic contrast according to departmental PE protocol. Coronal and sagittal reformats were performed. 3D/MIP post processing was performed. CT dose reduction was achieved through use of a standardized protocol tailored for this examination and automatic exposure control for dose modulation. FINDINGS: This is a good quality study for the evaluation of pulmonary embolism to the first subsegmental arterial level. There is no pulmonary embolism to this level. The visualized thyroid gland is unremarkable. The aorta is normal in caliber. The main pulmonary artery is dilated measuring 3.7 cm in diameter in keeping with pulmonary artery hypertension. Cardiac size is within normal limits. No pericardial effusion. Several enlarged mediastinal lymph nodes are overall unchanged with a representative prevascular lymph node measuring 2.0 cm in short axis (series 501, image 19). There is airspace opacity and atelectasis in nearly the entire left upper lobe with left upper lobe bronchus narrowing or obstruction. There is mild patchy groundglass opacity in the left lower lobe. The right lung and pleural spaces are clear. There is no pneumothorax. Limited images of the upper abdomen are within normal limits. The bony structures are age-appropriate. 1. No acute pulmonary embolism. 2. Near-complete opacification of the left upper lobe likely representing a combination of infection and atelectasis. There is left upper lobe bronchus narrowing or obstruction for which further evaluation with bronchoscopy could be considered. 3. Mild patchy groundglass opacities in the left lower lobe suggesting nonspecific infection or inflammation.  4. Stable mild mediastinal lymphadenopathy,

## 2023-06-23 NOTE — CARE COORDINATION
DC Plan: Home    Transition of Care Plan:    RUR: 8%  Prior Level of Functioning: independent  Disposition: home  If SNF or IPR: Date FOC offered: N/A  Date FOC received: N/A  Accepting facility: N/A  Date authorization started with reference number: N/A  Date authorization received and expires: N/A  Follow up appointments: Yes  DME needed: None  Transportation at discharge: self/family  IM/IMM Medicare/ letter given: N/A  Is patient a [place] and connected with [facility]? If yes, was [employer] transfer form completed and [facility] notified? N/A  Caregiver Contact: N/A  Discharge Caregiver contacted prior to discharge? N/A  Care Conference needed?  No  Barriers to discharge: None

## 2023-06-23 NOTE — DISCHARGE INSTRUCTIONS
Discharge Instructions       PATIENT ID: Brandon Tapia  MRN: [id number]   YOB: 1980    DATE OF ADMISSION: 6/19/2023  DATE OF DISCHARGE: 6/23/2023    PRIMARY CARE PROVIDER: [unfilled]     ATTENDING PHYSICIAN: Lexy Wilcox MD   DISCHARGING PROVIDER: Lexy Wilcox MD    To contact this individual call [phone] and ask the  to page. If unavailable, ask to be transferred the Adult Hospitalist Department. DISCHARGE DIAGNOSES hypoxemia/collapsed lung mucous plug    CONSULTATIONS: [unfilled]      PROCEDURES/SURGERIES: Procedure(s):  BRONCHOSCOPY  BRONCHOSCOPY BRUSHINGS    PENDING TEST RESULTS:   At the time of discharge the following test results are still pending: None    FOLLOW UP APPOINTMENTS:   Cassius Busby, [facility] [id number] [phone]    Schedule an appointment as soon as possible for a visit in 1 week(s)      Yesenia Adamson MD  [address] [place] [address]  [phone]    Schedule an appointment as soon as possible for a visit         ADDITIONAL CARE RECOMMENDATIONS: Follow-up with pulmonologist    DIET: diabetic diet      ACTIVITY: activity as tolerated    Wound care: Wound Care Order:  submitted to Case Management. Please view [email or web address]     EQUIPMENT needed: ***      DISCHARGE MEDICATIONS:   See Medication Reconciliation Form    It is important that you take the medication exactly as they are prescribed. Keep your medication in the bottles provided by the pharmacist and keep a list of the medication names, dosages, and times to be taken in your wallet. Do not take other medications without consulting your doctor. NOTIFY YOUR PHYSICIAN FOR ANY OF THE FOLLOWING:   Fever over 101 degrees for 24 hours. Chest pain, shortness of breath, fever, chills, nausea, vomiting, diarrhea, change in mentation, falling, weakness, bleeding.  Severe pain or pain not relieved by

## 2023-06-23 NOTE — PLAN OF CARE
Problem: Discharge Planning  Goal: Discharge to home or other facility with appropriate resources  Outcome: Completed     Problem: Pain  Goal: Verbalizes/displays adequate comfort level or baseline comfort level  Outcome: Completed     Problem: Chronic Conditions and Co-morbidities  Goal: Patient's chronic conditions and co-morbidity symptoms are monitored and maintained or improved  Outcome: Completed     Problem: Safety - Adult  Goal: Free from fall injury  Outcome: Completed     Problem: Infection - Adult  Goal: Absence of infection at discharge  Outcome: Completed  Goal: Absence of infection during hospitalization  Outcome: Completed  Goal: Absence of fever/infection during anticipated neutropenic period  Outcome: Completed     Problem: Metabolic/Fluid and Electrolytes - Adult  Goal: Electrolytes maintained within normal limits  Outcome: Completed  Goal: Hemodynamic stability and optimal renal function maintained  Outcome: Completed  Goal: Glucose maintained within prescribed range  Outcome: Completed  Flowsheets (Taken 6/23/2023 0825)  Glucose maintained within prescribed range:   Monitor blood glucose as ordered   Assess for signs and symptoms of hyperglycemia and hypoglycemia   Administer ordered medications to maintain glucose within target range   Assess barriers to adequate nutritional intake and initiate nutrition consult as needed   Instruct patient on self management of diabetes and initiate consult as needed

## 2023-06-23 NOTE — DISCHARGE SUMMARY
Discharge Summary       PATIENT ID: Rah Max  MRN: [id number]   YOB: 1980    DATE OF ADMISSION: 6/19/2023   DATE OF DISCHARGE:   PRIMARY CARE PROVIDER: [unfilled]      ATTENDING PHYSICIAN: Charlee Kaur  DISCHARGING PROVIDER: Derryl Boast Mohiuddin      CONSULTATIONS: IP CONSULT TO PULMONOLOGY    PROCEDURES/SURGERIES: Procedure(s):  BRONCHOSCOPY  BRONCHOSCOPY BRUSHINGS    ADMITTING DIAGNOSES:    Patient Active Problem List    Diagnosis Date Noted    Di[address] [address]     Asthma        DISCHARGE DIAGNOSES / PLAN:      Collapsed left upper lobe status post bronchoscopy  Postobstructive pneumonia  History of asthma  2 diabetes  Morbid obesity  Hyperglycemia secondary to steroids:     Near-complete opacification of the left upper lobe likely representing a  combination of infection and atelectasis.  There is left upper lobe bronchus  narrowing or obstruction for which further evaluation with bronchoscopy could be  Considered    Post bronchoscopy procedure CXR shows reexpansion of the left upper lobe lingular segment   Estimated blood loss Minimal        DISCHARGE MEDICATIONS:     Medication List        START taking these medications      amoxicillin-clavulanate 875-125 MG per tablet  Commonly known as: AUGMENTIN  Take 1 tablet by mouth 2 times daily for 7 days     fluconazole 100 MG tablet  Commonly known as: DIFLUCAN  Take 1 tablet by mouth daily for 7 days  Start taking on: June 24, 2023     ipratropium 0.5 mg-albuterol 2.5 mg 0.5-2.5 (3) MG/3ML Soln nebulizer solution  Commonly known as: DUONEB  Inhale 3 mLs into the lungs every 4 hours as needed for Shortness of Breath     predniSONE 20 MG tablet  Commonly known as: DELTASONE  Take 1 tablet by mouth 2 times daily for 10 days            CONTINUE taking these medications      albuterol sulfate  (90 Base) MCG/ACT inhaler  Commonly known as: PROVENTIL;VENTOLIN;PROAIR     Lantus SoloStar 100 UNIT/ML injection pen  Generic drug: insulin glargine

## 2023-06-24 LAB
BACTERIA SPEC CULT: NORMAL
BACTERIA SPEC CULT: NORMAL
IGE SERPL-ACNC: 300 IU/ML (ref 6–495)
Lab: NORMAL
Lab: NORMAL

## 2023-06-26 LAB
BACTERIA SPEC CULT: NORMAL
Lab: NORMAL

## 2023-06-30 LAB
ACID FAST STN SPEC: NEGATIVE
MYCOBACTERIUM SPEC QL CULT: NORMAL
SPECIMEN PREPARATION: NORMAL
SPECIMEN SOURCE: NORMAL

## 2023-07-03 LAB
BACTERIA SPEC CULT: NORMAL
Lab: NORMAL

## 2023-07-10 LAB
BACTERIA SPEC CULT: NORMAL
Lab: NORMAL

## 2023-07-17 LAB
BACTERIA SPEC CULT: NORMAL
Lab: NORMAL

## 2023-07-24 LAB
BACTERIA SPEC CULT: NORMAL
Lab: NORMAL

## 2023-07-31 ENCOUNTER — TELEPHONE (OUTPATIENT)
Age: 43
End: 2023-07-31

## 2023-07-31 NOTE — TELEPHONE ENCOUNTER
Patient called stating that she had finished her requirements for surgery before Dr. Mckeon went on maternity leave and then patient stated that she was hospitalized. Patient says that she is ready to get back on track to get the surgery but needs to know what the next steps are.

## 2023-08-07 ENCOUNTER — OFFICE VISIT (OUTPATIENT)
Age: 43
End: 2023-08-07
Payer: MEDICAID

## 2023-08-07 VITALS
BODY MASS INDEX: 48.82 KG/M2 | RESPIRATION RATE: 15 BRPM | DIASTOLIC BLOOD PRESSURE: 88 MMHG | SYSTOLIC BLOOD PRESSURE: 150 MMHG | HEIGHT: 65 IN | WEIGHT: 293 LBS | OXYGEN SATURATION: 97 % | HEART RATE: 89 BPM | TEMPERATURE: 98.8 F

## 2023-08-07 DIAGNOSIS — Z79.4 TYPE 2 DIABETES MELLITUS WITHOUT COMPLICATION, WITH LONG-TERM CURRENT USE OF INSULIN (HCC): ICD-10-CM

## 2023-08-07 DIAGNOSIS — E11.9 TYPE 2 DIABETES MELLITUS WITHOUT COMPLICATION, WITH LONG-TERM CURRENT USE OF INSULIN (HCC): ICD-10-CM

## 2023-08-07 DIAGNOSIS — J45.20 MILD INTERMITTENT ASTHMA, UNCOMPLICATED: ICD-10-CM

## 2023-08-07 PROCEDURE — 99214 OFFICE O/P EST MOD 30 MIN: CPT | Performed by: SURGERY

## 2023-08-07 PROCEDURE — 3046F HEMOGLOBIN A1C LEVEL >9.0%: CPT | Performed by: SURGERY

## 2023-08-07 RX ORDER — ATORVASTATIN CALCIUM 10 MG/1
10 TABLET, FILM COATED ORAL DAILY
COMMUNITY
Start: 2023-07-07

## 2023-08-07 RX ORDER — ATORVASTATIN CALCIUM 10 MG/1
10 TABLET, FILM COATED ORAL DAILY
Qty: 30 TABLET | Refills: 11 | COMMUNITY
Start: 2023-07-07 | End: 2024-07-06

## 2023-08-07 RX ORDER — MELOXICAM 15 MG/1
TABLET ORAL
COMMUNITY
Start: 2022-11-03

## 2023-08-07 RX ORDER — INSULIN ASPART 100 [IU]/ML
INJECTION, SOLUTION INTRAVENOUS; SUBCUTANEOUS
COMMUNITY
Start: 2023-07-07

## 2023-08-07 RX ORDER — NORETHINDRONE AND ETHINYL ESTRADIOL 1 MG-35MCG
1 KIT ORAL DAILY
COMMUNITY
Start: 2023-07-13

## 2023-08-07 RX ORDER — PHENTERMINE HYDROCHLORIDE 37.5 MG/1
37.5 CAPSULE ORAL DAILY
COMMUNITY
Start: 2022-04-15

## 2023-08-07 RX ORDER — MEDROXYPROGESTERONE ACETATE 10 MG/1
TABLET ORAL
COMMUNITY
Start: 2023-08-04

## 2023-08-07 NOTE — PATIENT INSTRUCTIONS
[facility]  Timeline Overview of Bariatric Surgery Program    - Initial consult visit with surgeon  - Begin dietician consult/monthly supervised weight loss visits (typically for a minimum of 6 months)  - Complete all other requirements outlined in your letter while doing the monthly visits. - After your last monthly visit, Martin Cordova, Patient Care Coordinator, will confirm that all requirements have been completed and then contact you to set up a check-in visit with the surgeon. - Check-in visit with surgeon: if everything completed, we submit all paperwork to your insurance company for approval.  - Insurance approval can take up to 4 weeks, so surgeries are usually scheduled approximately 6-8 weeks after your check-in visit with the surgeon. - 4 weeks before surgery: pre-admission testing with lab work  - 3 weeks before surgery: final preoperative visit with surgeon  - 2-3 weeks before surgery: preoperative class  - Surgery!     If you have any questions about scheduling or paperwork, please contact:  Martin Cordova  Patient Care Coordinator  [phone]  [email or web address]

## 2023-08-12 LAB
ACID FAST STN SPEC: NEGATIVE
ACID FAST STN SPEC: NEGATIVE
MYCOBACTERIUM SPEC QL CULT: NEGATIVE
MYCOBACTERIUM SPEC QL CULT: NEGATIVE
SPECIMEN PREPARATION: NORMAL
SPECIMEN PREPARATION: NORMAL
SPECIMEN SOURCE: NORMAL
SPECIMEN SOURCE: NORMAL

## 2023-09-13 DIAGNOSIS — N92.6 IRREGULAR MENSTRUATION, UNSPECIFIED: ICD-10-CM

## 2023-09-14 RX ORDER — NORETHINDRONE AND ETHINYL ESTRADIOL 1 MG-35MCG
1 KIT ORAL DAILY
Qty: 28 TABLET | Refills: 1 | Status: SHIPPED | OUTPATIENT
Start: 2023-09-14

## 2023-09-18 ENCOUNTER — TELEPHONE (OUTPATIENT)
Age: 43
End: 2023-09-18

## 2023-09-18 NOTE — TELEPHONE ENCOUNTER
Spoke with pt and confirmed that she would need a repeat psych eval, endo clearance, pulm clearance, nutrition eval and subsequent visits. Pt expressed understanding of all that was dicussed.

## 2023-09-18 NOTE — TELEPHONE ENCOUNTER
----- Message from Uma Buenrostro sent at 9/14/2023  9:40 AM EDT -----  Regarding: requirments  Patient called and left me a message that she has questions about insurance requirements listed on her letter.   She can be reached at [phone]

## 2023-09-26 RX ORDER — MEDROXYPROGESTERONE ACETATE 10 MG/1
TABLET ORAL
Qty: 10 TABLET | Refills: 2 | OUTPATIENT
Start: 2023-09-26

## 2024-01-11 ENCOUNTER — HOSPITAL ENCOUNTER (OUTPATIENT)
Facility: HOSPITAL | Age: 44
Discharge: HOME | End: 2024-01-11
Payer: COMMERCIAL

## 2024-01-11 DIAGNOSIS — Z12.31 SCREENING MAMMOGRAM FOR HIGH-RISK PATIENT: ICD-10-CM

## 2024-01-11 PROCEDURE — 77063 BREAST TOMOSYNTHESIS BI: CPT

## 2024-05-07 ENCOUNTER — TELEPHONE (OUTPATIENT)
Age: 44
End: 2024-05-07

## 2024-05-07 NOTE — TELEPHONE ENCOUNTER
Attempted to call patient to confirm interest in SW[facility], LVM to return call.     Confirm interest in program -- pt will need  restart appointment with Mike.

## 2024-08-15 ENCOUNTER — OFFICE VISIT (OUTPATIENT)
Age: 44
End: 2024-08-15

## 2024-08-15 VITALS
RESPIRATION RATE: 16 BRPM | WEIGHT: 293 LBS | HEIGHT: 65 IN | SYSTOLIC BLOOD PRESSURE: 133 MMHG | HEART RATE: 84 BPM | TEMPERATURE: 98.1 F | OXYGEN SATURATION: 98 % | BODY MASS INDEX: 48.82 KG/M2 | DIASTOLIC BLOOD PRESSURE: 89 MMHG

## 2024-08-15 DIAGNOSIS — E66.01 CLASS 3 SEVERE OBESITY DUE TO EXCESS CALORIES WITH SERIOUS COMORBIDITY AND BODY MASS INDEX (BMI) OF 60.0 TO 69.9 IN ADULT (HCC): ICD-10-CM

## 2024-08-15 DIAGNOSIS — E11.65 TYPE 2 DIABETES MELLITUS WITH HYPERGLYCEMIA, WITH LONG-TERM CURRENT USE OF INSULIN (HCC): Primary | ICD-10-CM

## 2024-08-15 DIAGNOSIS — Z79.4 TYPE 2 DIABETES MELLITUS WITH HYPERGLYCEMIA, WITH LONG-TERM CURRENT USE OF INSULIN (HCC): Primary | ICD-10-CM

## 2024-08-15 LAB — GLUCOSE, POC: 197 MG/DL

## 2024-08-15 RX ORDER — SEMAGLUTIDE 1.34 MG/ML
INJECTION, SOLUTION SUBCUTANEOUS
Qty: 9 ML | Refills: 0 | Status: SHIPPED | OUTPATIENT
Start: 2024-08-15

## 2024-08-15 RX ORDER — DICLOFENAC SODIUM 75 MG/1
TABLET, DELAYED RELEASE ORAL
COMMUNITY
Start: 2024-08-12

## 2024-08-15 RX ORDER — PEN NEEDLE, DIABETIC 32GX 5/32"
NEEDLE, DISPOSABLE MISCELLANEOUS
COMMUNITY
Start: 2024-08-12

## 2024-08-15 RX ORDER — FLUCONAZOLE 150 MG/1
TABLET ORAL
COMMUNITY
Start: 2024-06-05

## 2024-08-15 RX ORDER — SEMAGLUTIDE 0.68 MG/ML
INJECTION, SOLUTION SUBCUTANEOUS
COMMUNITY
Start: 2024-08-08

## 2024-08-15 ASSESSMENT — PATIENT HEALTH QUESTIONNAIRE - PHQ9
SUM OF ALL RESPONSES TO PHQ QUESTIONS 1-9: 0
1. LITTLE INTEREST OR PLEASURE IN DOING THINGS: NOT AT ALL
SUM OF ALL RESPONSES TO PHQ QUESTIONS 1-9: 0
2. FEELING DOWN, DEPRESSED OR HOPELESS: NOT AT ALL
SUM OF ALL RESPONSES TO PHQ9 QUESTIONS 1 & 2: 0

## 2024-08-15 NOTE — PROGRESS NOTES
[facility]                                                                                    Lili Zamora M.D          Patient Information  Date:8/15/2024  Name : eNssa Rucker 43 y.o.     YOB: 1980         Referred by: Jake Renteria MD       Chief Complaint   Patient presents with    New Patient    Diabetes       History of Present Illness: Nessa Rucker is a 43 y.o. female with type 2 DM, Obesity, HTN who presented to [facility] care     Type 2 diabetes mellitus    Glucometer reading:  Results for orders placed or performed in visit on 08/15/24   AMB POC GLUCOSE BLOOD, BY GLUCOSE MONITORING DEVICE   Result Value Ref Range    Glucose,  MG/DL         · Diagnosis: 10 y  · Family history of diabetes Mellitus   · Current treatment: Lantus 50 units + novolog+ ozempic  0.5   · Past treatment:   · Glucose checks   · Hyperglycemia:   · Hypoglycemia:   · Meals per day: 3  ---Breakfast : diabetic shake,coffee, creamer  ----Lunch :gracy sausa crackers honey bar water  ----Dinner: 2 hot dogs  -----Snacks: pop corn   -----Drinks: juice  transportation  · Exercise: walking  Steroids:no  · DM related hospitalizations:   no  Smoking: no  Family history of coronary artery disease/stroke:  Mother= stroke 63  Complications of DM:  · CAD: no  · CVA: no  · PVD: no  · Amputations: no   · Retinopathy:5/24·   Gastropathy: no  · Nephropathy: no  · Neuropathy: no  Sees podiatrist:    Medications:  · Statin: atorvastatin   · ACE-I: no  · ASA: no     · Diabetes education:yes      Reports LMP was 3 months ago  No plans for further pregnancy      Past Medical History:   Diagnosis Date    Arthritis     back    Asthma     Chronic pain     back pains/ disc compression    Diabetes (HCC)     Hypertension     Morbid obesity (HCC)        Past Surgical History:   Procedure Laterality Date    BRONCHOSCOPY N/A 6/21/2023    BRONCHOSCOPY performed by Gene Diaz MD at [facility] 
1. \"Have you been to the ER, urgent care clinic since your last visit?  Hospitalized since your last visit?\" NO    2. \"Have you seen or consulted any other health care providers outside of the [facility] since your last visit?\" YES, PCP    3. For patients aged 45-75: Has the patient had a colonoscopy / FIT/ Cologuard? No      If the patient is female:    4. For patients aged 40-74: Has the patient had a mammogram within the past 2 years? YES      5. For patients aged 21-65: Has the patient had a pap smear? NO, will schedule      /89 (Site: Left Lower Arm, Position: Sitting, Cuff Size: Large Adult)   Pulse 84   Temp 98.1 °F (36.7 °C) (Temporal)   Resp 16   Ht 1.651 m (5' 5\")   Wt (!) 170.5 kg (375 lb 14.4 oz)   SpO2 98%   BMI 62.55 kg/m²             BS-197  
62.55 kg/m².  General: pleasant, no distress, good eye contact  HEENT: no pallor, no periorbital edema, EOMI  Neck: supple, no thyromegaly, no nodules  Cardiovascular: regular,  normal S1 and S2,   Respiratory: clear to auscultation bilaterally  Gastrointestinal: soft, nontender,   Musculoskeletal: no edema  Neurological: alert and oriented  Psychiatric: normal mood and affect    Data Reviewed:     No results found for: \"HBA1C\", \"GLU\", \"GESTF\", \"GLUCPOC\", \"MCA2\", \"LDL\", \"DANK\", \"CREAPOC\"   Lab Results   Component Value Date/Time    BUN 11 06/21/2023 07:42 AM     06/21/2023 07:42 AM    K 4.4 06/21/2023 07:42 AM     06/21/2023 07:42 AM    CO2 28 06/21/2023 07:42 AM    MG 2.2 02/12/2023 11:21 AM    PHOS 2.7 02/12/2023 11:21 AM       No results found for this visit on 08/15/24.      Assessment/Plan:     1. Type 2 diabetes mellitus with hyperglycemia, with long-term current use of insulin (HCC)        Orders Placed This Encounter   Procedures    AMB POC GLUCOSE BLOOD, BY GLUCOSE MONITORING DEVICE           I have discussed the diagnosis with the patient and the intended plan .  The patient has received an after-visit summary and questions were answered concerning future plans.  I have discussed medication side effects .    Thank you for allowing me to participate in the care of this patient.    Lili Zamora      There are no Patient Instructions on file for this visit.  No follow-up provider specified.          Patient /caregiver verbalized understanding .  Voice-recognition software was used to generate this report, which may result in some phonetic-based errors in the grammar and contents.  Even though attempts were made to correct all the mistakes, some may have been missed and remained in the body of the report.

## (undated) DEVICE — MASK 14X6.5MM O2 PVC ADLT ADPT -- 2 ENTRY PORT ELAS STRP NSE CL

## (undated) DEVICE — BOWL MED M 16OZ PLAS CAP GRAD

## (undated) DEVICE — FORCEPS BX L240CM JAW DIA2.8MM L CAP W/ NDL MIC MESH TOOTH

## (undated) DEVICE — SINGLE USE SUCTION VALVE MAJ-209: Brand: SINGLE USE SUCTION VALVE (STERILE)

## (undated) DEVICE — FCPS RAD JAW 4LC 240CM W/NDL -- BX/20 RADIAL JAW 4

## (undated) DEVICE — YANKAUER,BULB TIP,W/O VENT,RIGID,STERILE: Brand: MEDLINE

## (undated) DEVICE — THE ENDO CARRY-ON PROCEDURE KIT CONTAINS ALL OF THE SUPPLIES AND INFECTION PREVENTION PRODUCTS NEEDED FOR ENDOSCOPIC PROCEDURES: Brand: ENDO CARRY-ON PROCEDURE KIT

## (undated) DEVICE — SYRINGE MED 10CC ECC TIP W/O NDL

## (undated) DEVICE — SINGLE USE BIOPSY VALVE MAJ-210: Brand: SINGLE USE BIOPSY VALVE (STERILE)

## (undated) DEVICE — SYRINGE 20ML E/T: Brand: SYRINGE 20ML E/T